# Patient Record
Sex: FEMALE | Race: WHITE | Employment: UNEMPLOYED | URBAN - METROPOLITAN AREA
[De-identification: names, ages, dates, MRNs, and addresses within clinical notes are randomized per-mention and may not be internally consistent; named-entity substitution may affect disease eponyms.]

---

## 2017-01-10 ENCOUNTER — OFFICE VISIT (OUTPATIENT)
Dept: FAMILY MEDICINE CLINIC | Age: 77
End: 2017-01-10

## 2017-01-10 VITALS
TEMPERATURE: 97.4 F | DIASTOLIC BLOOD PRESSURE: 84 MMHG | HEART RATE: 56 BPM | WEIGHT: 189.2 LBS | BODY MASS INDEX: 31.48 KG/M2 | OXYGEN SATURATION: 93 % | SYSTOLIC BLOOD PRESSURE: 120 MMHG

## 2017-01-10 DIAGNOSIS — E11.9 TYPE 2 DIABETES MELLITUS WITHOUT COMPLICATION, WITHOUT LONG-TERM CURRENT USE OF INSULIN (HCC): ICD-10-CM

## 2017-01-10 DIAGNOSIS — G30.1 LATE ONSET ALZHEIMER'S DISEASE WITHOUT BEHAVIORAL DISTURBANCE (HCC): ICD-10-CM

## 2017-01-10 DIAGNOSIS — M19.90 ARTHRITIS: ICD-10-CM

## 2017-01-10 DIAGNOSIS — Z12.31 SCREENING MAMMOGRAM, ENCOUNTER FOR: ICD-10-CM

## 2017-01-10 DIAGNOSIS — I49.9 IRREGULAR HEART BEAT: Primary | ICD-10-CM

## 2017-01-10 DIAGNOSIS — E55.9 VITAMIN D DEFICIENCY: ICD-10-CM

## 2017-01-10 DIAGNOSIS — F02.80 LATE ONSET ALZHEIMER'S DISEASE WITHOUT BEHAVIORAL DISTURBANCE (HCC): ICD-10-CM

## 2017-01-10 DIAGNOSIS — J30.1 SEASONAL ALLERGIC RHINITIS DUE TO POLLEN: ICD-10-CM

## 2017-01-10 PROCEDURE — 99214 OFFICE O/P EST MOD 30 MIN: CPT | Performed by: FAMILY MEDICINE

## 2017-01-10 PROCEDURE — 36415 COLL VENOUS BLD VENIPUNCTURE: CPT | Performed by: FAMILY MEDICINE

## 2017-01-10 PROCEDURE — 93000 ELECTROCARDIOGRAM COMPLETE: CPT | Performed by: FAMILY MEDICINE

## 2017-01-10 RX ORDER — TRAMADOL HYDROCHLORIDE 50 MG/1
50 TABLET ORAL NIGHTLY
Qty: 30 TABLET | Refills: 3 | Status: SHIPPED | OUTPATIENT
Start: 2017-01-10 | End: 2017-02-15 | Stop reason: SDUPTHER

## 2017-01-10 RX ORDER — MEMANTINE HYDROCHLORIDE 5 MG/1
5 TABLET ORAL 2 TIMES DAILY
Qty: 60 TABLET | Refills: 3 | Status: SHIPPED | OUTPATIENT
Start: 2017-01-10 | End: 2017-03-09

## 2017-01-10 RX ORDER — MONTELUKAST SODIUM 10 MG/1
10 TABLET ORAL DAILY
Qty: 30 TABLET | Refills: 3 | Status: SHIPPED | OUTPATIENT
Start: 2017-01-10 | End: 2017-07-18

## 2017-01-11 LAB
ALT SERPL-CCNC: 53 U/L (ref 10–40)
ANION GAP SERPL CALCULATED.3IONS-SCNC: 18 MMOL/L (ref 3–16)
BUN BLDV-MCNC: 13 MG/DL (ref 7–20)
CALCIUM SERPL-MCNC: 9.5 MG/DL (ref 8.3–10.6)
CHLORIDE BLD-SCNC: 103 MMOL/L (ref 99–110)
CHOLESTEROL, TOTAL: 136 MG/DL (ref 0–199)
CO2: 21 MMOL/L (ref 21–32)
CREAT SERPL-MCNC: 0.6 MG/DL (ref 0.6–1.2)
ESTIMATED AVERAGE GLUCOSE: 162.8 MG/DL
GFR AFRICAN AMERICAN: >60
GFR NON-AFRICAN AMERICAN: >60
GLUCOSE BLD-MCNC: 153 MG/DL (ref 70–99)
HBA1C MFR BLD: 7.3 %
HDLC SERPL-MCNC: 47 MG/DL (ref 40–60)
LDL CHOLESTEROL CALCULATED: 51 MG/DL
POTASSIUM SERPL-SCNC: 4.2 MMOL/L (ref 3.5–5.1)
SODIUM BLD-SCNC: 142 MMOL/L (ref 136–145)
TRIGL SERPL-MCNC: 191 MG/DL (ref 0–150)
TSH REFLEX: 1.15 UIU/ML (ref 0.27–4.2)
VITAMIN D 25-HYDROXY: 47.1 NG/ML
VLDLC SERPL CALC-MCNC: 38 MG/DL

## 2017-01-16 RX ORDER — RIZATRIPTAN BENZOATE 10 MG/1
TABLET ORAL
Qty: 9 TABLET | Refills: 2 | Status: SHIPPED | OUTPATIENT
Start: 2017-01-16 | End: 2017-06-05 | Stop reason: SDUPTHER

## 2017-01-23 ENCOUNTER — TELEPHONE (OUTPATIENT)
Dept: FAMILY MEDICINE CLINIC | Age: 77
End: 2017-01-23

## 2017-02-15 ENCOUNTER — OFFICE VISIT (OUTPATIENT)
Dept: FAMILY MEDICINE CLINIC | Age: 77
End: 2017-02-15

## 2017-02-15 VITALS
HEART RATE: 67 BPM | SYSTOLIC BLOOD PRESSURE: 110 MMHG | OXYGEN SATURATION: 95 % | WEIGHT: 187.6 LBS | DIASTOLIC BLOOD PRESSURE: 56 MMHG | TEMPERATURE: 97.5 F | BODY MASS INDEX: 31.22 KG/M2

## 2017-02-15 DIAGNOSIS — G43.709 CHRONIC MIGRAINE WITHOUT AURA WITHOUT STATUS MIGRAINOSUS, NOT INTRACTABLE: Primary | ICD-10-CM

## 2017-02-15 DIAGNOSIS — M19.90 ARTHRITIS: ICD-10-CM

## 2017-02-15 DIAGNOSIS — J30.1 SEASONAL ALLERGIC RHINITIS DUE TO POLLEN: ICD-10-CM

## 2017-02-15 DIAGNOSIS — J01.00 ACUTE NON-RECURRENT MAXILLARY SINUSITIS: ICD-10-CM

## 2017-02-15 PROCEDURE — G8399 PT W/DXA RESULTS DOCUMENT: HCPCS | Performed by: FAMILY MEDICINE

## 2017-02-15 PROCEDURE — 1090F PRES/ABSN URINE INCON ASSESS: CPT | Performed by: FAMILY MEDICINE

## 2017-02-15 PROCEDURE — 4040F PNEUMOC VAC/ADMIN/RCVD: CPT | Performed by: FAMILY MEDICINE

## 2017-02-15 PROCEDURE — 1123F ACP DISCUSS/DSCN MKR DOCD: CPT | Performed by: FAMILY MEDICINE

## 2017-02-15 PROCEDURE — 1036F TOBACCO NON-USER: CPT | Performed by: FAMILY MEDICINE

## 2017-02-15 PROCEDURE — G8417 CALC BMI ABV UP PARAM F/U: HCPCS | Performed by: FAMILY MEDICINE

## 2017-02-15 PROCEDURE — G8484 FLU IMMUNIZE NO ADMIN: HCPCS | Performed by: FAMILY MEDICINE

## 2017-02-15 PROCEDURE — G8428 CUR MEDS NOT DOCUMENT: HCPCS | Performed by: FAMILY MEDICINE

## 2017-02-15 PROCEDURE — G8599 NO ASA/ANTIPLAT THER USE RNG: HCPCS | Performed by: FAMILY MEDICINE

## 2017-02-15 PROCEDURE — 99214 OFFICE O/P EST MOD 30 MIN: CPT | Performed by: FAMILY MEDICINE

## 2017-02-15 RX ORDER — TRAMADOL HYDROCHLORIDE 50 MG/1
100 TABLET ORAL NIGHTLY
Qty: 60 TABLET | Refills: 3 | Status: SHIPPED | OUTPATIENT
Start: 2017-02-15 | End: 2017-03-09

## 2017-02-15 RX ORDER — DOXYCYCLINE 100 MG/1
100 CAPSULE ORAL 2 TIMES DAILY
Qty: 20 CAPSULE | Refills: 0 | Status: SHIPPED | OUTPATIENT
Start: 2017-02-15 | End: 2017-03-09

## 2017-02-15 RX ORDER — TOPIRAMATE 25 MG/1
TABLET ORAL
Qty: 120 TABLET | Refills: 3 | Status: SHIPPED | OUTPATIENT
Start: 2017-02-15 | End: 2017-07-15 | Stop reason: SDUPTHER

## 2017-02-15 RX ORDER — LEVOCETIRIZINE DIHYDROCHLORIDE 5 MG/1
5 TABLET, FILM COATED ORAL NIGHTLY
Qty: 30 TABLET | Refills: 5 | Status: ON HOLD | OUTPATIENT
Start: 2017-02-15 | End: 2017-09-07 | Stop reason: HOSPADM

## 2017-03-09 ENCOUNTER — OFFICE VISIT (OUTPATIENT)
Dept: FAMILY MEDICINE CLINIC | Age: 77
End: 2017-03-09

## 2017-03-09 VITALS
HEART RATE: 75 BPM | DIASTOLIC BLOOD PRESSURE: 60 MMHG | BODY MASS INDEX: 29.42 KG/M2 | TEMPERATURE: 97.4 F | OXYGEN SATURATION: 96 % | WEIGHT: 176.8 LBS | SYSTOLIC BLOOD PRESSURE: 100 MMHG

## 2017-03-09 DIAGNOSIS — E11.9 TYPE 2 DIABETES MELLITUS WITHOUT COMPLICATION, WITHOUT LONG-TERM CURRENT USE OF INSULIN (HCC): ICD-10-CM

## 2017-03-09 DIAGNOSIS — R53.83 FATIGUE, UNSPECIFIED TYPE: ICD-10-CM

## 2017-03-09 DIAGNOSIS — R10.84 GENERALIZED ABDOMINAL PAIN: Primary | ICD-10-CM

## 2017-03-09 DIAGNOSIS — G30.1 LATE ONSET ALZHEIMER'S DISEASE WITHOUT BEHAVIORAL DISTURBANCE (HCC): ICD-10-CM

## 2017-03-09 DIAGNOSIS — F02.80 LATE ONSET ALZHEIMER'S DISEASE WITHOUT BEHAVIORAL DISTURBANCE (HCC): ICD-10-CM

## 2017-03-09 DIAGNOSIS — R06.02 SHORTNESS OF BREATH: ICD-10-CM

## 2017-03-09 DIAGNOSIS — I10 ESSENTIAL HYPERTENSION: ICD-10-CM

## 2017-03-09 LAB
ALT SERPL-CCNC: 60 U/L (ref 10–40)
ANION GAP SERPL CALCULATED.3IONS-SCNC: 19 MMOL/L (ref 3–16)
AST SERPL-CCNC: 46 U/L (ref 15–37)
BASOPHILS ABSOLUTE: 0 K/UL (ref 0–0.2)
BASOPHILS RELATIVE PERCENT: 0.6 %
BUN BLDV-MCNC: 17 MG/DL (ref 7–20)
CALCIUM SERPL-MCNC: 9.9 MG/DL (ref 8.3–10.6)
CHLORIDE BLD-SCNC: 102 MMOL/L (ref 99–110)
CHOLESTEROL, TOTAL: 113 MG/DL (ref 0–199)
CO2: 21 MMOL/L (ref 21–32)
CREAT SERPL-MCNC: 0.8 MG/DL (ref 0.6–1.2)
EOSINOPHILS ABSOLUTE: 0.2 K/UL (ref 0–0.6)
EOSINOPHILS RELATIVE PERCENT: 3 %
GFR AFRICAN AMERICAN: >60
GFR NON-AFRICAN AMERICAN: >60
GLUCOSE BLD-MCNC: 96 MG/DL (ref 70–99)
HCT VFR BLD CALC: 45.4 % (ref 36–48)
HDLC SERPL-MCNC: 47 MG/DL (ref 40–60)
HEMOGLOBIN: 15.1 G/DL (ref 12–16)
LDL CHOLESTEROL CALCULATED: 37 MG/DL
LYMPHOCYTES ABSOLUTE: 2.3 K/UL (ref 1–5.1)
LYMPHOCYTES RELATIVE PERCENT: 36.8 %
MCH RBC QN AUTO: 30.3 PG (ref 26–34)
MCHC RBC AUTO-ENTMCNC: 33.2 G/DL (ref 31–36)
MCV RBC AUTO: 91.2 FL (ref 80–100)
MONOCYTES ABSOLUTE: 0.5 K/UL (ref 0–1.3)
MONOCYTES RELATIVE PERCENT: 7.1 %
NEUTROPHILS ABSOLUTE: 3.3 K/UL (ref 1.7–7.7)
NEUTROPHILS RELATIVE PERCENT: 52.5 %
PDW BLD-RTO: 14.8 % (ref 12.4–15.4)
PLATELET # BLD: 198 K/UL (ref 135–450)
PMV BLD AUTO: 9.2 FL (ref 5–10.5)
POTASSIUM SERPL-SCNC: 4 MMOL/L (ref 3.5–5.1)
RBC # BLD: 4.98 M/UL (ref 4–5.2)
SODIUM BLD-SCNC: 142 MMOL/L (ref 136–145)
TRIGL SERPL-MCNC: 146 MG/DL (ref 0–150)
TSH REFLEX: 0.73 UIU/ML (ref 0.27–4.2)
VLDLC SERPL CALC-MCNC: 29 MG/DL
WBC # BLD: 6.4 K/UL (ref 4–11)

## 2017-03-09 PROCEDURE — G8399 PT W/DXA RESULTS DOCUMENT: HCPCS | Performed by: FAMILY MEDICINE

## 2017-03-09 PROCEDURE — 1036F TOBACCO NON-USER: CPT | Performed by: FAMILY MEDICINE

## 2017-03-09 PROCEDURE — G8599 NO ASA/ANTIPLAT THER USE RNG: HCPCS | Performed by: FAMILY MEDICINE

## 2017-03-09 PROCEDURE — 1123F ACP DISCUSS/DSCN MKR DOCD: CPT | Performed by: FAMILY MEDICINE

## 2017-03-09 PROCEDURE — 36415 COLL VENOUS BLD VENIPUNCTURE: CPT | Performed by: FAMILY MEDICINE

## 2017-03-09 PROCEDURE — 99214 OFFICE O/P EST MOD 30 MIN: CPT | Performed by: FAMILY MEDICINE

## 2017-03-09 PROCEDURE — G8427 DOCREV CUR MEDS BY ELIG CLIN: HCPCS | Performed by: FAMILY MEDICINE

## 2017-03-09 PROCEDURE — 1090F PRES/ABSN URINE INCON ASSESS: CPT | Performed by: FAMILY MEDICINE

## 2017-03-09 PROCEDURE — G8420 CALC BMI NORM PARAMETERS: HCPCS | Performed by: FAMILY MEDICINE

## 2017-03-09 PROCEDURE — G8484 FLU IMMUNIZE NO ADMIN: HCPCS | Performed by: FAMILY MEDICINE

## 2017-03-09 PROCEDURE — 4040F PNEUMOC VAC/ADMIN/RCVD: CPT | Performed by: FAMILY MEDICINE

## 2017-03-09 RX ORDER — LISINOPRIL 20 MG/1
10 TABLET ORAL DAILY
Qty: 30 TABLET | Refills: 5 | Status: ON HOLD
Start: 2017-03-09 | End: 2017-04-20 | Stop reason: HOSPADM

## 2017-03-10 ENCOUNTER — HOSPITAL ENCOUNTER (OUTPATIENT)
Dept: OTHER | Age: 77
Discharge: HOME | End: 2017-03-10
Attending: FAMILY MEDICINE | Admitting: FAMILY MEDICINE

## 2017-03-10 DIAGNOSIS — R79.89 ELEVATED LFTS: Primary | ICD-10-CM

## 2017-03-10 DIAGNOSIS — R74.8 ELEVATED LIVER ENZYMES: Primary | ICD-10-CM

## 2017-03-10 DIAGNOSIS — R10.84 GENERALIZED ABDOMINAL PAIN: ICD-10-CM

## 2017-03-10 DIAGNOSIS — R06.02 SHORTNESS OF BREATH: ICD-10-CM

## 2017-03-10 LAB
ESTIMATED AVERAGE GLUCOSE: 148.5 MG/DL
HBA1C MFR BLD: 6.8 %

## 2017-03-21 ENCOUNTER — HOSPITAL ENCOUNTER (OUTPATIENT)
Dept: ULTRASOUND IMAGING | Age: 77
Discharge: HOME | End: 2017-03-21
Attending: FAMILY MEDICINE | Admitting: FAMILY MEDICINE

## 2017-03-21 DIAGNOSIS — R79.89 OTHER SPECIFIED ABNORMAL FINDINGS OF BLOOD CHEMISTRY: ICD-10-CM

## 2017-03-21 DIAGNOSIS — R79.89 ELEVATED LFTS: ICD-10-CM

## 2017-03-21 PROBLEM — K75.81 NASH (NONALCOHOLIC STEATOHEPATITIS): Status: ACTIVE | Noted: 2017-03-21

## 2017-03-21 PROBLEM — K76.0 HEPATIC STEATOSIS: Status: RESOLVED | Noted: 2017-03-21 | Resolved: 2017-03-21

## 2017-03-21 PROBLEM — K76.0 HEPATIC STEATOSIS: Status: ACTIVE | Noted: 2017-03-21

## 2017-03-21 LAB
ALBUMIN SERPL-MCNC: 3.9 G/DL (ref 3.4–5)
ALP BLD-CCNC: 83 U/L (ref 40–129)
ALT SERPL-CCNC: 66 U/L (ref 10–40)
AST SERPL-CCNC: 53 U/L (ref 15–37)
BILIRUB SERPL-MCNC: 0.6 MG/DL (ref 0–1)
BILIRUBIN DIRECT: <0.2 MG/DL (ref 0–0.3)
BILIRUBIN, INDIRECT: ABNORMAL MG/DL (ref 0–1)
HEPATITIS B CORE IGM ANTIBODY: NORMAL
HEPATITIS B SURFACE ANTIGEN INTERPRETATION: NORMAL
IRON SATURATION: 32 % (ref 15–50)
IRON: 84 UG/DL (ref 37–145)
TOTAL CK: 38 U/L (ref 26–192)
TOTAL IRON BINDING CAPACITY: 262 UG/DL (ref 260–445)

## 2017-03-22 LAB
ANA INTERPRETATION: NORMAL
ANTI-NUCLEAR ANTIBODY (ANA): NEGATIVE
HEPATITIS C ANTIBODY INTERPRETATION: NORMAL
IGA: 150 MG/DL (ref 70–400)
IGG: 710 MG/DL (ref 700–1600)
IGM: 214 MG/DL (ref 40–230)
SPE/IFE INTERPRETATION: NORMAL

## 2017-03-23 LAB
ALBUMIN SERPL-MCNC: 3.4 G/DL (ref 3.1–4.9)
ALPHA-1-GLOBULIN: 0.3 G/DL (ref 0.2–0.4)
ALPHA-2-GLOBULIN: 1 G/DL (ref 0.4–1.1)
BETA GLOBULIN: 0.9 G/DL (ref 0.9–1.6)
GAMMA GLOBULIN: 0.9 G/DL (ref 0.6–1.8)
HEPATITIS B CORE TOTAL ANTIBODY: NEGATIVE
M SPIKE 1 SERUM PROTEIN ELEC: 0.3 G/DL
TOTAL PROTEIN: 6.6 G/DL (ref 6.4–8.2)

## 2017-03-30 DIAGNOSIS — R74.8 ELEVATED LIVER ENZYMES: Primary | ICD-10-CM

## 2017-04-04 ENCOUNTER — TELEPHONE (OUTPATIENT)
Dept: FAMILY MEDICINE CLINIC | Age: 77
End: 2017-04-04

## 2017-04-05 ENCOUNTER — HOSPITAL ENCOUNTER (OUTPATIENT)
Dept: OTHER | Age: 77
Discharge: HOME | End: 2017-04-05
Attending: INTERNAL MEDICINE | Admitting: INTERNAL MEDICINE

## 2017-04-06 LAB — AFP-TUMOR MARKER: 2 NG/ML (ref 0–9)

## 2017-04-11 ENCOUNTER — HOSPITAL ENCOUNTER (OUTPATIENT)
Dept: CT IMAGING | Facility: MEDICAL CENTER | Age: 77
Discharge: HOME | End: 2017-04-11
Attending: INTERNAL MEDICINE | Admitting: INTERNAL MEDICINE

## 2017-04-11 DIAGNOSIS — R63.4 ABNORMAL WEIGHT LOSS: ICD-10-CM

## 2017-04-11 DIAGNOSIS — R63.4 LOSS OF WEIGHT: ICD-10-CM

## 2017-04-19 ENCOUNTER — HOSPITAL ENCOUNTER (OUTPATIENT)
Dept: OTHER | Age: 77
Discharge: HOME | End: 2017-04-19
Attending: INTERNAL MEDICINE | Admitting: INTERNAL MEDICINE

## 2017-04-19 VITALS
SYSTOLIC BLOOD PRESSURE: 78 MMHG | DIASTOLIC BLOOD PRESSURE: 52 MMHG | HEART RATE: 65 BPM | TEMPERATURE: 97.4 F | RESPIRATION RATE: 16 BRPM | OXYGEN SATURATION: 98 % | HEIGHT: 68 IN

## 2017-04-19 PROBLEM — E16.2 HYPOGLYCEMIA: Status: ACTIVE | Noted: 2017-04-19

## 2017-04-19 PROBLEM — K86.89 PANCREATIC MASS: Status: ACTIVE | Noted: 2017-04-19

## 2017-04-19 PROBLEM — I95.9 HYPOTENSION: Status: ACTIVE | Noted: 2017-04-19

## 2017-04-19 PROBLEM — F03.90 DEMENTIA (HCC): Status: ACTIVE | Noted: 2017-04-19

## 2017-04-19 RX ORDER — SODIUM CHLORIDE, SODIUM LACTATE, POTASSIUM CHLORIDE, CALCIUM CHLORIDE 600; 310; 30; 20 MG/100ML; MG/100ML; MG/100ML; MG/100ML
INJECTION, SOLUTION INTRAVENOUS CONTINUOUS
Status: DISCONTINUED | OUTPATIENT
Start: 2017-04-19 | End: 2017-04-20 | Stop reason: HOSPADM

## 2017-04-19 RX ORDER — SODIUM CHLORIDE, SODIUM LACTATE, POTASSIUM CHLORIDE, CALCIUM CHLORIDE 600; 310; 30; 20 MG/100ML; MG/100ML; MG/100ML; MG/100ML
INJECTION, SOLUTION INTRAVENOUS CONTINUOUS
Status: DISCONTINUED | OUTPATIENT
Start: 2017-04-19 | End: 2017-04-19 | Stop reason: DRUGHIGH

## 2017-04-19 RX ORDER — LIDOCAINE HYDROCHLORIDE 10 MG/ML
0.1 INJECTION, SOLUTION INFILTRATION; PERINEURAL
Status: ACTIVE | OUTPATIENT
Start: 2017-04-19 | End: 2017-04-19

## 2017-04-19 RX ADMIN — SODIUM CHLORIDE, SODIUM LACTATE, POTASSIUM CHLORIDE, CALCIUM CHLORIDE: 600; 310; 30; 20 INJECTION, SOLUTION INTRAVENOUS at 11:07

## 2017-04-19 ASSESSMENT — PAIN - FUNCTIONAL ASSESSMENT: PAIN_FUNCTIONAL_ASSESSMENT: 0-10

## 2017-04-20 ENCOUNTER — EPISODE CHANGES (OUTPATIENT)
Dept: CASE MANAGEMENT | Age: 77
End: 2017-04-20

## 2017-04-21 ENCOUNTER — CARE COORDINATION (OUTPATIENT)
Dept: CASE MANAGEMENT | Age: 77
End: 2017-04-21

## 2017-04-23 ENCOUNTER — CARE COORDINATION (OUTPATIENT)
Dept: CASE MANAGEMENT | Age: 77
End: 2017-04-23

## 2017-04-24 ENCOUNTER — TELEPHONE (OUTPATIENT)
Dept: FAMILY MEDICINE CLINIC | Age: 77
End: 2017-04-24

## 2017-04-24 DIAGNOSIS — R79.9 ABNORMAL BLOOD CHEMISTRY TEST: Primary | ICD-10-CM

## 2017-04-26 ENCOUNTER — TELEPHONE (OUTPATIENT)
Dept: FAMILY MEDICINE CLINIC | Age: 77
End: 2017-04-26

## 2017-04-26 ENCOUNTER — OFFICE VISIT (OUTPATIENT)
Dept: FAMILY MEDICINE CLINIC | Age: 77
End: 2017-04-26

## 2017-04-26 VITALS
SYSTOLIC BLOOD PRESSURE: 82 MMHG | HEART RATE: 59 BPM | DIASTOLIC BLOOD PRESSURE: 64 MMHG | BODY MASS INDEX: 25.42 KG/M2 | TEMPERATURE: 97.9 F | OXYGEN SATURATION: 95 % | WEIGHT: 167.2 LBS

## 2017-04-26 DIAGNOSIS — I95.9 HYPOTENSION, UNSPECIFIED HYPOTENSION TYPE: Primary | ICD-10-CM

## 2017-04-26 DIAGNOSIS — M51.9 LUMBAR DISC DISEASE: ICD-10-CM

## 2017-04-26 DIAGNOSIS — F02.80 LATE ONSET ALZHEIMER'S DISEASE WITHOUT BEHAVIORAL DISTURBANCE (HCC): ICD-10-CM

## 2017-04-26 DIAGNOSIS — G30.1 LATE ONSET ALZHEIMER'S DISEASE WITHOUT BEHAVIORAL DISTURBANCE (HCC): ICD-10-CM

## 2017-04-26 DIAGNOSIS — K59.00 CONSTIPATION, UNSPECIFIED CONSTIPATION TYPE: ICD-10-CM

## 2017-04-26 DIAGNOSIS — E16.2 HYPOGLYCEMIA: ICD-10-CM

## 2017-04-26 DIAGNOSIS — K86.89 PANCREATIC MASS: ICD-10-CM

## 2017-04-26 PROCEDURE — G8598 ASA/ANTIPLAT THER USED: HCPCS | Performed by: FAMILY MEDICINE

## 2017-04-26 PROCEDURE — G8420 CALC BMI NORM PARAMETERS: HCPCS | Performed by: FAMILY MEDICINE

## 2017-04-26 PROCEDURE — 1111F DSCHRG MED/CURRENT MED MERGE: CPT | Performed by: FAMILY MEDICINE

## 2017-04-26 PROCEDURE — 4040F PNEUMOC VAC/ADMIN/RCVD: CPT | Performed by: FAMILY MEDICINE

## 2017-04-26 PROCEDURE — 99214 OFFICE O/P EST MOD 30 MIN: CPT | Performed by: FAMILY MEDICINE

## 2017-04-26 PROCEDURE — 1036F TOBACCO NON-USER: CPT | Performed by: FAMILY MEDICINE

## 2017-04-26 PROCEDURE — 1123F ACP DISCUSS/DSCN MKR DOCD: CPT | Performed by: FAMILY MEDICINE

## 2017-04-26 PROCEDURE — G8399 PT W/DXA RESULTS DOCUMENT: HCPCS | Performed by: FAMILY MEDICINE

## 2017-04-26 PROCEDURE — G8427 DOCREV CUR MEDS BY ELIG CLIN: HCPCS | Performed by: FAMILY MEDICINE

## 2017-04-26 PROCEDURE — 1090F PRES/ABSN URINE INCON ASSESS: CPT | Performed by: FAMILY MEDICINE

## 2017-04-26 RX ORDER — GABAPENTIN 100 MG/1
100 CAPSULE ORAL 3 TIMES DAILY
Qty: 90 CAPSULE | Refills: 3 | Status: SHIPPED | OUTPATIENT
Start: 2017-04-26 | End: 2017-09-26 | Stop reason: SINTOL

## 2017-04-28 ENCOUNTER — HOSPITAL ENCOUNTER (OUTPATIENT)
Dept: OTHER | Age: 77
Discharge: HOME | End: 2017-04-28
Attending: FAMILY MEDICINE | Admitting: FAMILY MEDICINE

## 2017-04-28 LAB
ALBUMIN SERPL-MCNC: 3.3 G/DL (ref 3.4–5)
ANION GAP SERPL CALCULATED.3IONS-SCNC: 14 MMOL/L (ref 3–16)
BUN BLDV-MCNC: 12 MG/DL (ref 7–20)
CALCIUM SERPL-MCNC: 9.4 MG/DL (ref 8.3–10.6)
CHLORIDE BLD-SCNC: 109 MMOL/L (ref 99–110)
CO2: 22 MMOL/L (ref 21–32)
CREAT SERPL-MCNC: 0.6 MG/DL (ref 0.6–1.2)
GFR AFRICAN AMERICAN: >60
GFR NON-AFRICAN AMERICAN: >60
GLUCOSE BLD-MCNC: 147 MG/DL (ref 70–99)
PHOSPHORUS: 3.2 MG/DL (ref 2.5–4.9)
POTASSIUM SERPL-SCNC: 3.9 MMOL/L (ref 3.5–5.1)
SODIUM BLD-SCNC: 145 MMOL/L (ref 136–145)

## 2017-05-05 RX ORDER — DONEPEZIL HYDROCHLORIDE 10 MG/1
TABLET, FILM COATED ORAL
Qty: 30 TABLET | Refills: 5 | Status: SHIPPED | OUTPATIENT
Start: 2017-05-05 | End: 2017-12-02 | Stop reason: SDUPTHER

## 2017-05-09 ENCOUNTER — TELEPHONE (OUTPATIENT)
Dept: FAMILY MEDICINE CLINIC | Age: 77
End: 2017-05-09

## 2017-05-10 ENCOUNTER — TELEPHONE (OUTPATIENT)
Dept: FAMILY MEDICINE CLINIC | Age: 77
End: 2017-05-10

## 2017-05-10 ENCOUNTER — OFFICE VISIT (OUTPATIENT)
Dept: FAMILY MEDICINE CLINIC | Age: 77
End: 2017-05-10

## 2017-05-10 VITALS
WEIGHT: 164.2 LBS | BODY MASS INDEX: 24.97 KG/M2 | TEMPERATURE: 97.4 F | DIASTOLIC BLOOD PRESSURE: 52 MMHG | HEART RATE: 52 BPM | SYSTOLIC BLOOD PRESSURE: 100 MMHG | OXYGEN SATURATION: 97 %

## 2017-05-10 DIAGNOSIS — E55.9 VITAMIN D DEFICIENCY: ICD-10-CM

## 2017-05-10 DIAGNOSIS — F02.80 LATE ONSET ALZHEIMER'S DISEASE WITHOUT BEHAVIORAL DISTURBANCE (HCC): ICD-10-CM

## 2017-05-10 DIAGNOSIS — M51.9 LUMBAR DISC DISEASE: ICD-10-CM

## 2017-05-10 DIAGNOSIS — G30.1 LATE ONSET ALZHEIMER'S DISEASE WITHOUT BEHAVIORAL DISTURBANCE (HCC): ICD-10-CM

## 2017-05-10 DIAGNOSIS — E78.00 HYPERCHOLESTEREMIA: Primary | ICD-10-CM

## 2017-05-10 DIAGNOSIS — I10 ESSENTIAL HYPERTENSION: ICD-10-CM

## 2017-05-10 DIAGNOSIS — B35.1 ONYCHOMYCOSIS: ICD-10-CM

## 2017-05-10 DIAGNOSIS — K58.2 IRRITABLE BOWEL SYNDROME WITH BOTH CONSTIPATION AND DIARRHEA: ICD-10-CM

## 2017-05-10 DIAGNOSIS — E53.8 B12 DEFICIENCY: ICD-10-CM

## 2017-05-10 DIAGNOSIS — E16.2 HYPOGLYCEMIA: ICD-10-CM

## 2017-05-10 PROCEDURE — 4040F PNEUMOC VAC/ADMIN/RCVD: CPT | Performed by: FAMILY MEDICINE

## 2017-05-10 PROCEDURE — 1090F PRES/ABSN URINE INCON ASSESS: CPT | Performed by: FAMILY MEDICINE

## 2017-05-10 PROCEDURE — 1036F TOBACCO NON-USER: CPT | Performed by: FAMILY MEDICINE

## 2017-05-10 PROCEDURE — G8420 CALC BMI NORM PARAMETERS: HCPCS | Performed by: FAMILY MEDICINE

## 2017-05-10 PROCEDURE — G8399 PT W/DXA RESULTS DOCUMENT: HCPCS | Performed by: FAMILY MEDICINE

## 2017-05-10 PROCEDURE — 1111F DSCHRG MED/CURRENT MED MERGE: CPT | Performed by: FAMILY MEDICINE

## 2017-05-10 PROCEDURE — 1123F ACP DISCUSS/DSCN MKR DOCD: CPT | Performed by: FAMILY MEDICINE

## 2017-05-10 PROCEDURE — 36415 COLL VENOUS BLD VENIPUNCTURE: CPT | Performed by: FAMILY MEDICINE

## 2017-05-10 PROCEDURE — G8427 DOCREV CUR MEDS BY ELIG CLIN: HCPCS | Performed by: FAMILY MEDICINE

## 2017-05-10 PROCEDURE — 99214 OFFICE O/P EST MOD 30 MIN: CPT | Performed by: FAMILY MEDICINE

## 2017-05-10 PROCEDURE — G8598 ASA/ANTIPLAT THER USED: HCPCS | Performed by: FAMILY MEDICINE

## 2017-05-10 RX ORDER — DICYCLOMINE HYDROCHLORIDE 10 MG/1
10 CAPSULE ORAL 3 TIMES DAILY PRN
Qty: 120 CAPSULE | Refills: 3 | Status: SHIPPED | OUTPATIENT
Start: 2017-05-10 | End: 2017-12-02 | Stop reason: SDUPTHER

## 2017-05-10 RX ORDER — TERBINAFINE HYDROCHLORIDE 250 MG/1
250 TABLET ORAL DAILY
Qty: 28 TABLET | Refills: 2 | Status: ON HOLD | OUTPATIENT
Start: 2017-05-10 | End: 2017-09-07 | Stop reason: HOSPADM

## 2017-05-11 LAB
ALT SERPL-CCNC: 42 U/L (ref 10–40)
ANION GAP SERPL CALCULATED.3IONS-SCNC: 14 MMOL/L (ref 3–16)
BUN BLDV-MCNC: 15 MG/DL (ref 7–20)
CALCIUM SERPL-MCNC: 9.3 MG/DL (ref 8.3–10.6)
CHLORIDE BLD-SCNC: 109 MMOL/L (ref 99–110)
CHOLESTEROL, TOTAL: 119 MG/DL (ref 0–199)
CO2: 23 MMOL/L (ref 21–32)
CREAT SERPL-MCNC: 0.6 MG/DL (ref 0.6–1.2)
ESTIMATED AVERAGE GLUCOSE: 125.5 MG/DL
GFR AFRICAN AMERICAN: >60
GFR NON-AFRICAN AMERICAN: >60
GLUCOSE BLD-MCNC: 126 MG/DL (ref 70–99)
HBA1C MFR BLD: 6 %
HDLC SERPL-MCNC: 46 MG/DL (ref 40–60)
LDL CHOLESTEROL CALCULATED: 34 MG/DL
POTASSIUM SERPL-SCNC: 4.3 MMOL/L (ref 3.5–5.1)
SODIUM BLD-SCNC: 146 MMOL/L (ref 136–145)
TRIGL SERPL-MCNC: 196 MG/DL (ref 0–150)
TSH REFLEX: 0.85 UIU/ML (ref 0.27–4.2)
VITAMIN B-12: 716 PG/ML (ref 211–911)
VITAMIN D 25-HYDROXY: 48.9 NG/ML
VLDLC SERPL CALC-MCNC: 39 MG/DL

## 2017-05-23 ENCOUNTER — HOSPITAL ENCOUNTER (OUTPATIENT)
Dept: MAMMOGRAPHY | Age: 77
Discharge: HOME | End: 2017-05-23
Attending: FAMILY MEDICINE | Admitting: FAMILY MEDICINE

## 2017-05-23 DIAGNOSIS — Z12.31 ENCOUNTER FOR SCREENING MAMMOGRAM FOR BREAST CANCER: ICD-10-CM

## 2017-06-06 RX ORDER — RIZATRIPTAN BENZOATE 10 MG/1
TABLET ORAL
Qty: 9 TABLET | Refills: 2 | Status: SHIPPED | OUTPATIENT
Start: 2017-06-06 | End: 2017-12-02 | Stop reason: SDUPTHER

## 2017-06-08 ENCOUNTER — TELEPHONE (OUTPATIENT)
Dept: FAMILY MEDICINE CLINIC | Age: 77
End: 2017-06-08

## 2017-06-08 DIAGNOSIS — G30.1 LATE ONSET ALZHEIMER'S DISEASE WITHOUT BEHAVIORAL DISTURBANCE (HCC): Primary | ICD-10-CM

## 2017-06-08 DIAGNOSIS — M51.9 LUMBAR DISC DISEASE: ICD-10-CM

## 2017-06-08 DIAGNOSIS — F02.80 LATE ONSET ALZHEIMER'S DISEASE WITHOUT BEHAVIORAL DISTURBANCE (HCC): Primary | ICD-10-CM

## 2017-06-08 DIAGNOSIS — R53.1 WEAKNESS: ICD-10-CM

## 2017-06-09 RX ORDER — LACTOSE-REDUCED FOOD/FIBER
LIQUID (ML) ORAL
Qty: 60 CAN | Refills: 5 | Status: SHIPPED | OUTPATIENT
Start: 2017-06-09 | End: 2017-06-23 | Stop reason: SDUPTHER

## 2017-06-13 ENCOUNTER — TELEPHONE (OUTPATIENT)
Dept: FAMILY MEDICINE CLINIC | Age: 77
End: 2017-06-13

## 2017-06-13 RX ORDER — MEMANTINE HYDROCHLORIDE 5 MG/1
5 TABLET ORAL 2 TIMES DAILY
Qty: 60 TABLET | Refills: 3 | Status: SHIPPED | OUTPATIENT
Start: 2017-06-13 | End: 2018-03-03 | Stop reason: SDUPTHER

## 2017-06-15 ENCOUNTER — CARE COORDINATION (OUTPATIENT)
Dept: CARE COORDINATION | Age: 77
End: 2017-06-15

## 2017-06-23 ENCOUNTER — CARE COORDINATION (OUTPATIENT)
Dept: CARE COORDINATION | Age: 77
End: 2017-06-23

## 2017-06-23 RX ORDER — LACTOSE-REDUCED FOOD/FIBER
LIQUID (ML) ORAL
Qty: 60 CAN | Refills: 5 | Status: ON HOLD | OUTPATIENT
Start: 2017-06-23 | End: 2017-09-07 | Stop reason: HOSPADM

## 2017-07-14 ENCOUNTER — CARE COORDINATION (OUTPATIENT)
Dept: CARE COORDINATION | Age: 77
End: 2017-07-14

## 2017-07-17 ENCOUNTER — CARE COORDINATION (OUTPATIENT)
Dept: CARE COORDINATION | Age: 77
End: 2017-07-17

## 2017-07-17 RX ORDER — TOPIRAMATE 25 MG/1
TABLET ORAL
Qty: 120 TABLET | Refills: 5 | Status: SHIPPED | OUTPATIENT
Start: 2017-07-17 | End: 2018-02-04 | Stop reason: SDUPTHER

## 2017-07-18 ENCOUNTER — OFFICE VISIT (OUTPATIENT)
Dept: FAMILY MEDICINE CLINIC | Age: 77
End: 2017-07-18

## 2017-07-18 VITALS
SYSTOLIC BLOOD PRESSURE: 100 MMHG | OXYGEN SATURATION: 97 % | TEMPERATURE: 97 F | DIASTOLIC BLOOD PRESSURE: 50 MMHG | BODY MASS INDEX: 25.33 KG/M2 | WEIGHT: 166.6 LBS | HEART RATE: 56 BPM

## 2017-07-18 DIAGNOSIS — E78.00 HYPERCHOLESTEREMIA: ICD-10-CM

## 2017-07-18 DIAGNOSIS — G30.1 LATE ONSET ALZHEIMER'S DISEASE WITHOUT BEHAVIORAL DISTURBANCE (HCC): ICD-10-CM

## 2017-07-18 DIAGNOSIS — F02.80 LATE ONSET ALZHEIMER'S DISEASE WITHOUT BEHAVIORAL DISTURBANCE (HCC): ICD-10-CM

## 2017-07-18 DIAGNOSIS — J40 BRONCHITIS: Primary | ICD-10-CM

## 2017-07-18 PROCEDURE — 1036F TOBACCO NON-USER: CPT | Performed by: FAMILY MEDICINE

## 2017-07-18 PROCEDURE — G8417 CALC BMI ABV UP PARAM F/U: HCPCS | Performed by: FAMILY MEDICINE

## 2017-07-18 PROCEDURE — 1090F PRES/ABSN URINE INCON ASSESS: CPT | Performed by: FAMILY MEDICINE

## 2017-07-18 PROCEDURE — 1123F ACP DISCUSS/DSCN MKR DOCD: CPT | Performed by: FAMILY MEDICINE

## 2017-07-18 PROCEDURE — G8427 DOCREV CUR MEDS BY ELIG CLIN: HCPCS | Performed by: FAMILY MEDICINE

## 2017-07-18 PROCEDURE — G8598 ASA/ANTIPLAT THER USED: HCPCS | Performed by: FAMILY MEDICINE

## 2017-07-18 PROCEDURE — 4040F PNEUMOC VAC/ADMIN/RCVD: CPT | Performed by: FAMILY MEDICINE

## 2017-07-18 PROCEDURE — G8399 PT W/DXA RESULTS DOCUMENT: HCPCS | Performed by: FAMILY MEDICINE

## 2017-07-18 PROCEDURE — 99214 OFFICE O/P EST MOD 30 MIN: CPT | Performed by: FAMILY MEDICINE

## 2017-07-18 RX ORDER — DOXYCYCLINE HYCLATE 100 MG/1
100 CAPSULE ORAL 2 TIMES DAILY
Qty: 20 CAPSULE | Refills: 0 | Status: SHIPPED | OUTPATIENT
Start: 2017-07-18 | End: 2017-07-28

## 2017-07-21 ENCOUNTER — CARE COORDINATION (OUTPATIENT)
Dept: CARE COORDINATION | Age: 77
End: 2017-07-21

## 2017-07-24 ENCOUNTER — CARE COORDINATION (OUTPATIENT)
Dept: CARE COORDINATION | Age: 77
End: 2017-07-24

## 2017-07-24 ENCOUNTER — TELEPHONE (OUTPATIENT)
Dept: FAMILY MEDICINE CLINIC | Age: 77
End: 2017-07-24

## 2017-07-24 DIAGNOSIS — F02.80 LATE ONSET ALZHEIMER'S DISEASE WITHOUT BEHAVIORAL DISTURBANCE (HCC): Primary | ICD-10-CM

## 2017-07-24 DIAGNOSIS — G30.1 LATE ONSET ALZHEIMER'S DISEASE WITHOUT BEHAVIORAL DISTURBANCE (HCC): Primary | ICD-10-CM

## 2017-08-18 ENCOUNTER — OFFICE VISIT (OUTPATIENT)
Dept: FAMILY MEDICINE CLINIC | Age: 77
End: 2017-08-18

## 2017-08-18 VITALS
TEMPERATURE: 97.4 F | HEART RATE: 46 BPM | OXYGEN SATURATION: 97 % | WEIGHT: 161.4 LBS | DIASTOLIC BLOOD PRESSURE: 58 MMHG | SYSTOLIC BLOOD PRESSURE: 90 MMHG | BODY MASS INDEX: 24.54 KG/M2

## 2017-08-18 DIAGNOSIS — G30.1 LATE ONSET ALZHEIMER'S DISEASE WITHOUT BEHAVIORAL DISTURBANCE (HCC): Primary | ICD-10-CM

## 2017-08-18 DIAGNOSIS — F51.01 PRIMARY INSOMNIA: ICD-10-CM

## 2017-08-18 DIAGNOSIS — F02.80 LATE ONSET ALZHEIMER'S DISEASE WITHOUT BEHAVIORAL DISTURBANCE (HCC): Primary | ICD-10-CM

## 2017-08-18 DIAGNOSIS — J30.1 SEASONAL ALLERGIC RHINITIS DUE TO POLLEN: ICD-10-CM

## 2017-08-18 DIAGNOSIS — L60.3 ONYCHODYSTROPHY: ICD-10-CM

## 2017-08-18 PROCEDURE — G8427 DOCREV CUR MEDS BY ELIG CLIN: HCPCS | Performed by: FAMILY MEDICINE

## 2017-08-18 PROCEDURE — 4040F PNEUMOC VAC/ADMIN/RCVD: CPT | Performed by: FAMILY MEDICINE

## 2017-08-18 PROCEDURE — 1090F PRES/ABSN URINE INCON ASSESS: CPT | Performed by: FAMILY MEDICINE

## 2017-08-18 PROCEDURE — 99214 OFFICE O/P EST MOD 30 MIN: CPT | Performed by: FAMILY MEDICINE

## 2017-08-18 PROCEDURE — 1123F ACP DISCUSS/DSCN MKR DOCD: CPT | Performed by: FAMILY MEDICINE

## 2017-08-18 PROCEDURE — G8420 CALC BMI NORM PARAMETERS: HCPCS | Performed by: FAMILY MEDICINE

## 2017-08-18 PROCEDURE — G8598 ASA/ANTIPLAT THER USED: HCPCS | Performed by: FAMILY MEDICINE

## 2017-08-18 PROCEDURE — 1036F TOBACCO NON-USER: CPT | Performed by: FAMILY MEDICINE

## 2017-08-18 PROCEDURE — G8399 PT W/DXA RESULTS DOCUMENT: HCPCS | Performed by: FAMILY MEDICINE

## 2017-08-18 RX ORDER — TRAZODONE HYDROCHLORIDE 50 MG/1
50-100 TABLET ORAL NIGHTLY
Qty: 60 TABLET | Refills: 1 | Status: SHIPPED | OUTPATIENT
Start: 2017-08-18 | End: 2018-02-04 | Stop reason: SDUPTHER

## 2017-08-18 RX ORDER — MONTELUKAST SODIUM 10 MG/1
10 TABLET ORAL DAILY
Qty: 30 TABLET | Refills: 3 | Status: SHIPPED | OUTPATIENT
Start: 2017-08-18 | End: 2018-05-14 | Stop reason: SDUPTHER

## 2017-08-18 ASSESSMENT — PATIENT HEALTH QUESTIONNAIRE - PHQ9
1. LITTLE INTEREST OR PLEASURE IN DOING THINGS: 0
SUM OF ALL RESPONSES TO PHQ9 QUESTIONS 1 & 2: 0
SUM OF ALL RESPONSES TO PHQ QUESTIONS 1-9: 0
2. FEELING DOWN, DEPRESSED OR HOPELESS: 0

## 2017-08-21 RX ORDER — MULTIVIT-MIN/IRON FUM/FOLIC AC 7.5 MG-4
1 TABLET ORAL DAILY
Qty: 30 TABLET | Refills: 11 | Status: SHIPPED | OUTPATIENT
Start: 2017-08-21 | End: 2020-01-07

## 2017-09-05 PROBLEM — R00.1 BRADYCARDIA: Status: ACTIVE | Noted: 2017-09-05

## 2017-09-05 PROBLEM — R55 SYNCOPE: Status: ACTIVE | Noted: 2017-09-05

## 2017-09-08 ENCOUNTER — CARE COORDINATION (OUTPATIENT)
Dept: CASE MANAGEMENT | Age: 77
End: 2017-09-08

## 2017-09-08 ENCOUNTER — TELEPHONE (OUTPATIENT)
Dept: PHARMACY | Facility: CLINIC | Age: 77
End: 2017-09-08

## 2017-09-11 ENCOUNTER — CARE COORDINATION (OUTPATIENT)
Dept: CARE COORDINATION | Age: 77
End: 2017-09-11

## 2017-09-12 ENCOUNTER — CARE COORDINATION (OUTPATIENT)
Dept: CASE MANAGEMENT | Age: 77
End: 2017-09-12

## 2017-09-12 DIAGNOSIS — G30.1 LATE ONSET ALZHEIMER'S DISEASE WITHOUT BEHAVIORAL DISTURBANCE (HCC): Primary | ICD-10-CM

## 2017-09-12 DIAGNOSIS — F02.80 LATE ONSET ALZHEIMER'S DISEASE WITHOUT BEHAVIORAL DISTURBANCE (HCC): Primary | ICD-10-CM

## 2017-09-14 ENCOUNTER — TELEPHONE (OUTPATIENT)
Dept: FAMILY MEDICINE CLINIC | Age: 77
End: 2017-09-14

## 2017-09-15 ENCOUNTER — OFFICE VISIT (OUTPATIENT)
Dept: FAMILY MEDICINE CLINIC | Age: 77
End: 2017-09-15

## 2017-09-15 VITALS
SYSTOLIC BLOOD PRESSURE: 120 MMHG | WEIGHT: 161.8 LBS | DIASTOLIC BLOOD PRESSURE: 70 MMHG | OXYGEN SATURATION: 98 % | HEART RATE: 82 BPM | BODY MASS INDEX: 24.6 KG/M2

## 2017-09-15 DIAGNOSIS — R32 URINARY INCONTINENCE, UNSPECIFIED TYPE: Primary | ICD-10-CM

## 2017-09-15 DIAGNOSIS — M51.9 LUMBAR DISC DISEASE: ICD-10-CM

## 2017-09-15 DIAGNOSIS — F03.90 DEMENTIA WITHOUT BEHAVIORAL DISTURBANCE, UNSPECIFIED DEMENTIA TYPE: ICD-10-CM

## 2017-09-15 DIAGNOSIS — M19.90 ARTHRITIS: ICD-10-CM

## 2017-09-15 DIAGNOSIS — G89.29 CHRONIC RIGHT SHOULDER PAIN: ICD-10-CM

## 2017-09-15 DIAGNOSIS — R55 SYNCOPE, UNSPECIFIED SYNCOPE TYPE: ICD-10-CM

## 2017-09-15 DIAGNOSIS — M25.511 CHRONIC RIGHT SHOULDER PAIN: ICD-10-CM

## 2017-09-15 PROCEDURE — 1123F ACP DISCUSS/DSCN MKR DOCD: CPT | Performed by: FAMILY MEDICINE

## 2017-09-15 PROCEDURE — G8420 CALC BMI NORM PARAMETERS: HCPCS | Performed by: FAMILY MEDICINE

## 2017-09-15 PROCEDURE — 1036F TOBACCO NON-USER: CPT | Performed by: FAMILY MEDICINE

## 2017-09-15 PROCEDURE — 0509F URINE INCON PLAN DOCD: CPT | Performed by: FAMILY MEDICINE

## 2017-09-15 PROCEDURE — 99214 OFFICE O/P EST MOD 30 MIN: CPT | Performed by: FAMILY MEDICINE

## 2017-09-15 PROCEDURE — 1111F DSCHRG MED/CURRENT MED MERGE: CPT | Performed by: FAMILY MEDICINE

## 2017-09-15 PROCEDURE — G8598 ASA/ANTIPLAT THER USED: HCPCS | Performed by: FAMILY MEDICINE

## 2017-09-15 PROCEDURE — G8427 DOCREV CUR MEDS BY ELIG CLIN: HCPCS | Performed by: FAMILY MEDICINE

## 2017-09-15 PROCEDURE — 1090F PRES/ABSN URINE INCON ASSESS: CPT | Performed by: FAMILY MEDICINE

## 2017-09-15 PROCEDURE — 4040F PNEUMOC VAC/ADMIN/RCVD: CPT | Performed by: FAMILY MEDICINE

## 2017-09-15 PROCEDURE — G8399 PT W/DXA RESULTS DOCUMENT: HCPCS | Performed by: FAMILY MEDICINE

## 2017-09-15 PROCEDURE — 81003 URINALYSIS AUTO W/O SCOPE: CPT | Performed by: FAMILY MEDICINE

## 2017-09-19 ENCOUNTER — CARE COORDINATION (OUTPATIENT)
Dept: CASE MANAGEMENT | Age: 77
End: 2017-09-19

## 2017-09-19 ENCOUNTER — CARE COORDINATION (OUTPATIENT)
Dept: CARE COORDINATION | Age: 77
End: 2017-09-19

## 2017-09-26 ENCOUNTER — NURSE ONLY (OUTPATIENT)
Dept: FAMILY MEDICINE CLINIC | Age: 77
End: 2017-09-26

## 2017-09-26 DIAGNOSIS — R35.1 FREQUENT URINATION AT NIGHT: Primary | ICD-10-CM

## 2017-09-26 LAB
BILIRUBIN, POC: ABNORMAL
BLOOD URINE, POC: ABNORMAL
CLARITY, POC: CLEAR
COLOR, POC: ABNORMAL
GLUCOSE URINE, POC: ABNORMAL
KETONES, POC: ABNORMAL
LEUKOCYTE EST, POC: ABNORMAL
NITRITE, POC: ABNORMAL
PH, POC: 7
PROTEIN, POC: ABNORMAL
SPECIFIC GRAVITY, POC: 1.01
UROBILINOGEN, POC: 0.2

## 2017-09-26 PROCEDURE — 81002 URINALYSIS NONAUTO W/O SCOPE: CPT | Performed by: FAMILY MEDICINE

## 2017-09-28 LAB — URINE CULTURE, ROUTINE: NORMAL

## 2017-09-29 ENCOUNTER — TELEPHONE (OUTPATIENT)
Dept: FAMILY MEDICINE CLINIC | Age: 77
End: 2017-09-29

## 2017-10-02 RX ORDER — HYOSCYAMINE SULFATE 0.125 MG
125 TABLET ORAL 2 TIMES DAILY PRN
Qty: 60 TABLET | Refills: 3 | Status: SHIPPED | OUTPATIENT
Start: 2017-10-02 | End: 2018-03-01

## 2017-10-02 NOTE — TELEPHONE ENCOUNTER
Shilpa AUGUSTINEO called and patient has refills on her Bentyl but this medication is on back order, is there something else you can change her to?

## 2017-11-14 ENCOUNTER — CARE COORDINATION (OUTPATIENT)
Dept: FAMILY MEDICINE CLINIC | Age: 77
End: 2017-11-14

## 2017-11-14 NOTE — PATIENT INSTRUCTIONS
Patient Education        Managing Your Allergies: Care Instructions  Your Care Instructions  Managing your allergies is an important part of staying healthy. Your doctor will help you find out what may be causing the allergies. Common causes of allergy symptoms are house dust and dust mites, animal dander, mold, and pollen. As soon as you know what triggers your symptoms, try to reduce your exposure to your triggers. This can help prevent allergy symptoms, asthma, and other health problems. Ask your doctor about allergy medicine or immunotherapy. These treatments may help reduce or prevent allergy symptoms. Follow-up care is a key part of your treatment and safety. Be sure to make and go to all appointments, and call your doctor if you are having problems. It's also a good idea to know your test results and keep a list of the medicines you take. How can you care for yourself at home? · If you think that dust or dust mites are causing your allergies:  ¨ Wash sheets, pillowcases, and other bedding every week in hot water. ¨ Use airtight, dust-proof covers for pillows, duvets, and mattresses. Avoid plastic covers, because they tend to tear quickly and do not \"breathe. \" Wash according to the instructions. ¨ Remove extra blankets and pillows that you don't need. ¨ Use blankets that are machine-washable. ¨ Don't use home humidifiers. They can help mites live longer. · Use air-conditioning. Change or clean all filters every month. Keep windows closed. Use high-efficiency air filters. Don't use window or attic fans, which draw dust into the air. · If you're allergic to pet dander, keep pets outside or, at the very least, out of your bedroom. Old carpet and cloth-covered furniture can hold a lot of animal dander. You may need to replace them. · Look for signs of cockroaches. Use cockroach baits to get rid of them. Then clean your home well.   · If you're allergic to mold, don't keep indoor plants, because molds [employer] disclaims any warranty or liability for your use of this information.

## 2017-11-14 NOTE — CARE COORDINATION
Health Services: In Process  Life Skills Coaching: In Process (Comment: skills coaching with caregiver)  Medi Set or Pill Pack:  Completed  Physical Therapy:  Completed  Senior Services:  Completed  Specialty Services Referral:  In Process (Comment: [facility]-Jose method/Alzheimers Whisper program)  Other Services: In Process (Comment: [facility] Method/Alzheimer's [facility] Program)  Zone Management Tools: In Process         Goals Addressed             Most Recent       Care Coordination     Community Resource Goal   On track (11/14/2017)             I will complete referral to [address] for assistance. Barriers: none  Plan for overcoming my barriers: N/A  Confidence: 9/10  Anticipated Goal Completion Date: 8/7/17            Prior to Admission medications    Medication Sig Start Date End Date Taking?  Authorizing Provider   hyoscyamine (LEVSIN) 125 MCG tablet Take 1 tablet by mouth 2 times daily as needed for Cramping 10/2/17   Jeane Rizzo MD   diclofenac sodium (VOLTAREN) 1 % GEL Apply 4 g topically 4 times daily as needed for Pain 9/15/17   Jeane Rizzo MD   Multiple Vitamins-Minerals (MULTIVITAMIN WITH MINERALS) tablet Take 1 tablet by mouth daily 8/21/17   Jeane Rizzo MD   montelukast (SINGULAIR) 10 MG tablet Take 1 tablet by mouth daily 8/18/17   Jeane Rizzo MD   traZODone (DESYREL) 50 MG tablet Take 1-2 tablets by mouth nightly 8/18/17   Jeane Rizzo MD   topiramate (TOPAMAX) 25 MG tablet TAKE ONE TABLET BY MOUTH TWICE A DAY FOR 1 WEEK, THEN TAKE TWO TABLETS BY MOUTH TWICE A DAY 7/17/17   Jeane Rizzo MD   memantine [facility]) 5 MG tablet Take 1 tablet by mouth 2 times daily 6/13/17   Jeane Rizzo MD   rizatriptan (MAXALT) 10 MG tablet TAKE 1 TABLET BY MOUTH AS NEEDED FOR A MIGRAINE, MAY REPEAT THE DOSE IN 2 HOURS IF NEEDED, MAX 2 TABLETS PER 24 HOURS 6/6/17   Jeane Rizzo MD   dicyclomine (BENTYL) 10 MG capsule Take 1 capsule by mouth 3 times daily as needed (bowel symptoms) 5/10/17   Levar Patel MD   donepezil (ARICEPT) 10 MG tablet TAKE ONE TABLET BY MOUTH ONCE NIGHTLY 5/5/17   Levar Patel MD   fluticasone [facility]) 50 MCG/ACT nasal spray 1 spray by Nasal route daily    Historical Provider, MD   aspirin EC 81 MG EC tablet Take 1 tablet by mouth daily 6/13/16   Levar Patel MD   Cholecalciferol (VITAMIN D-3) 1000 UNITS CAPS Take 3,000 Units by mouth daily 6/13/16   Levar Patel MD   vitamin B-12 (CYANOCOBALAMIN) 500 MCG tablet Take 1 tablet by mouth daily 2/4/16   Levar Patel MD       No future appointments.

## 2017-12-04 RX ORDER — DICYCLOMINE HYDROCHLORIDE 10 MG/1
CAPSULE ORAL
Qty: 120 CAPSULE | Refills: 3 | Status: SHIPPED | OUTPATIENT
Start: 2017-12-04 | End: 2018-04-07 | Stop reason: SDUPTHER

## 2017-12-04 RX ORDER — DONEPEZIL HYDROCHLORIDE 10 MG/1
TABLET, FILM COATED ORAL
Qty: 30 TABLET | Refills: 5 | Status: SHIPPED | OUTPATIENT
Start: 2017-12-04 | End: 2018-06-14 | Stop reason: SDUPTHER

## 2017-12-04 RX ORDER — RIZATRIPTAN BENZOATE 10 MG/1
TABLET ORAL
Qty: 9 TABLET | Refills: 1 | Status: SHIPPED | OUTPATIENT
Start: 2017-12-04 | End: 2018-02-04 | Stop reason: SDUPTHER

## 2017-12-11 ENCOUNTER — CARE COORDINATION (OUTPATIENT)
Dept: CARE COORDINATION | Age: 77
End: 2017-12-11

## 2017-12-11 NOTE — CARE COORDINATION
Attempted to contact  patient; left message to return call  Contact information provided      Amrita Arguello RN, BSN  Nurse Care Coordinator   [phone]

## 2017-12-18 ENCOUNTER — CARE COORDINATION (OUTPATIENT)
Dept: CARE COORDINATION | Age: 77
End: 2017-12-18

## 2017-12-18 NOTE — CARE COORDINATION
Spoke with patient's [address] 89  She reports over 3 missed home visits and son not consistently returning calls  Limits time to 1230 -1500-Tried to work around this schedule  Limited in time and not able to provide the education in a consistent manner. Son watches TV during education and does not participate  [id number] on discharging in next couple of weeks.

## 2018-01-04 ENCOUNTER — TELEPHONE (OUTPATIENT)
Dept: FAMILY MEDICINE CLINIC | Age: 78
End: 2018-01-04

## 2018-01-04 RX ORDER — LEVOFLOXACIN 500 MG/1
500 TABLET, FILM COATED ORAL DAILY
Qty: 10 TABLET | Refills: 0 | Status: SHIPPED | OUTPATIENT
Start: 2018-01-04 | End: 2018-01-14

## 2018-01-04 NOTE — TELEPHONE ENCOUNTER
Nurse called from [address] Patient is having a productive cough with green drainage clear nasal discharge. She has crackles left lower lobe. Oxygen Sat 97% room air temp 98.6. Can you prescribe an antibiotic?

## 2018-01-08 ENCOUNTER — OFFICE VISIT (OUTPATIENT)
Dept: FAMILY MEDICINE CLINIC | Age: 78
End: 2018-01-08

## 2018-01-08 ENCOUNTER — CARE COORDINATOR VISIT (OUTPATIENT)
Dept: CARE COORDINATION | Age: 78
End: 2018-01-08

## 2018-01-08 VITALS
HEART RATE: 50 BPM | SYSTOLIC BLOOD PRESSURE: 84 MMHG | BODY MASS INDEX: 21.17 KG/M2 | OXYGEN SATURATION: 97 % | TEMPERATURE: 97.5 F | DIASTOLIC BLOOD PRESSURE: 60 MMHG | WEIGHT: 139.2 LBS

## 2018-01-08 DIAGNOSIS — G89.29 CHRONIC BILATERAL THORACIC BACK PAIN: ICD-10-CM

## 2018-01-08 DIAGNOSIS — J34.89 RHINORRHEA: ICD-10-CM

## 2018-01-08 DIAGNOSIS — M54.6 CHRONIC BILATERAL THORACIC BACK PAIN: ICD-10-CM

## 2018-01-08 DIAGNOSIS — R63.4 WEIGHT LOSS: Primary | ICD-10-CM

## 2018-01-08 DIAGNOSIS — G30.1 LATE ONSET ALZHEIMER'S DISEASE WITHOUT BEHAVIORAL DISTURBANCE (HCC): ICD-10-CM

## 2018-01-08 DIAGNOSIS — F02.80 LATE ONSET ALZHEIMER'S DISEASE WITHOUT BEHAVIORAL DISTURBANCE (HCC): ICD-10-CM

## 2018-01-08 DIAGNOSIS — M25.511 CHRONIC RIGHT SHOULDER PAIN: ICD-10-CM

## 2018-01-08 DIAGNOSIS — G89.29 CHRONIC RIGHT SHOULDER PAIN: ICD-10-CM

## 2018-01-08 PROCEDURE — G8427 DOCREV CUR MEDS BY ELIG CLIN: HCPCS | Performed by: FAMILY MEDICINE

## 2018-01-08 PROCEDURE — 4040F PNEUMOC VAC/ADMIN/RCVD: CPT | Performed by: FAMILY MEDICINE

## 2018-01-08 PROCEDURE — G8420 CALC BMI NORM PARAMETERS: HCPCS | Performed by: FAMILY MEDICINE

## 2018-01-08 PROCEDURE — 99214 OFFICE O/P EST MOD 30 MIN: CPT | Performed by: FAMILY MEDICINE

## 2018-01-08 PROCEDURE — 1090F PRES/ABSN URINE INCON ASSESS: CPT | Performed by: FAMILY MEDICINE

## 2018-01-08 PROCEDURE — G8484 FLU IMMUNIZE NO ADMIN: HCPCS | Performed by: FAMILY MEDICINE

## 2018-01-08 PROCEDURE — G8599 NO ASA/ANTIPLAT THER USE RNG: HCPCS | Performed by: FAMILY MEDICINE

## 2018-01-08 PROCEDURE — 1123F ACP DISCUSS/DSCN MKR DOCD: CPT | Performed by: FAMILY MEDICINE

## 2018-01-08 PROCEDURE — 1036F TOBACCO NON-USER: CPT | Performed by: FAMILY MEDICINE

## 2018-01-08 PROCEDURE — G8399 PT W/DXA RESULTS DOCUMENT: HCPCS | Performed by: FAMILY MEDICINE

## 2018-01-08 RX ORDER — IPRATROPIUM BROMIDE 21 UG/1
2 SPRAY, METERED NASAL 2 TIMES DAILY
Qty: 1 BOTTLE | Refills: 3 | Status: SHIPPED | OUTPATIENT
Start: 2018-01-08 | End: 2018-03-01

## 2018-01-08 RX ORDER — MIRTAZAPINE 7.5 MG/1
7.5 TABLET, FILM COATED ORAL NIGHTLY
Qty: 30 TABLET | Refills: 3 | Status: SHIPPED | OUTPATIENT
Start: 2018-01-08 | End: 2018-06-14 | Stop reason: SDUPTHER

## 2018-01-08 NOTE — PROGRESS NOTES
Subjective:  Triny Quinones is here to discuss the following issues. She has been suffering weight loss. She lost 21 pounds since her last visit 3 months ago. She has a poor appetite. No vomiting or diarrhea. No abdominal pain. No fever sweats or chills. No chest pain or pressure. She does have clear nasal discharge on a daily basis. Over-the-counter medicines or not helpful. This is very bothersome. She has some chronic right shoulder pain and was advised years ago she should have surgery. Her pain is now making it difficulty to help with her ADLs. She also has chronic thoracic back pain. She may have epidural injections in the past.  No radicular symptoms. No recent trauma. She has Alzheimer's dementia and history is provided in part by the patient but also by her son. Her chart is reviewed. History   Smoking Status    Former Smoker    Packs/day: 2.00    Years: 15.00    Types: Cigarettes    Quit date: 5/1/2013   Smokeless Tobacco    Never Used   Allergies:     Avandia [rosiglitazone maleate]; Codeine; Glucophage [metformin hydrochloride]; Hydrochlorothiazide; and Tramadol    Objective:  BP 84/60   Pulse 50   Temp 97.5 °F (36.4 °C) (Oral)   Wt 139 lb 3.2 oz (63.1 kg)   SpO2 97%   BMI 21.17 kg/m²    No acute distress, heart regular rate and rhythm without murmur, lungs clear to auscultation easy effort, abdomen soft nondistended, no clubbing or cyanosis right shoulder shows greatly decreased range of motion due to pain and difficulty with examination due to tenderness. She has bilateral thoracic area muscle spasm and tenderness. She appears somewhat confused    Assessment:  1. Weight loss    2. Rhinorrhea    3. Chronic right shoulder pain    4. Chronic bilateral thoracic back pain    5.  Late onset Alzheimer's disease without behavioral disturbance        Quality & Risk Score Accuracy - MEDICARE ADVANTAGE    Last edited 01/08/18 15:21 EST by Bushra Cardona MD         Plan:  Farida Henry for appetite stimulation  Atrovent nasal spray  Ortho referral for her shoulder and back symptoms  Continue other medicines  Follow-up in 4 months or p.r.n. \"Healthy Family Handout\" provided  Avoid exposure to all tobacco products. Read and consider all information provided by the pharmacy regarding prescribed medications before use  Careful medical compliance  Proper diet and weight management   Otherwise continue current treatment plan  Call or return if symptoms are not well controlled  Go to ED if severe/significant symptoms occur    All medical conditions for this patient are stable unless otherwise indicated    Katie Campo MD    This note was transcribed using a voice recognition software system. Proper technique and careful oversight were used to increase transcription accuracy but inadvertent errors may be present.

## 2018-01-08 NOTE — CARE COORDINATION
Ambulatory Care Coordination Note  1/8/2018  CM Risk Score: 11  Sruthi Mortality Risk Score: 21.62    ACC: Mila Gonzalez, RN    Summary Note:   Discussed CC grad with PCP  Met with patient and patient's son following OV with PCP  Mr. Tariq Fritz wishes to continue with monthly f/u calls  Mr. Tariq Frizt is the primary caregiver for the patient and he is expressing he feels frustrated at times. Discussed Respite Care/Adult Day Care  Discussed Alzheimer's Association Hotline and encouraged Mr. Reece to contact them especially with assistance regarding challenging behaviors    PLAN:  Provided contact information for the Alzheimer's organization  Consider Respite Care/Adult Day Care  Review the following informaton:  Stages and Behaviors (Hand out from the Alzheimer's Organization)  How to Manage Challenging Behaviors (Hand out from the Alzheimer's Organization)  Encouraged participation with Alzheimer's Whisperer Program (Suresh Oconnell RN with [facility])  Continue monthly/PRN  f/u with [address] Coordination Interventions    Program Enrollment:  Rising Risk  Referral from Primary Care Provider:  No  Suggested Interventions and Community Resources  Diabetes Education: In Process  Fall Risk Prevention: In Process  Home Care Waiver:  Completed  Home Health Services:  Completed  Life Skills Coaching:  (Comment: skills coaching with caregiver)  Medi Set or Pill Pack:  Completed  Physical Therapy:  Completed  Senior Services:  Completed  Specialty Services Referral:  Completed (Comment: [facility]-Jose method/Alzheimers Whisper program)  Other Services: In Process (Comment: [facility] Method/Alzheimer's [facility] Program)  Zone Management Tools: In Process         Goals Addressed     None          Prior to Admission medications    Medication Sig Start Date End Date Taking?  Authorizing Provider   mirtazapine (REMERON) 7.5 MG tablet Take 1 tablet by mouth nightly 1/8/18   Ankur Girard MD   ipratropium

## 2018-01-08 NOTE — PATIENT INSTRUCTIONS
Please read the healthy family handout that you were given and share it with your family. Please compare this printed medication list with your medications at home to be sure they are the same. If you have any medications that are different please contact us immediately at [phone]. Also review your allergies that we have listed, these may also include medications that you have not been able to tolerate, make sure everything listed is correct. If you have any allergies that are different please contact us immediately at [phone].

## 2018-01-25 ENCOUNTER — OFFICE VISIT (OUTPATIENT)
Dept: ORTHOPEDIC SURGERY | Age: 78
End: 2018-01-25

## 2018-01-25 VITALS
BODY MASS INDEX: 21.08 KG/M2 | WEIGHT: 139.11 LBS | DIASTOLIC BLOOD PRESSURE: 44 MMHG | HEART RATE: 57 BPM | HEIGHT: 68 IN | SYSTOLIC BLOOD PRESSURE: 77 MMHG

## 2018-01-25 DIAGNOSIS — M19.011 OSTEOARTHRITIS OF RIGHT AC (ACROMIOCLAVICULAR) JOINT: ICD-10-CM

## 2018-01-25 DIAGNOSIS — M75.41 SHOULDER IMPINGEMENT, RIGHT: ICD-10-CM

## 2018-01-25 DIAGNOSIS — M25.511 RIGHT SHOULDER PAIN, UNSPECIFIED CHRONICITY: Primary | ICD-10-CM

## 2018-01-25 PROCEDURE — 4040F PNEUMOC VAC/ADMIN/RCVD: CPT | Performed by: ORTHOPAEDIC SURGERY

## 2018-01-25 PROCEDURE — 1090F PRES/ABSN URINE INCON ASSESS: CPT | Performed by: ORTHOPAEDIC SURGERY

## 2018-01-25 PROCEDURE — 73030 X-RAY EXAM OF SHOULDER: CPT | Performed by: ORTHOPAEDIC SURGERY

## 2018-01-25 PROCEDURE — G8420 CALC BMI NORM PARAMETERS: HCPCS | Performed by: ORTHOPAEDIC SURGERY

## 2018-01-25 PROCEDURE — G8599 NO ASA/ANTIPLAT THER USE RNG: HCPCS | Performed by: ORTHOPAEDIC SURGERY

## 2018-01-25 PROCEDURE — G8484 FLU IMMUNIZE NO ADMIN: HCPCS | Performed by: ORTHOPAEDIC SURGERY

## 2018-01-25 PROCEDURE — G8399 PT W/DXA RESULTS DOCUMENT: HCPCS | Performed by: ORTHOPAEDIC SURGERY

## 2018-01-25 PROCEDURE — 99203 OFFICE O/P NEW LOW 30 MIN: CPT | Performed by: ORTHOPAEDIC SURGERY

## 2018-01-25 PROCEDURE — G8427 DOCREV CUR MEDS BY ELIG CLIN: HCPCS | Performed by: ORTHOPAEDIC SURGERY

## 2018-01-25 PROCEDURE — 1123F ACP DISCUSS/DSCN MKR DOCD: CPT | Performed by: ORTHOPAEDIC SURGERY

## 2018-01-25 PROCEDURE — 20611 DRAIN/INJ JOINT/BURSA W/US: CPT | Performed by: ORTHOPAEDIC SURGERY

## 2018-01-25 PROCEDURE — 1036F TOBACCO NON-USER: CPT | Performed by: ORTHOPAEDIC SURGERY

## 2018-02-05 RX ORDER — TRAZODONE HYDROCHLORIDE 50 MG/1
TABLET ORAL
Qty: 60 TABLET | Refills: 5 | Status: SHIPPED | OUTPATIENT
Start: 2018-02-05 | End: 2018-03-01

## 2018-02-05 RX ORDER — TOPIRAMATE 25 MG/1
TABLET ORAL
Qty: 120 TABLET | Refills: 5 | Status: SHIPPED | OUTPATIENT
Start: 2018-02-05 | End: 2018-03-01

## 2018-02-05 RX ORDER — RIZATRIPTAN BENZOATE 10 MG/1
TABLET ORAL
Qty: 9 TABLET | Refills: 3 | Status: SHIPPED | OUTPATIENT
Start: 2018-02-05 | End: 2018-06-14 | Stop reason: SDUPTHER

## 2018-02-12 ENCOUNTER — CARE COORDINATION (OUTPATIENT)
Dept: CARE COORDINATION | Age: 78
End: 2018-02-12

## 2018-02-12 NOTE — CARE COORDINATION
Ambulatory Care Coordination Note  2/12/2018  CM Risk Score: 14  Sruthi Mortality Risk Score: 21.62    ACC: Viktoria Buck RN    Summary Note:     Attempted to contact  patient; left message to return call  Contact information provided    PLAN:   Will send the following information for review:  Alzheimer's Education Program: Location [facility] on 3/13/18 and 3/20/18  Flu Basics  Influenza            Care Coordination Interventions    Program Enrollment:  Rising Risk  Referral from Primary Care Provider:  No  Suggested Interventions and Community Resources  Fall Risk Prevention: In Process  Home Care Waiver:  Completed  Home Health Services:  Completed  Life Skills Coaching:  (Comment: skills coaching with caregiver)  Medi Set or Pill Pack:  Completed  Physical Therapy:  Completed  Senior Services:  Completed  Specialty Services Referral:  Completed (Comment: [facility]-Jose method/Alzheimers Whisper program)  Other Services: In Process (Comment: [facility] Method/Alzheimer's [facility] Program)  Zone Management Tools: In Process         Goals Addressed     None          Prior to Admission medications    Medication Sig Start Date End Date Taking?  Authorizing Provider   topiramate (TOPAMAX) 25 MG tablet TAKE ONE TABLET BY MOUTH TWICE A DAY FOR 1 WEEK, THEN TAKE TWO TABLETS BY MOUTH TWICE A DAY 2/5/18   Matthew Chapa MD   rizatriptan (MAXALT) 10 MG tablet TAKE 1 TABLET BY MOUTH AS NEEDED FOR A MIGRAINE, MAY REPEAT THE DOSE IN 2 HOURS IF NEEDED, MAX 2 TABLETS PER 24 HOURS 2/5/18   Matthew Chapa MD   traZODone (DESYREL) 50 MG tablet TAKE ONE TO TWO TABLETS BY MOUTH ONCE NIGHTLY 2/5/18   Matthew Chapa MD   mirtazapine (REMERON) 7.5 MG tablet Take 1 tablet by mouth nightly 1/8/18   Matthew Chapa MD   ipratropium (ATROVENT) 0.03 % nasal spray 2 sprays by Nasal route 2 times daily 1/8/18 1/8/19  Matthew Chapa MD   donepezil (ARICEPT) 10 MG tablet TAKE ONE TABLET BY MOUTH ONCE NIGHTLY 12/4/17   Elvia Velasquez MD   dicyclomine (BENTYL) 10 MG capsule TAKE ONE CAPSULE BY MOUTH THREE TIMES A DAY AS NEEDED FOR BOWEL SYMPTOMS 12/4/17   Elvia Velasquez MD   hyoscyamine (LEVSIN) 125 MCG tablet Take 1 tablet by mouth 2 times daily as needed for Cramping 10/2/17   Elvia Velasquez MD   diclofenac sodium (VOLTAREN) 1 % GEL Apply 4 g topically 4 times daily as needed for Pain 9/15/17   Elvia Velasquez MD   Multiple Vitamins-Minerals (MULTIVITAMIN WITH MINERALS) tablet Take 1 tablet by mouth daily 8/21/17   Elvia Velasquez MD   montelukast (SINGULAIR) 10 MG tablet Take 1 tablet by mouth daily 8/18/17   Elvia Velasquez MD   memantine [facility]) 5 MG tablet Take 1 tablet by mouth 2 times daily 6/13/17   Elvia Velasquez MD   fluticasone [facility]) 50 MCG/ACT nasal spray 1 spray by Nasal route daily    Historical Provider, MD   aspirin EC 81 MG EC tablet Take 1 tablet by mouth daily 6/13/16   Elvia Velasquez MD   Cholecalciferol (VITAMIN D-3) 1000 UNITS CAPS Take 3,000 Units by mouth daily 6/13/16   Elvia Velasquez MD   vitamin B-12 (CYANOCOBALAMIN) 500 MCG tablet Take 1 tablet by mouth daily 2/4/16   Elvia Velasquez MD       No future appointments.

## 2018-02-12 NOTE — PATIENT INSTRUCTIONS
Patient Education        Influenza (Flu): Care Instructions  Your Care Instructions    Influenza (flu) is an infection in the lungs and breathing passages. It is caused by the influenza virus. There are different strains, or types, of the flu virus from year to year. Unlike the common cold, the flu comes on suddenly and the symptoms, such as a cough, congestion, fever, chills, fatigue, aches, and pains, are more severe. These symptoms may last up to 10 days. Although the flu can make you feel very sick, it usually doesn't cause serious health problems. Home treatment is usually all you need for flu symptoms. But your doctor may prescribe antiviral medicine to prevent other health problems, such as pneumonia, from developing. Older people and those who have a long-term health condition, such as lung disease, are most at risk for having pneumonia or other health problems. Follow-up care is a key part of your treatment and safety. Be sure to make and go to all appointments, and call your doctor if you are having problems. It's also a good idea to know your test results and keep a list of the medicines you take. How can you care for yourself at home? · Get plenty of rest.  · Drink plenty of fluids, enough so that your urine is light yellow or clear like water. If you have kidney, heart, or liver disease and have to limit fluids, talk with your doctor before you increase the amount of fluids you drink. · Take an over-the-counter pain medicine if needed, such as acetaminophen (Tylenol), ibuprofen (Advil, Motrin), or naproxen (Aleve), to relieve fever, headache, and muscle aches. Read and follow all instructions on the label. No one younger than 20 should take aspirin. It has been linked to Reye syndrome, a serious illness. · Do not smoke. Smoking can make the flu worse. If you need help quitting, talk to your doctor about stop-smoking programs and medicines.  These can increase your chances of quitting for

## 2018-03-01 ENCOUNTER — OFFICE VISIT (OUTPATIENT)
Dept: FAMILY MEDICINE CLINIC | Age: 78
End: 2018-03-01

## 2018-03-01 VITALS
HEART RATE: 48 BPM | OXYGEN SATURATION: 99 % | DIASTOLIC BLOOD PRESSURE: 66 MMHG | SYSTOLIC BLOOD PRESSURE: 96 MMHG | BODY MASS INDEX: 20.74 KG/M2 | TEMPERATURE: 97.3 F | WEIGHT: 136.4 LBS

## 2018-03-01 DIAGNOSIS — F02.80 LATE ONSET ALZHEIMER'S DISEASE WITHOUT BEHAVIORAL DISTURBANCE (HCC): Primary | ICD-10-CM

## 2018-03-01 DIAGNOSIS — J34.89 NASAL DISCHARGE: ICD-10-CM

## 2018-03-01 DIAGNOSIS — J40 BRONCHITIS: ICD-10-CM

## 2018-03-01 DIAGNOSIS — G43.709 CHRONIC MIGRAINE WITHOUT AURA WITHOUT STATUS MIGRAINOSUS, NOT INTRACTABLE: ICD-10-CM

## 2018-03-01 DIAGNOSIS — G30.1 LATE ONSET ALZHEIMER'S DISEASE WITHOUT BEHAVIORAL DISTURBANCE (HCC): Primary | ICD-10-CM

## 2018-03-01 DIAGNOSIS — G89.29 CHRONIC MIDLINE THORACIC BACK PAIN: ICD-10-CM

## 2018-03-01 DIAGNOSIS — M54.6 CHRONIC MIDLINE THORACIC BACK PAIN: ICD-10-CM

## 2018-03-01 DIAGNOSIS — I10 ESSENTIAL HYPERTENSION: ICD-10-CM

## 2018-03-01 DIAGNOSIS — Z91.199 NONCOMPLIANCE: ICD-10-CM

## 2018-03-01 PROCEDURE — G8484 FLU IMMUNIZE NO ADMIN: HCPCS | Performed by: FAMILY MEDICINE

## 2018-03-01 PROCEDURE — 1090F PRES/ABSN URINE INCON ASSESS: CPT | Performed by: FAMILY MEDICINE

## 2018-03-01 PROCEDURE — 1123F ACP DISCUSS/DSCN MKR DOCD: CPT | Performed by: FAMILY MEDICINE

## 2018-03-01 PROCEDURE — G8599 NO ASA/ANTIPLAT THER USE RNG: HCPCS | Performed by: FAMILY MEDICINE

## 2018-03-01 PROCEDURE — G8420 CALC BMI NORM PARAMETERS: HCPCS | Performed by: FAMILY MEDICINE

## 2018-03-01 PROCEDURE — G8399 PT W/DXA RESULTS DOCUMENT: HCPCS | Performed by: FAMILY MEDICINE

## 2018-03-01 PROCEDURE — G8427 DOCREV CUR MEDS BY ELIG CLIN: HCPCS | Performed by: FAMILY MEDICINE

## 2018-03-01 PROCEDURE — 99214 OFFICE O/P EST MOD 30 MIN: CPT | Performed by: FAMILY MEDICINE

## 2018-03-01 PROCEDURE — 4040F PNEUMOC VAC/ADMIN/RCVD: CPT | Performed by: FAMILY MEDICINE

## 2018-03-01 PROCEDURE — 1036F TOBACCO NON-USER: CPT | Performed by: FAMILY MEDICINE

## 2018-03-01 RX ORDER — AZITHROMYCIN 250 MG/1
TABLET, FILM COATED ORAL
Qty: 6 TABLET | Refills: 0 | Status: SHIPPED | OUTPATIENT
Start: 2018-03-01 | End: 2018-03-11

## 2018-03-01 RX ORDER — VALPROIC ACID 250 MG/1
250 CAPSULE, LIQUID FILLED ORAL 2 TIMES DAILY
Qty: 60 CAPSULE | Refills: 3 | Status: SHIPPED | OUTPATIENT
Start: 2018-03-01 | End: 2018-07-20 | Stop reason: SDUPTHER

## 2018-03-01 NOTE — PROGRESS NOTES
Patient's son given order for Thoracic-Lumbar x-ray
Quality & Risk Score Accuracy - MEDICARE ADVANTAGE    Last edited 03/01/18 13:31 EST by Mojgan Flower MD         Plan:  Stop Topamax  Depakote  Her son feels the Topamax may be causing her nasal discharge  Z-David  Thoracic x-ray  She declines Atrovent or Flonase nasal spray  Continue other medicines  Follow-up as scheduled or p.r.n. \"Healthy Family Handout\" provided  Avoid exposure to all tobacco products. Read and consider all information provided by the pharmacy regarding prescribed medications before use  Careful medical compliance  Proper diet and weight management   Otherwise continue current treatment plan  Call or return if symptoms are not well controlled  Go to ED if severe/significant symptoms occur    All medical conditions for this patient are stable unless otherwise indicated    Matthew Castro MD    This note was transcribed using a voice recognition software system. Proper technique and careful oversight were used to increase transcription accuracy but inadvertent errors may be present.

## 2018-03-05 RX ORDER — MEMANTINE HYDROCHLORIDE 5 MG/1
TABLET ORAL
Qty: 60 TABLET | Refills: 2 | Status: SHIPPED | OUTPATIENT
Start: 2018-03-05 | End: 2018-06-14 | Stop reason: SDUPTHER

## 2018-04-09 RX ORDER — DICYCLOMINE HYDROCHLORIDE 10 MG/1
CAPSULE ORAL
Qty: 120 CAPSULE | Refills: 2 | Status: SHIPPED | OUTPATIENT
Start: 2018-04-09 | End: 2018-07-20 | Stop reason: SDUPTHER

## 2018-04-12 ENCOUNTER — CARE COORDINATION (OUTPATIENT)
Dept: CARE COORDINATION | Age: 78
End: 2018-04-12

## 2018-04-13 ENCOUNTER — OFFICE VISIT (OUTPATIENT)
Dept: FAMILY MEDICINE CLINIC | Age: 78
End: 2018-04-13

## 2018-04-13 VITALS
DIASTOLIC BLOOD PRESSURE: 79 MMHG | TEMPERATURE: 97.7 F | HEART RATE: 51 BPM | OXYGEN SATURATION: 98 % | SYSTOLIC BLOOD PRESSURE: 112 MMHG | BODY MASS INDEX: 22.3 KG/M2 | WEIGHT: 146.6 LBS

## 2018-04-13 DIAGNOSIS — R60.0 LOWER EXTREMITY EDEMA: ICD-10-CM

## 2018-04-13 DIAGNOSIS — I10 ESSENTIAL HYPERTENSION: Primary | ICD-10-CM

## 2018-04-13 DIAGNOSIS — E55.9 VITAMIN D DEFICIENCY: ICD-10-CM

## 2018-04-13 DIAGNOSIS — E78.00 HYPERCHOLESTEREMIA: ICD-10-CM

## 2018-04-13 DIAGNOSIS — G43.709 CHRONIC MIGRAINE WITHOUT AURA WITHOUT STATUS MIGRAINOSUS, NOT INTRACTABLE: ICD-10-CM

## 2018-04-13 DIAGNOSIS — M51.9 LUMBAR DISC DISEASE: ICD-10-CM

## 2018-04-13 DIAGNOSIS — J34.89 NASAL DISCHARGE: ICD-10-CM

## 2018-04-13 DIAGNOSIS — Z79.899 MEDICATION MANAGEMENT: ICD-10-CM

## 2018-04-13 DIAGNOSIS — E53.8 B12 DEFICIENCY: ICD-10-CM

## 2018-04-13 PROBLEM — R55 SYNCOPE: Status: RESOLVED | Noted: 2017-09-05 | Resolved: 2018-04-13

## 2018-04-13 LAB
BASOPHILS ABSOLUTE: 0 K/UL (ref 0–0.2)
BASOPHILS RELATIVE PERCENT: 1.2 %
EOSINOPHILS ABSOLUTE: 0.2 K/UL (ref 0–0.6)
EOSINOPHILS RELATIVE PERCENT: 4.4 %
HCT VFR BLD CALC: 41.8 % (ref 36–48)
HEMOGLOBIN: 13.7 G/DL (ref 12–16)
LYMPHOCYTES ABSOLUTE: 1.3 K/UL (ref 1–5.1)
LYMPHOCYTES RELATIVE PERCENT: 36 %
MCH RBC QN AUTO: 32.1 PG (ref 26–34)
MCHC RBC AUTO-ENTMCNC: 32.8 G/DL (ref 31–36)
MCV RBC AUTO: 98 FL (ref 80–100)
MONOCYTES ABSOLUTE: 0.3 K/UL (ref 0–1.3)
MONOCYTES RELATIVE PERCENT: 8.9 %
NEUTROPHILS ABSOLUTE: 1.8 K/UL (ref 1.7–7.7)
NEUTROPHILS RELATIVE PERCENT: 49.5 %
PDW BLD-RTO: 16.4 % (ref 12.4–15.4)
PLATELET # BLD: 146 K/UL (ref 135–450)
PMV BLD AUTO: 9.2 FL (ref 5–10.5)
RBC # BLD: 4.26 M/UL (ref 4–5.2)
WBC # BLD: 3.6 K/UL (ref 4–11)

## 2018-04-13 PROCEDURE — 1090F PRES/ABSN URINE INCON ASSESS: CPT | Performed by: FAMILY MEDICINE

## 2018-04-13 PROCEDURE — 1123F ACP DISCUSS/DSCN MKR DOCD: CPT | Performed by: FAMILY MEDICINE

## 2018-04-13 PROCEDURE — G8599 NO ASA/ANTIPLAT THER USE RNG: HCPCS | Performed by: FAMILY MEDICINE

## 2018-04-13 PROCEDURE — 4040F PNEUMOC VAC/ADMIN/RCVD: CPT | Performed by: FAMILY MEDICINE

## 2018-04-13 PROCEDURE — G8427 DOCREV CUR MEDS BY ELIG CLIN: HCPCS | Performed by: FAMILY MEDICINE

## 2018-04-13 PROCEDURE — G8420 CALC BMI NORM PARAMETERS: HCPCS | Performed by: FAMILY MEDICINE

## 2018-04-13 PROCEDURE — 99214 OFFICE O/P EST MOD 30 MIN: CPT | Performed by: FAMILY MEDICINE

## 2018-04-13 PROCEDURE — G8399 PT W/DXA RESULTS DOCUMENT: HCPCS | Performed by: FAMILY MEDICINE

## 2018-04-13 PROCEDURE — 1036F TOBACCO NON-USER: CPT | Performed by: FAMILY MEDICINE

## 2018-04-13 PROCEDURE — 36415 COLL VENOUS BLD VENIPUNCTURE: CPT | Performed by: FAMILY MEDICINE

## 2018-04-13 RX ORDER — FUROSEMIDE 20 MG/1
TABLET ORAL
Qty: 8 TABLET | Refills: 5 | Status: SHIPPED | OUTPATIENT
Start: 2018-04-13 | End: 2018-04-17 | Stop reason: SDUPTHER

## 2018-04-13 RX ORDER — IPRATROPIUM BROMIDE 21 UG/1
2 SPRAY, METERED NASAL 3 TIMES DAILY
Qty: 1 BOTTLE | Refills: 3 | Status: SHIPPED | OUTPATIENT
Start: 2018-04-13 | End: 2018-04-17 | Stop reason: SDUPTHER

## 2018-04-13 RX ORDER — TIZANIDINE 2 MG/1
2 TABLET ORAL EVERY 8 HOURS PRN
Qty: 40 TABLET | Refills: 3 | Status: SHIPPED | OUTPATIENT
Start: 2018-04-13 | End: 2018-04-17 | Stop reason: SDUPTHER

## 2018-04-14 LAB
ALT SERPL-CCNC: 44 U/L (ref 10–40)
ANION GAP SERPL CALCULATED.3IONS-SCNC: 10 MMOL/L (ref 3–16)
BUN BLDV-MCNC: 16 MG/DL (ref 7–20)
CALCIUM SERPL-MCNC: 9.1 MG/DL (ref 8.3–10.6)
CHLORIDE BLD-SCNC: 106 MMOL/L (ref 99–110)
CHOLESTEROL, TOTAL: 231 MG/DL (ref 0–199)
CO2: 28 MMOL/L (ref 21–32)
CREAT SERPL-MCNC: 0.5 MG/DL (ref 0.6–1.2)
GFR AFRICAN AMERICAN: >60
GFR NON-AFRICAN AMERICAN: >60
GLUCOSE BLD-MCNC: 83 MG/DL (ref 70–99)
HDLC SERPL-MCNC: 63 MG/DL (ref 40–60)
LDL CHOLESTEROL CALCULATED: 135 MG/DL
POTASSIUM SERPL-SCNC: 4.5 MMOL/L (ref 3.5–5.1)
SODIUM BLD-SCNC: 144 MMOL/L (ref 136–145)
TRIGL SERPL-MCNC: 166 MG/DL (ref 0–150)
TSH REFLEX: 1.35 UIU/ML (ref 0.27–4.2)
VITAMIN B-12: 1510 PG/ML (ref 211–911)
VITAMIN D 25-HYDROXY: 76.9 NG/ML
VLDLC SERPL CALC-MCNC: 33 MG/DL

## 2018-04-17 LAB — VALPROIC ACID, FREE: 2.3 UG/ML (ref 7–23)

## 2018-04-17 RX ORDER — FUROSEMIDE 20 MG/1
TABLET ORAL
Qty: 8 TABLET | Refills: 5 | Status: SHIPPED | OUTPATIENT
Start: 2018-04-17 | End: 2019-05-14 | Stop reason: ALTCHOICE

## 2018-04-17 RX ORDER — TIZANIDINE 2 MG/1
2 TABLET ORAL EVERY 8 HOURS PRN
Qty: 40 TABLET | Refills: 3 | Status: SHIPPED | OUTPATIENT
Start: 2018-04-17 | End: 2018-10-04

## 2018-04-17 RX ORDER — IPRATROPIUM BROMIDE 21 UG/1
2 SPRAY, METERED NASAL 3 TIMES DAILY
Qty: 1 BOTTLE | Refills: 3 | Status: SHIPPED | OUTPATIENT
Start: 2018-04-17 | End: 2018-10-04 | Stop reason: ALTCHOICE

## 2018-04-19 PROBLEM — I67.89 CEREBRAL MICROVASCULOPATHY: Status: ACTIVE | Noted: 2018-04-19

## 2018-04-23 ENCOUNTER — CARE COORDINATION (OUTPATIENT)
Dept: CARE COORDINATION | Age: 78
End: 2018-04-23

## 2018-04-24 ENCOUNTER — TELEPHONE (OUTPATIENT)
Dept: FAMILY MEDICINE CLINIC | Age: 78
End: 2018-04-24

## 2018-04-24 RX ORDER — PRAVASTATIN SODIUM 40 MG
40 TABLET ORAL EVERY EVENING
Qty: 30 TABLET | Refills: 5 | Status: SHIPPED | OUTPATIENT
Start: 2018-04-24 | End: 2018-10-04

## 2018-05-02 ENCOUNTER — OFFICE VISIT (OUTPATIENT)
Dept: FAMILY MEDICINE CLINIC | Age: 78
End: 2018-05-02

## 2018-05-02 ENCOUNTER — HOSPITAL ENCOUNTER (OUTPATIENT)
Dept: OTHER | Age: 78
Discharge: HOME | End: 2018-05-02
Attending: FAMILY MEDICINE | Admitting: FAMILY MEDICINE

## 2018-05-02 VITALS
WEIGHT: 150 LBS | OXYGEN SATURATION: 97 % | SYSTOLIC BLOOD PRESSURE: 136 MMHG | BODY MASS INDEX: 23.49 KG/M2 | HEART RATE: 53 BPM | TEMPERATURE: 97.7 F | DIASTOLIC BLOOD PRESSURE: 72 MMHG
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
## 2018-05-02 DIAGNOSIS — M53.3 COCCYXDYNIA: Primary | ICD-10-CM

## 2018-05-02 DIAGNOSIS — M54.6 CHRONIC MIDLINE THORACIC BACK PAIN: ICD-10-CM

## 2018-05-02 DIAGNOSIS — M53.3 COCCYXDYNIA: ICD-10-CM

## 2018-05-02 DIAGNOSIS — G89.29 CHRONIC MIDLINE THORACIC BACK PAIN: ICD-10-CM

## 2018-05-02 DIAGNOSIS — M54.6 PAIN IN THORACIC SPINE: ICD-10-CM

## 2018-05-02 DIAGNOSIS — R60.0 LOWER EXTREMITY EDEMA: ICD-10-CM

## 2018-05-02 DIAGNOSIS — I95.9 HYPOTENSION, UNSPECIFIED HYPOTENSION TYPE: ICD-10-CM

## 2018-05-02 DIAGNOSIS — M54.5 CHRONIC LOW BACK PAIN, UNSPECIFIED BACK PAIN LATERALITY, WITH SCIATICA PRESENCE UNSPECIFIED: ICD-10-CM

## 2018-05-02 DIAGNOSIS — G89.29 CHRONIC LOW BACK PAIN, UNSPECIFIED BACK PAIN LATERALITY, WITH SCIATICA PRESENCE UNSPECIFIED: ICD-10-CM

## 2018-05-02 PROCEDURE — G8599 NO ASA/ANTIPLAT THER USE RNG: HCPCS | Performed by: FAMILY MEDICINE

## 2018-05-02 PROCEDURE — 1123F ACP DISCUSS/DSCN MKR DOCD: CPT | Performed by: FAMILY MEDICINE

## 2018-05-02 PROCEDURE — 4040F PNEUMOC VAC/ADMIN/RCVD: CPT | Performed by: FAMILY MEDICINE

## 2018-05-02 PROCEDURE — 99214 OFFICE O/P EST MOD 30 MIN: CPT | Performed by: FAMILY MEDICINE

## 2018-05-02 PROCEDURE — G8427 DOCREV CUR MEDS BY ELIG CLIN: HCPCS | Performed by: FAMILY MEDICINE

## 2018-05-02 PROCEDURE — 1090F PRES/ABSN URINE INCON ASSESS: CPT | Performed by: FAMILY MEDICINE

## 2018-05-02 PROCEDURE — G8420 CALC BMI NORM PARAMETERS: HCPCS | Performed by: FAMILY MEDICINE

## 2018-05-02 PROCEDURE — G8399 PT W/DXA RESULTS DOCUMENT: HCPCS | Performed by: FAMILY MEDICINE

## 2018-05-02 PROCEDURE — 1036F TOBACCO NON-USER: CPT | Performed by: FAMILY MEDICINE

## 2018-05-02 RX ORDER — HYDROCODONE BITARTRATE AND ACETAMINOPHEN 5; 325 MG/1; MG/1
.5-1 TABLET ORAL EVERY 4 HOURS PRN
Qty: 18 TABLET | Refills: 0 | Status: SHIPPED | OUTPATIENT
Start: 2018-05-02 | End: 2018-05-07

## 2018-05-03 ENCOUNTER — TELEPHONE (OUTPATIENT)
Dept: FAMILY MEDICINE CLINIC | Age: 78
End: 2018-05-03

## 2018-05-03 PROBLEM — E11.9 DIET-CONTROLLED DIABETES MELLITUS (HCC): Status: ACTIVE | Noted: 2018-05-03

## 2018-05-03 RX ORDER — MELOXICAM 7.5 MG/1
7.5 TABLET ORAL DAILY
Qty: 14 TABLET | Refills: 3 | Status: SHIPPED | OUTPATIENT
Start: 2018-05-03 | End: 2018-10-04

## 2018-05-14 RX ORDER — MONTELUKAST SODIUM 10 MG/1
TABLET ORAL
Qty: 30 TABLET | Refills: 2 | Status: SHIPPED | OUTPATIENT
Start: 2018-05-14 | End: 2018-08-22 | Stop reason: SDUPTHER

## 2018-06-15 RX ORDER — DONEPEZIL HYDROCHLORIDE 10 MG/1
TABLET, FILM COATED ORAL
Qty: 30 TABLET | Refills: 0 | Status: SHIPPED | OUTPATIENT
Start: 2018-06-15 | End: 2018-07-20 | Stop reason: SDUPTHER

## 2018-06-15 RX ORDER — MIRTAZAPINE 7.5 MG/1
TABLET, FILM COATED ORAL
Qty: 30 TABLET | Refills: 0 | Status: SHIPPED | OUTPATIENT
Start: 2018-06-15 | End: 2018-07-20 | Stop reason: SDUPTHER

## 2018-06-15 RX ORDER — MEMANTINE HYDROCHLORIDE 5 MG/1
TABLET ORAL
Qty: 60 TABLET | Refills: 0 | Status: SHIPPED | OUTPATIENT
Start: 2018-06-15 | End: 2018-07-20 | Stop reason: SDUPTHER

## 2018-06-15 RX ORDER — RIZATRIPTAN BENZOATE 10 MG/1
TABLET ORAL
Qty: 9 TABLET | Refills: 0 | Status: SHIPPED | OUTPATIENT
Start: 2018-06-15 | End: 2018-09-24 | Stop reason: SDUPTHER

## 2018-07-06 ENCOUNTER — CARE COORDINATION (OUTPATIENT)
Dept: CARE COORDINATION | Age: 78
End: 2018-07-06

## 2018-07-06 NOTE — CARE COORDINATION
Multiple attempts to contact patient's caregiver have been unsuccessful  Will send CC unable to reach discharge letter  -Encourages them to reach out to [facility] if they wish to continue care coordination   Patient other wise meets criteria for graduation/discharge    PLAN:  Recommend d/c from care coordination  Will send the following:  Diabetes zones  updated medication list/reason to take

## 2018-07-20 RX ORDER — MEMANTINE HYDROCHLORIDE 5 MG/1
TABLET ORAL
Qty: 60 TABLET | Refills: 0 | Status: SHIPPED | OUTPATIENT
Start: 2018-07-20 | End: 2018-08-22 | Stop reason: SDUPTHER

## 2018-07-20 RX ORDER — VALPROIC ACID 250 MG/1
CAPSULE, LIQUID FILLED ORAL
Qty: 60 CAPSULE | Refills: 0 | Status: SHIPPED | OUTPATIENT
Start: 2018-07-20 | End: 2018-08-22 | Stop reason: SDUPTHER

## 2018-07-20 RX ORDER — DICYCLOMINE HYDROCHLORIDE 10 MG/1
CAPSULE ORAL
Qty: 120 CAPSULE | Refills: 0 | Status: SHIPPED | OUTPATIENT
Start: 2018-07-20 | End: 2018-08-22 | Stop reason: SDUPTHER

## 2018-07-20 RX ORDER — DONEPEZIL HYDROCHLORIDE 10 MG/1
TABLET, FILM COATED ORAL
Qty: 30 TABLET | Refills: 0 | Status: SHIPPED | OUTPATIENT
Start: 2018-07-20 | End: 2018-08-22 | Stop reason: SDUPTHER

## 2018-07-20 RX ORDER — MIRTAZAPINE 7.5 MG/1
TABLET, FILM COATED ORAL
Qty: 30 TABLET | Refills: 0 | Status: SHIPPED | OUTPATIENT
Start: 2018-07-20 | End: 2018-08-22 | Stop reason: SDUPTHER

## 2018-07-20 NOTE — TELEPHONE ENCOUNTER
Refilled medication per verbal order from MD.   Son notified of change in Dr Sachi Mejia schedule and switching to NP schedule.   He wanted to think about this and he will call back,   Advised we would send in 30 day supply on her refills but she is due for appt next month

## 2018-07-30 ENCOUNTER — TELEPHONE (OUTPATIENT)
Dept: PRIMARY CARE CLINIC | Age: 78
End: 2018-07-30

## 2018-08-03 NOTE — TELEPHONE ENCOUNTER
Son was notified and we discussed appt. Patient does have Alzheimer's son is concerned because patient remembers Dr. Blaze Hairston. He is going to think about it over the weekend and call on Monday to discuss.

## 2018-08-22 RX ORDER — DONEPEZIL HYDROCHLORIDE 10 MG/1
TABLET, FILM COATED ORAL
Qty: 30 TABLET | Refills: 2 | Status: SHIPPED | OUTPATIENT
Start: 2018-08-22 | End: 2018-11-24 | Stop reason: SDUPTHER

## 2018-08-22 RX ORDER — VALPROIC ACID 250 MG/1
CAPSULE, LIQUID FILLED ORAL
Qty: 60 CAPSULE | Refills: 2 | Status: SHIPPED | OUTPATIENT
Start: 2018-08-22 | End: 2018-11-24 | Stop reason: SDUPTHER

## 2018-08-22 RX ORDER — MONTELUKAST SODIUM 10 MG/1
TABLET ORAL
Qty: 30 TABLET | Refills: 2 | Status: SHIPPED | OUTPATIENT
Start: 2018-08-22 | End: 2018-11-24 | Stop reason: SDUPTHER

## 2018-08-22 RX ORDER — MEMANTINE HYDROCHLORIDE 5 MG/1
TABLET ORAL
Qty: 60 TABLET | Refills: 2 | Status: SHIPPED | OUTPATIENT
Start: 2018-08-22 | End: 2018-11-15 | Stop reason: SINTOL

## 2018-08-22 RX ORDER — DICYCLOMINE HYDROCHLORIDE 10 MG/1
CAPSULE ORAL
Qty: 120 CAPSULE | Refills: 2 | Status: SHIPPED | OUTPATIENT
Start: 2018-08-22 | End: 2019-02-05 | Stop reason: SDUPTHER

## 2018-08-22 RX ORDER — MIRTAZAPINE 7.5 MG/1
TABLET, FILM COATED ORAL
Qty: 30 TABLET | Refills: 2 | Status: SHIPPED | OUTPATIENT
Start: 2018-08-22 | End: 2018-11-24 | Stop reason: SDUPTHER

## 2018-08-22 NOTE — TELEPHONE ENCOUNTER
Refilled medication per verbal order from MD.  Future appt scheduled 10/04/2018  Last appt 05/02/2018

## 2018-09-25 RX ORDER — RIZATRIPTAN BENZOATE 10 MG/1
TABLET ORAL
Qty: 9 TABLET | Refills: 0 | Status: SHIPPED | OUTPATIENT
Start: 2018-09-25 | End: 2018-10-23 | Stop reason: SDUPTHER

## 2018-10-04 ENCOUNTER — OFFICE VISIT (OUTPATIENT)
Dept: FAMILY MEDICINE CLINIC | Age: 78
End: 2018-10-04
Payer: MEDICARE

## 2018-10-04 VITALS
HEART RATE: 50 BPM | TEMPERATURE: 98.7 F | WEIGHT: 165.25 LBS | OXYGEN SATURATION: 97 % | BODY MASS INDEX: 25.88 KG/M2 | SYSTOLIC BLOOD PRESSURE: 118 MMHG | DIASTOLIC BLOOD PRESSURE: 76 MMHG

## 2018-10-04 DIAGNOSIS — E55.9 VITAMIN D DEFICIENCY: ICD-10-CM

## 2018-10-04 DIAGNOSIS — M25.552 LEFT HIP PAIN: ICD-10-CM

## 2018-10-04 DIAGNOSIS — E78.00 HYPERCHOLESTEREMIA: Primary | ICD-10-CM

## 2018-10-04 DIAGNOSIS — G30.1 LATE ONSET ALZHEIMER'S DISEASE WITHOUT BEHAVIORAL DISTURBANCE (HCC): ICD-10-CM

## 2018-10-04 DIAGNOSIS — Z87.891 FORMER SMOKER, STOPPED SMOKING MANY YEARS AGO: ICD-10-CM

## 2018-10-04 DIAGNOSIS — Z51.81 MEDICATION MONITORING ENCOUNTER: ICD-10-CM

## 2018-10-04 DIAGNOSIS — F02.80 LATE ONSET ALZHEIMER'S DISEASE WITHOUT BEHAVIORAL DISTURBANCE (HCC): ICD-10-CM

## 2018-10-04 DIAGNOSIS — M51.9 LUMBAR DISC DISEASE: ICD-10-CM

## 2018-10-04 PROBLEM — I95.9 HYPOTENSION: Status: RESOLVED | Noted: 2017-04-19 | Resolved: 2018-10-04

## 2018-10-04 PROBLEM — E16.2 HYPOGLYCEMIA: Status: RESOLVED | Noted: 2017-04-19 | Resolved: 2018-10-04

## 2018-10-04 PROCEDURE — G8399 PT W/DXA RESULTS DOCUMENT: HCPCS | Performed by: NURSE PRACTITIONER

## 2018-10-04 PROCEDURE — 4040F PNEUMOC VAC/ADMIN/RCVD: CPT | Performed by: NURSE PRACTITIONER

## 2018-10-04 PROCEDURE — 99214 OFFICE O/P EST MOD 30 MIN: CPT | Performed by: NURSE PRACTITIONER

## 2018-10-04 PROCEDURE — G8419 CALC BMI OUT NRM PARAM NOF/U: HCPCS | Performed by: NURSE PRACTITIONER

## 2018-10-04 PROCEDURE — 1036F TOBACCO NON-USER: CPT | Performed by: NURSE PRACTITIONER

## 2018-10-04 PROCEDURE — 1090F PRES/ABSN URINE INCON ASSESS: CPT | Performed by: NURSE PRACTITIONER

## 2018-10-04 PROCEDURE — G8427 DOCREV CUR MEDS BY ELIG CLIN: HCPCS | Performed by: NURSE PRACTITIONER

## 2018-10-04 PROCEDURE — G8599 NO ASA/ANTIPLAT THER USE RNG: HCPCS | Performed by: NURSE PRACTITIONER

## 2018-10-04 PROCEDURE — 1123F ACP DISCUSS/DSCN MKR DOCD: CPT | Performed by: NURSE PRACTITIONER

## 2018-10-04 PROCEDURE — 36415 COLL VENOUS BLD VENIPUNCTURE: CPT | Performed by: NURSE PRACTITIONER

## 2018-10-04 PROCEDURE — 1101F PT FALLS ASSESS-DOCD LE1/YR: CPT | Performed by: NURSE PRACTITIONER

## 2018-10-04 PROCEDURE — G8484 FLU IMMUNIZE NO ADMIN: HCPCS | Performed by: NURSE PRACTITIONER

## 2018-10-04 RX ORDER — HYDROCODONE BITARTRATE AND ACETAMINOPHEN 5; 325 MG/1; MG/1
0.5 TABLET ORAL DAILY PRN
Qty: 15 TABLET | Refills: 0 | Status: SHIPPED | OUTPATIENT
Start: 2018-10-04 | End: 2018-11-15 | Stop reason: SDUPTHER

## 2018-10-04 RX ORDER — OMEGA-3-ACID ETHYL ESTERS 1 G/1
2 CAPSULE, LIQUID FILLED ORAL 2 TIMES DAILY
Qty: 60 CAPSULE | Refills: 3 | Status: SHIPPED | OUTPATIENT
Start: 2018-10-04 | End: 2018-10-22 | Stop reason: SDUPTHER

## 2018-10-04 ASSESSMENT — PATIENT HEALTH QUESTIONNAIRE - PHQ9
SUM OF ALL RESPONSES TO PHQ9 QUESTIONS 1 & 2: 0
2. FEELING DOWN, DEPRESSED OR HOPELESS: 0
SUM OF ALL RESPONSES TO PHQ QUESTIONS 1-9: 0
1. LITTLE INTEREST OR PLEASURE IN DOING THINGS: 0
SUM OF ALL RESPONSES TO PHQ QUESTIONS 1-9: 0

## 2018-10-04 NOTE — PROGRESS NOTES
heard.  Pulmonary/Chest: Effort normal and breath sounds normal. No respiratory distress. She has no wheezes. Abdominal: Soft. There is no tenderness. Musculoskeletal: She exhibits no edema. Left hip: She exhibits bony tenderness. She exhibits normal range of motion, normal strength, no swelling and no crepitus. Left knee: Normal.        Thoracic back: She exhibits tenderness. She exhibits no swelling. Lumbar back: She exhibits tenderness. She exhibits no swelling. Reproducible pain with hip abduction. Lymphadenopathy:     She has no cervical adenopathy. Neurological: She is alert. Skin: Skin is warm and dry. Psychiatric: She has a normal mood and affect. Her behavior is normal. Thought content normal.   Nursing note and vitals reviewed. Vitals:    10/04/18 1335   BP: 118/76   Pulse: 50   Temp: 98.7 °F (37.1 °C)   TempSrc: Oral   SpO2: 97%   Weight: 165 lb 4 oz (75 kg)       Assessment:  Encounter Diagnoses   Name Primary?  Hypercholesteremia Yes    Vitamin D deficiency     Lumbar disc disease     Late onset Alzheimer's disease without behavioral disturbance     Left hip pain     Former smoker, stopped smoking many years ago     Medication monitoring encounter        Plan:  1. Hypercholesteremia  Uncontrolled  Discussed with the son the role of statins and plaque stabilization. He does not wish to restart statin medication.  - Basic Metabolic Panel  - Lipid Panel  - omega-3 acid ethyl esters (LOVAZA) 1 g capsule; Take 2 capsules by mouth 2 times daily  Dispense: 60 capsule; Refill: 3    2. Vitamin D deficiency  Continue current medication    3. Lumbar disc disease  Uncontrolled  Consider orthopedic referral to discuss options based on symptom control with Vicodin use. Discussed with son potential sedation and change in mental status with opioid use. Verbal understanding  - HYDROcodone-acetaminophen (NORCO) 5-325 MG per tablet;  Take 0.5 tablets by mouth daily as

## 2018-10-04 NOTE — PATIENT INSTRUCTIONS
take.  How can you care for yourself at home? Taking care of yourself  · If your doctor gives you medicines, take them exactly as prescribed. Call your doctor if you think you are having a problem with your medicine. You will get more details on the medicines your doctor prescribes. · Eat a balanced diet. Get plenty of whole grains, fruits, and vegetables every day. If you are not hungry at mealtimes, eat snacks at midmorning and in the afternoon. Try drinks such as Boost, Ensure, or Sustacal if you are having trouble keeping your weight up. · Stay active. Exercise such as walking may slow the decline of your mental abilities. Try to stay active mentally too. Read and work crossword puzzles if you enjoy these activities. · If you have trouble sleeping, do not nap during the day. Get regular exercise (but not within several hours of bedtime). Drink a glass of warm milk or caffeine-free herbal tea before going to bed. · Ask your doctor about support groups and other resources in your area. They can help people who have Alzheimer's disease and their families. · Be patient. You may find that a task takes you longer than it used to. · If you have not already done so, make a list of advance directives. Advance directives are instructions to your doctor and family members about what kind of care you want if you become unable to speak or express yourself. Talk to a  about making a will, if you do not already have one. Keeping schedules  · Develop a routine. You will feel less frustrated or confused if you have a clear, simple plan of what to do every day. ¨ Make lists of your medicines and when to take them. ¨ Write down appointments and other tasks in a calendar. ¨ Put sticky notes around the house to help you remember events and other things you have to do. ¨ Schedule activities and tasks for times of the day when you are best able to handle them.   Staying safe  · Tell someone when you are going out and care. For example, call if:    · You have sudden chest pain and shortness of breath, or you cough up blood.     · You are not able to stand or walk or bear weight.     · Your buttocks, legs, or feet feel numb or tingly.     · Your leg or foot is cool or pale or changes color.     · You have severe pain.    Call your doctor now or seek immediate medical care if:    · You have signs of infection, such as:  ¨ Increased pain, swelling, warmth, or redness in the hip area. ¨ Red streaks leading from the hip area. ¨ Pus draining from the hip area. ¨ A fever.     · You have signs of a blood clot, such as:  ¨ Pain in your calf, back of the knee, thigh, or groin. ¨ Redness and swelling in your leg or groin.     · You are not able to bend, straighten, or move your leg normally.     · You have trouble urinating or having bowel movements.    Watch closely for changes in your health, and be sure to contact your doctor if:    · You do not get better as expected. Where can you learn more? Go to https://[email or web address]. org and sign in to your [employer] account. Enter J079 in the Odeo box to learn more about \"Hip Pain: Care Instructions. \"     If you do not have an account, please click on the \"Sign Up Now\" link. Current as of: November 20, 2017  Content Version: 11.7  © 9648-9853 [employer]. Care instructions adapted under license by [facility] ([facility]). If you have questions about a medical condition or this instruction, always ask your healthcare professional. [place] [address] any warranty or liability for your use of this information. Patient Education        Learning About High Cholesterol  What is high cholesterol? Cholesterol is a type of fat in your blood. It is needed for many body functions, such as making new cells. Cholesterol is made by your body. It also comes from food you eat.   If you have too much cholesterol, it starts to build up in your arteries. This is called hardening of the arteries, or atherosclerosis. High cholesterol raises your risk of a heart attack and stroke. There are different types of cholesterol. LDL is the \"bad\" cholesterol. High LDL can raise your risk for heart disease, heart attack, and stroke. HDL is the \"good\" cholesterol. High HDL is linked with a lower risk for heart disease, heart attack, and stroke. Your cholesterol levels help your doctor find out your risk for having a heart attack or stroke. How can you prevent high cholesterol? A heart-healthy lifestyle can help you prevent high cholesterol. This lifestyle helps lower your risk for a heart attack and stroke. · Eat heart-healthy foods. ¨ Eat fruits, vegetables, whole grains (like oatmeal), dried beans and peas, nuts and seeds, soy products (like tofu), and fat-free or low-fat dairy products. ¨ Replace butter, margarine, and hydrogenated or partially hydrogenated oils with olive and canola oils. (Canola oil margarine without trans fat is fine.)  ¨ Replace red meat with fish, poultry, and soy protein (like tofu). ¨ Limit processed and packaged foods like chips, crackers, and cookies. · Be active. Exercise can improve your cholesterol level. Get at least 30 minutes of exercise on most days of the week. Walking is a good choice. You also may want to do other activities, such as running, swimming, cycling, or playing tennis or team sports. · Stay at a healthy weight. Lose weight if you need to. · Don't smoke. If you need help quitting, talk to your doctor about stop-smoking programs and medicines. These can increase your chances of quitting for good. How is high cholesterol treated? The goal of treatment is to reduce your chances of having a heart attack or stroke. The goal is not to lower your cholesterol numbers only. · You may make lifestyle changes, such as eating healthy foods, not smoking, losing weight, and being more active.   · You may have to take

## 2018-10-05 LAB
ANION GAP SERPL CALCULATED.3IONS-SCNC: 12 MMOL/L (ref 3–16)
BUN BLDV-MCNC: 15 MG/DL (ref 7–20)
CALCIUM SERPL-MCNC: 9.5 MG/DL (ref 8.3–10.6)
CHLORIDE BLD-SCNC: 104 MMOL/L (ref 99–110)
CHOLESTEROL, TOTAL: 252 MG/DL (ref 0–199)
CO2: 27 MMOL/L (ref 21–32)
CREAT SERPL-MCNC: 0.6 MG/DL (ref 0.6–1.2)
GFR AFRICAN AMERICAN: >60
GFR NON-AFRICAN AMERICAN: >60
GLUCOSE BLD-MCNC: 101 MG/DL (ref 70–99)
HDLC SERPL-MCNC: 42 MG/DL (ref 40–60)
LDL CHOLESTEROL CALCULATED: 159 MG/DL
POTASSIUM SERPL-SCNC: 4.4 MMOL/L (ref 3.5–5.1)
SODIUM BLD-SCNC: 143 MMOL/L (ref 136–145)
TRIGL SERPL-MCNC: 254 MG/DL (ref 0–150)
VLDLC SERPL CALC-MCNC: 51 MG/DL

## 2018-10-06 LAB
VALPROIC ACID, FREE: 2.2 UG/ML (ref 7–23)
VITAMIN D 1,25-DIHYDROXY: 41.7 PG/ML (ref 19.9–79.3)

## 2018-10-18 ENCOUNTER — TELEPHONE (OUTPATIENT)
Dept: CASE MANAGEMENT | Age: 78
End: 2018-10-18

## 2018-10-18 NOTE — TELEPHONE ENCOUNTER
Shadia Gómez does not meet the age requirement to have a lung cancer screening and there may be an issue with payment, The age range covered by Medicare is 50-69; wanted to let you know due to pt being 66 and having Medicare. If you want me to try to reach the pt and cancel the testing please let me know as soon as possible. She is scheduled on 8-22-18 for the screening.

## 2018-10-22 ENCOUNTER — TELEPHONE (OUTPATIENT)
Dept: FAMILY MEDICINE CLINIC | Age: 78
End: 2018-10-22

## 2018-10-22 DIAGNOSIS — E78.00 HYPERCHOLESTEREMIA: ICD-10-CM

## 2018-10-22 RX ORDER — OMEGA-3-ACID ETHYL ESTERS 1 G/1
2 CAPSULE, LIQUID FILLED ORAL 2 TIMES DAILY
Qty: 120 CAPSULE | Refills: 3 | Status: SHIPPED | OUTPATIENT
Start: 2018-10-22 | End: 2018-11-15 | Stop reason: SINTOL

## 2018-10-24 RX ORDER — RIZATRIPTAN BENZOATE 10 MG/1
TABLET ORAL
Qty: 9 TABLET | Refills: 3 | Status: SHIPPED | OUTPATIENT
Start: 2018-10-24 | End: 2020-01-07

## 2018-11-08 ENCOUNTER — TELEPHONE (OUTPATIENT)
Dept: FAMILY MEDICINE CLINIC | Age: 78
End: 2018-11-08

## 2018-11-08 NOTE — TELEPHONE ENCOUNTER
Left message for son to call back. Denisse Gonzalez ordered Ct of lungs and left hip xray for patient on 10/4/18. Need to know if she had done or if they are going to.     Thank you

## 2018-11-09 ENCOUNTER — TELEPHONE (OUTPATIENT)
Dept: FAMILY MEDICINE CLINIC | Age: 78
End: 2018-11-09

## 2018-11-09 ENCOUNTER — APPOINTMENT (OUTPATIENT)
Dept: GENERAL RADIOLOGY | Age: 78
End: 2018-11-09
Payer: MEDICARE

## 2018-11-09 ENCOUNTER — HOSPITAL ENCOUNTER (EMERGENCY)
Age: 78
Discharge: HOME | End: 2018-11-09
Attending: EMERGENCY MEDICINE
Payer: MEDICARE

## 2018-11-09 VITALS
OXYGEN SATURATION: 96 % | RESPIRATION RATE: 14 BRPM | HEART RATE: 74 BPM | SYSTOLIC BLOOD PRESSURE: 111 MMHG | WEIGHT: 165 LBS | DIASTOLIC BLOOD PRESSURE: 74 MMHG | TEMPERATURE: 99.7 F | BODY MASS INDEX: 25.84 KG/M2

## 2018-11-09 DIAGNOSIS — J18.9 PNEUMONIA DUE TO ORGANISM: Primary | ICD-10-CM

## 2018-11-09 DIAGNOSIS — R68.89 RIGORS: ICD-10-CM

## 2018-11-09 DIAGNOSIS — R50.9 FEVER, UNSPECIFIED FEVER CAUSE: ICD-10-CM

## 2018-11-09 LAB
A/G RATIO: 1.1 (ref 1.1–2.2)
ALBUMIN SERPL-MCNC: 3.4 G/DL (ref 3.4–5)
ALP BLD-CCNC: 72 U/L (ref 40–129)
ALT SERPL-CCNC: 25 U/L (ref 10–40)
ANION GAP SERPL CALCULATED.3IONS-SCNC: 12 MMOL/L (ref 3–16)
AST SERPL-CCNC: 24 U/L (ref 15–37)
BASOPHILS ABSOLUTE: 0 K/UL (ref 0–0.2)
BASOPHILS RELATIVE PERCENT: 0.3 %
BILIRUB SERPL-MCNC: 0.6 MG/DL (ref 0–1)
BILIRUBIN URINE: NEGATIVE
BLOOD, URINE: NEGATIVE
BUN BLDV-MCNC: 16 MG/DL (ref 7–20)
CALCIUM SERPL-MCNC: 9.6 MG/DL (ref 8.3–10.6)
CHLORIDE BLD-SCNC: 103 MMOL/L (ref 99–110)
CLARITY: CLEAR
CO2: 26 MMOL/L (ref 21–32)
COLOR: YELLOW
CREAT SERPL-MCNC: 0.6 MG/DL (ref 0.6–1.2)
EOSINOPHILS ABSOLUTE: 0 K/UL (ref 0–0.6)
EOSINOPHILS RELATIVE PERCENT: 0.7 %
GFR AFRICAN AMERICAN: >60
GFR NON-AFRICAN AMERICAN: >60
GLOBULIN: 3.1 G/DL
GLUCOSE BLD-MCNC: 145 MG/DL (ref 70–99)
GLUCOSE URINE: NEGATIVE MG/DL
HCT VFR BLD CALC: 45.6 % (ref 36–48)
HEMOGLOBIN: 15 G/DL (ref 12–16)
KETONES, URINE: 15 MG/DL
LACTIC ACID, SEPSIS: 2.5 MMOL/L (ref 0.4–1.9)
LEUKOCYTE ESTERASE, URINE: NEGATIVE
LYMPHOCYTES ABSOLUTE: 0.7 K/UL (ref 1–5.1)
LYMPHOCYTES RELATIVE PERCENT: 10.4 %
MCH RBC QN AUTO: 31.1 PG (ref 26–34)
MCHC RBC AUTO-ENTMCNC: 32.8 G/DL (ref 31–36)
MCV RBC AUTO: 94.6 FL (ref 80–100)
MICROSCOPIC EXAMINATION: ABNORMAL
MONOCYTES ABSOLUTE: 0.6 K/UL (ref 0–1.3)
MONOCYTES RELATIVE PERCENT: 9.6 %
NEUTROPHILS ABSOLUTE: 5.3 K/UL (ref 1.7–7.7)
NEUTROPHILS RELATIVE PERCENT: 79 %
NITRITE, URINE: NEGATIVE
PDW BLD-RTO: 14.9 % (ref 12.4–15.4)
PH UA: 7
PLATELET # BLD: 139 K/UL (ref 135–450)
PMV BLD AUTO: 8.2 FL (ref 5–10.5)
POTASSIUM REFLEX MAGNESIUM: 4.2 MMOL/L (ref 3.5–5.1)
PROTEIN UA: NEGATIVE MG/DL
RBC # BLD: 4.82 M/UL (ref 4–5.2)
SODIUM BLD-SCNC: 141 MMOL/L (ref 136–145)
SPECIFIC GRAVITY UA: 1.02
TOTAL PROTEIN: 6.5 G/DL (ref 6.4–8.2)
URINE REFLEX TO CULTURE: ABNORMAL
URINE TYPE: ABNORMAL
UROBILINOGEN, URINE: 0.2 E.U./DL
WBC # BLD: 6.7 K/UL (ref 4–11)

## 2018-11-09 PROCEDURE — 81003 URINALYSIS AUTO W/O SCOPE: CPT

## 2018-11-09 PROCEDURE — 93010 ELECTROCARDIOGRAM REPORT: CPT | Performed by: INTERNAL MEDICINE

## 2018-11-09 PROCEDURE — 36415 COLL VENOUS BLD VENIPUNCTURE: CPT

## 2018-11-09 PROCEDURE — 71045 X-RAY EXAM CHEST 1 VIEW: CPT

## 2018-11-09 PROCEDURE — 85025 COMPLETE CBC W/AUTO DIFF WBC: CPT

## 2018-11-09 PROCEDURE — 96360 HYDRATION IV INFUSION INIT: CPT

## 2018-11-09 PROCEDURE — 2580000003 HC RX 258: Performed by: EMERGENCY MEDICINE

## 2018-11-09 PROCEDURE — 80053 COMPREHEN METABOLIC PANEL: CPT

## 2018-11-09 PROCEDURE — 6370000000 HC RX 637 (ALT 250 FOR IP): Performed by: EMERGENCY MEDICINE

## 2018-11-09 PROCEDURE — 83605 ASSAY OF LACTIC ACID: CPT

## 2018-11-09 PROCEDURE — 93005 ELECTROCARDIOGRAM TRACING: CPT | Performed by: EMERGENCY MEDICINE

## 2018-11-09 PROCEDURE — 99284 EMERGENCY DEPT VISIT MOD MDM: CPT

## 2018-11-09 PROCEDURE — 87040 BLOOD CULTURE FOR BACTERIA: CPT

## 2018-11-09 RX ORDER — 0.9 % SODIUM CHLORIDE 0.9 %
1000 INTRAVENOUS SOLUTION INTRAVENOUS ONCE
Status: COMPLETED | OUTPATIENT
Start: 2018-11-09 | End: 2018-11-09

## 2018-11-09 RX ORDER — BUTALBITAL, ACETAMINOPHEN AND CAFFEINE 50; 325; 40 MG/1; MG/1; MG/1
1 TABLET ORAL ONCE
Status: COMPLETED | OUTPATIENT
Start: 2018-11-09 | End: 2018-11-09

## 2018-11-09 RX ORDER — LEVOFLOXACIN 750 MG/1
750 TABLET ORAL DAILY
Qty: 5 TABLET | Refills: 0 | Status: SHIPPED | OUTPATIENT
Start: 2018-11-09 | End: 2018-11-14

## 2018-11-09 RX ADMIN — SODIUM CHLORIDE 1000 ML: 9 INJECTION, SOLUTION INTRAVENOUS at 16:44

## 2018-11-09 RX ADMIN — BUTALBITAL, ACETAMINOPHEN, AND CAFFEINE 1 TABLET: 50; 325; 40 TABLET ORAL at 16:49

## 2018-11-09 ASSESSMENT — PAIN SCALES - GENERAL: PAINLEVEL_OUTOF10: 7

## 2018-11-09 NOTE — ED NOTES
Discharge instructions reviewed with the patient, her son, and her granddaughter. Verbalized understanding of prescribed medication including completing entire antibiotic course and follow up care. Denies questions/concerns. Patient is alert/oriented, stable, and assisted out of the department via wheelchair to private car at the time of discharge.        Silvio Robison RN  11/09/18 [id number]

## 2018-11-09 NOTE — TELEPHONE ENCOUNTER
Son called requesting appt for today, states patient was running fever and shivering, symptoms started this morning.   Advised no openings left for today, patient states he will take patient to urgent care or ER today

## 2018-11-14 LAB
BLOOD CULTURE, ROUTINE: NORMAL
CULTURE, BLOOD 2: NORMAL

## 2018-11-15 ENCOUNTER — OFFICE VISIT (OUTPATIENT)
Dept: FAMILY MEDICINE CLINIC | Age: 78
End: 2018-11-15
Payer: MEDICARE

## 2018-11-15 VITALS
WEIGHT: 167 LBS | BODY MASS INDEX: 26.16 KG/M2 | OXYGEN SATURATION: 98 % | TEMPERATURE: 97.7 F | DIASTOLIC BLOOD PRESSURE: 72 MMHG | HEART RATE: 45 BPM | SYSTOLIC BLOOD PRESSURE: 112 MMHG

## 2018-11-15 DIAGNOSIS — R00.1 BRADYCARDIA: ICD-10-CM

## 2018-11-15 DIAGNOSIS — M51.9 LUMBAR DISC DISEASE: ICD-10-CM

## 2018-11-15 DIAGNOSIS — J18.9 PNEUMONIA OF RIGHT LOWER LOBE DUE TO INFECTIOUS ORGANISM: Primary | ICD-10-CM

## 2018-11-15 LAB
EKG ATRIAL RATE: 79 BPM
EKG DIAGNOSIS: NORMAL
EKG P AXIS: 47 DEGREES
EKG P-R INTERVAL: 174 MS
EKG Q-T INTERVAL: 356 MS
EKG QRS DURATION: 64 MS
EKG QTC CALCULATION (BAZETT): 408 MS
EKG R AXIS: 75 DEGREES
EKG T AXIS: 56 DEGREES
EKG VENTRICULAR RATE: 79 BPM

## 2018-11-15 PROCEDURE — G8599 NO ASA/ANTIPLAT THER USE RNG: HCPCS | Performed by: NURSE PRACTITIONER

## 2018-11-15 PROCEDURE — 4040F PNEUMOC VAC/ADMIN/RCVD: CPT | Performed by: NURSE PRACTITIONER

## 2018-11-15 PROCEDURE — 1036F TOBACCO NON-USER: CPT | Performed by: NURSE PRACTITIONER

## 2018-11-15 PROCEDURE — 99214 OFFICE O/P EST MOD 30 MIN: CPT | Performed by: NURSE PRACTITIONER

## 2018-11-15 PROCEDURE — G8427 DOCREV CUR MEDS BY ELIG CLIN: HCPCS | Performed by: NURSE PRACTITIONER

## 2018-11-15 PROCEDURE — G8484 FLU IMMUNIZE NO ADMIN: HCPCS | Performed by: NURSE PRACTITIONER

## 2018-11-15 PROCEDURE — 1123F ACP DISCUSS/DSCN MKR DOCD: CPT | Performed by: NURSE PRACTITIONER

## 2018-11-15 PROCEDURE — G8399 PT W/DXA RESULTS DOCUMENT: HCPCS | Performed by: NURSE PRACTITIONER

## 2018-11-15 PROCEDURE — G8419 CALC BMI OUT NRM PARAM NOF/U: HCPCS | Performed by: NURSE PRACTITIONER

## 2018-11-15 PROCEDURE — 1090F PRES/ABSN URINE INCON ASSESS: CPT | Performed by: NURSE PRACTITIONER

## 2018-11-15 PROCEDURE — 1101F PT FALLS ASSESS-DOCD LE1/YR: CPT | Performed by: NURSE PRACTITIONER

## 2018-11-15 RX ORDER — HYDROCODONE BITARTRATE AND ACETAMINOPHEN 5; 325 MG/1; MG/1
0.5 TABLET ORAL 2 TIMES DAILY
Qty: 30 TABLET | Refills: 0 | Status: SHIPPED | OUTPATIENT
Start: 2018-11-15 | End: 2019-01-10 | Stop reason: SDUPTHER

## 2018-11-15 NOTE — PATIENT INSTRUCTIONS
limits on the type or level of activity that you can do. You may want to walk, swim, bike, or do other activities. Ask your doctor what level of exercise is safe for you. ? To control your cholesterol, avoid foods with a lot of fat, saturated fat, or sodium. Try to eat more fiber. And if your doctor says it's okay, get some exercise on most days. ? Do not smoke. Smoking can make your heart condition worse. If you need help quitting, talk to your doctor about stop-smoking programs and medicines. These can increase your chances of quitting for good. ? Limit alcohol to 2 drinks a day for men and 1 drink a day for women. Too much alcohol can cause health problems. Pacemaker  If you have a pacemaker, you will get more specific information about it. Be sure to:  · Check your pulse as your doctor tells you. · Have your pacemaker checked as often as your doctor recommends. You may be able to do this over the phone or computer. · Avoid strong magnetic or electrical fields. These include MRIs, welding equipment, and generators. · You will be checked several times right after you get your pacemaker and when it is time to have the battery changed. Batteries last for 5 to 15 years. · You can talk on a cell phone. But keep it 6 inches away from your pacemaker. · Microwaves, TVs, radios, and kitchen and bathroom appliances won't harm you. When should you call for help? Call 911 anytime you think you may need emergency care. For example, call if:    · You have symptoms of sudden heart failure. These may include:  ? Severe trouble breathing. ? A fast or irregular heartbeat. ? Coughing up pink, foamy mucus. ? You passed out.     · You have symptoms of a stroke. These may include:  ? Sudden numbness, tingling, weakness, or loss of movement in your face, arm, or leg, especially on only one side of your body. ? Sudden vision changes. ? Sudden trouble speaking.   ? Sudden confusion or trouble understanding simple beating normally. The signals are painless. A pacemaker can help restore a normal heart rate. It is used when certain problems have damaged the heart's electrical system, which normally keeps your heart beating steadily. You may feel worried about having a pacemaker. This is common. It can help if you learn about how the pacemaker helps your heart. Talk to your doctor about your concerns. How is a pacemaker put in place? You will get medicine before the procedure. It helps you relax and helps prevent pain. The doctor makes a cut in the skin just below your collarbone. The cut may be on either side of your chest. The doctor will put the pacemaker leads through the cut. The leads go into a large blood vessel in the upper chest. Then the doctor will guide the leads through the blood vessel into the heart. The leads are placed in one or two of the chambers in the heart. The doctor will place the pacemaker under the skin of your chest. He or she will attach the leads to the pacemaker. Then the cut will be closed with stitches. The procedure usually takes about an hour. You may need to spend the night in the hospital.  What can you expect when you have a pacemaker? A pacemaker can help you return to a more normal, more active life. When you have a pacemaker, you need to avoid strong magnetic and electrical fields. Your doctor or the maker of your pacemaker can give you a full list of things to avoid. But most everyday appliances are safe. Your doctor will check your pacemaker regularly to make sure it is working right. Pacemaker batteries usually last 5 to 15 years before they need to be replaced. Follow-up care is a key part of your treatment and safety. Be sure to make and go to all appointments, and call your doctor if you are having problems. It's also a good idea to know your test results and keep a list of the medicines you take. Where can you learn more? Go to https://[employer]. org

## 2018-11-19 ENCOUNTER — HOSPITAL ENCOUNTER (EMERGENCY)
Age: 78
Discharge: HOME | End: 2018-11-19
Attending: EMERGENCY MEDICINE
Payer: MEDICARE

## 2018-11-19 ENCOUNTER — APPOINTMENT (OUTPATIENT)
Dept: GENERAL RADIOLOGY | Age: 78
End: 2018-11-19
Payer: MEDICARE

## 2018-11-19 VITALS
DIASTOLIC BLOOD PRESSURE: 71 MMHG | HEIGHT: 67 IN | OXYGEN SATURATION: 95 % | SYSTOLIC BLOOD PRESSURE: 106 MMHG | WEIGHT: 165 LBS | TEMPERATURE: 98 F | BODY MASS INDEX: 25.9 KG/M2 | RESPIRATION RATE: 16 BRPM | HEART RATE: 58 BPM

## 2018-11-19 DIAGNOSIS — R53.83 OTHER FATIGUE: ICD-10-CM

## 2018-11-19 DIAGNOSIS — R00.1 BRADYCARDIA: ICD-10-CM

## 2018-11-19 DIAGNOSIS — R11.0 NAUSEA: Primary | ICD-10-CM

## 2018-11-19 LAB
A/G RATIO: 1.2 (ref 1.1–2.2)
ALBUMIN SERPL-MCNC: 3.5 G/DL (ref 3.4–5)
ALP BLD-CCNC: 75 U/L (ref 40–129)
ALT SERPL-CCNC: 31 U/L (ref 10–40)
ANION GAP SERPL CALCULATED.3IONS-SCNC: 9 MMOL/L (ref 3–16)
AST SERPL-CCNC: 35 U/L (ref 15–37)
BACTERIA: ABNORMAL /HPF
BASOPHILS ABSOLUTE: 0 K/UL (ref 0–0.2)
BASOPHILS RELATIVE PERCENT: 0.5 %
BILIRUB SERPL-MCNC: 0.3 MG/DL (ref 0–1)
BILIRUBIN URINE: NEGATIVE
BLOOD, URINE: NEGATIVE
BUN BLDV-MCNC: 12 MG/DL (ref 7–20)
CALCIUM SERPL-MCNC: 9.7 MG/DL (ref 8.3–10.6)
CHLORIDE BLD-SCNC: 106 MMOL/L (ref 99–110)
CLARITY: CLEAR
CO2: 28 MMOL/L (ref 21–32)
COLOR: YELLOW
CREAT SERPL-MCNC: 0.6 MG/DL (ref 0.6–1.2)
EOSINOPHILS ABSOLUTE: 0.2 K/UL (ref 0–0.6)
EOSINOPHILS RELATIVE PERCENT: 3.9 %
EPITHELIAL CELLS, UA: ABNORMAL /HPF
GFR AFRICAN AMERICAN: >60
GFR NON-AFRICAN AMERICAN: >60
GLOBULIN: 3 G/DL
GLUCOSE BLD-MCNC: 120 MG/DL (ref 70–99)
GLUCOSE URINE: NEGATIVE MG/DL
HCT VFR BLD CALC: 43 % (ref 36–48)
HEMOGLOBIN: 14.1 G/DL (ref 12–16)
KETONES, URINE: NEGATIVE MG/DL
LACTIC ACID: 2 MMOL/L (ref 0.4–2)
LEUKOCYTE ESTERASE, URINE: ABNORMAL
LYMPHOCYTES ABSOLUTE: 1.4 K/UL (ref 1–5.1)
LYMPHOCYTES RELATIVE PERCENT: 35.5 %
MCH RBC QN AUTO: 30.9 PG (ref 26–34)
MCHC RBC AUTO-ENTMCNC: 32.7 G/DL (ref 31–36)
MCV RBC AUTO: 94.3 FL (ref 80–100)
MICROSCOPIC EXAMINATION: YES
MONOCYTES ABSOLUTE: 0.4 K/UL (ref 0–1.3)
MONOCYTES RELATIVE PERCENT: 10.1 %
NEUTROPHILS ABSOLUTE: 1.9 K/UL (ref 1.7–7.7)
NEUTROPHILS RELATIVE PERCENT: 50 %
NITRITE, URINE: NEGATIVE
PDW BLD-RTO: 14.6 % (ref 12.4–15.4)
PH UA: 7.5
PLATELET # BLD: 163 K/UL (ref 135–450)
PMV BLD AUTO: 7.5 FL (ref 5–10.5)
POTASSIUM REFLEX MAGNESIUM: 4.2 MMOL/L (ref 3.5–5.1)
PROTEIN UA: NEGATIVE MG/DL
RAPID INFLUENZA  B AGN: NEGATIVE
RAPID INFLUENZA A AGN: NEGATIVE
RBC # BLD: 4.56 M/UL (ref 4–5.2)
RBC UA: ABNORMAL /HPF (ref 0–2)
SODIUM BLD-SCNC: 143 MMOL/L (ref 136–145)
SPECIFIC GRAVITY UA: 1.01
TOTAL PROTEIN: 6.5 G/DL (ref 6.4–8.2)
TROPONIN: <0.01 NG/ML
URINE REFLEX TO CULTURE: YES
URINE TYPE: ABNORMAL
UROBILINOGEN, URINE: 0.2 E.U./DL
WBC # BLD: 3.9 K/UL (ref 4–11)
WBC UA: ABNORMAL /HPF (ref 0–5)

## 2018-11-19 PROCEDURE — 85025 COMPLETE CBC W/AUTO DIFF WBC: CPT

## 2018-11-19 PROCEDURE — 87040 BLOOD CULTURE FOR BACTERIA: CPT

## 2018-11-19 PROCEDURE — 96374 THER/PROPH/DIAG INJ IV PUSH: CPT

## 2018-11-19 PROCEDURE — 71046 X-RAY EXAM CHEST 2 VIEWS: CPT

## 2018-11-19 PROCEDURE — 2580000003 HC RX 258: Performed by: PHYSICIAN ASSISTANT

## 2018-11-19 PROCEDURE — 93010 ELECTROCARDIOGRAM REPORT: CPT | Performed by: INTERNAL MEDICINE

## 2018-11-19 PROCEDURE — 99284 EMERGENCY DEPT VISIT MOD MDM: CPT

## 2018-11-19 PROCEDURE — 87804 INFLUENZA ASSAY W/OPTIC: CPT

## 2018-11-19 PROCEDURE — 81001 URINALYSIS AUTO W/SCOPE: CPT

## 2018-11-19 PROCEDURE — 93005 ELECTROCARDIOGRAM TRACING: CPT | Performed by: PHYSICIAN ASSISTANT

## 2018-11-19 PROCEDURE — 80053 COMPREHEN METABOLIC PANEL: CPT

## 2018-11-19 PROCEDURE — 6360000002 HC RX W HCPCS: Performed by: PHYSICIAN ASSISTANT

## 2018-11-19 PROCEDURE — 83605 ASSAY OF LACTIC ACID: CPT

## 2018-11-19 PROCEDURE — 36415 COLL VENOUS BLD VENIPUNCTURE: CPT

## 2018-11-19 PROCEDURE — 87086 URINE CULTURE/COLONY COUNT: CPT

## 2018-11-19 PROCEDURE — 84484 ASSAY OF TROPONIN QUANT: CPT

## 2018-11-19 RX ORDER — 0.9 % SODIUM CHLORIDE 0.9 %
1000 INTRAVENOUS SOLUTION INTRAVENOUS ONCE
Status: COMPLETED | OUTPATIENT
Start: 2018-11-19 | End: 2018-11-19

## 2018-11-19 RX ORDER — ONDANSETRON 2 MG/ML
4 INJECTION INTRAMUSCULAR; INTRAVENOUS ONCE
Status: COMPLETED | OUTPATIENT
Start: 2018-11-19 | End: 2018-11-19

## 2018-11-19 RX ADMIN — SODIUM CHLORIDE 1000 ML: 9 INJECTION, SOLUTION INTRAVENOUS at 15:27

## 2018-11-19 RX ADMIN — ONDANSETRON 4 MG: 2 INJECTION INTRAMUSCULAR; INTRAVENOUS at 15:27

## 2018-11-19 ASSESSMENT — ENCOUNTER SYMPTOMS
NAUSEA: 1
COUGH: 1
SHORTNESS OF BREATH: 0
VOMITING: 0
ABDOMINAL PAIN: 0
DIARRHEA: 0

## 2018-11-19 ASSESSMENT — PAIN SCALES - GENERAL: PAINLEVEL_OUTOF10: 7

## 2018-11-19 ASSESSMENT — PAIN DESCRIPTION - DESCRIPTORS: DESCRIPTORS: ACHING

## 2018-11-19 ASSESSMENT — PAIN DESCRIPTION - PAIN TYPE: TYPE: ACUTE PAIN

## 2018-11-19 ASSESSMENT — PAIN DESCRIPTION - FREQUENCY: FREQUENCY: CONTINUOUS

## 2018-11-19 ASSESSMENT — PAIN DESCRIPTION - PROGRESSION: CLINICAL_PROGRESSION: GRADUALLY WORSENING

## 2018-11-19 NOTE — ED PROVIDER NOTES
mother, and sister; Cancer in her mother; Diabetes in her mother; Heart Disease in her mother; High Blood Pressure in her brother; Other in her sister. SOCIAL HISTORY   reports that she quit smoking about 5 years ago. Her smoking use included Cigarettes. She has a 30.00 pack-year smoking history. She has never used smokeless tobacco. She reports that she does not drink alcohol or use drugs. HOME MEDICATIONS     Prior to Admission medications    Medication Sig Start Date End Date Taking? Authorizing Provider   HYDROcodone-acetaminophen (NORCO) 5-325 MG per tablet Take 0.5 tablets by mouth 2 times daily for 30 days. . 11/15/18 12/15/18  BRANDY Ventura CNP   rizatriptan (MAXALT) 10 MG tablet TAKE ONE TABLET BY MOUTH AT ONSET OF HEADACHE; MAY REPEAT ONE TABLET IN 2 HOURS IF NEEDED. MAX OF 2 TABLETS PER DAY 10/24/18   BRANDY Ventura CNP   mirtazapine (REMERON) 7.5 MG tablet TAKE ONE TABLET BY MOUTH ONCE NIGHTLY 8/22/18   Rashaad Nascimento MD   dicyclomine (BENTYL) 10 MG capsule TAKE ONE CAPSULE BY MOUTH THREE TIMES A DAY AS NEEDED FOR BOWEL SYMPTOMS 8/22/18   Rashaad Nascimento MD   donepezil (ARICEPT) 10 MG tablet TAKE ONE TABLET BY MOUTH ONCE NIGHTLY 8/22/18   Rashaad Nascimento MD   valproic acid (DEPAKENE) 250 MG capsule TAKE ONE CAPSULE BY MOUTH TWICE A DAY 8/22/18   Rashaad Nascimento MD   montelukast (SINGULAIR) 10 MG tablet TAKE ONE TABLET BY MOUTH DAILY 8/22/18   Rashaad Nascimento MD   furosemide (LASIX) 20 MG tablet One daily prn edema up to 2 times a week.  4/17/18   Rashaad Nascimento MD   Multiple Vitamins-Minerals (MULTIVITAMIN WITH MINERALS) tablet Take 1 tablet by mouth daily 8/21/17   Rashaad Nascimento MD   aspirin EC 81 MG EC tablet Take 1 tablet by mouth daily 6/13/16   Rashaad Nascimento MD   Cholecalciferol (VITAMIN D-3) 1000 UNITS CAPS Take 3,000 Units by mouth daily 6/13/16   Rashaad Nascimento MD        ALLERGIES  is allergic to avandia [rosiglitazone maleate]; codeine; glucophage

## 2018-11-19 NOTE — ED PROVIDER NOTES
Negative Negative    Rapid Influenza B Ag Negative Negative   Urine Culture   Result Value Ref Range    Urine Culture, Routine No growth at 18-36 hours    Lactic Acid, Plasma   Result Value Ref Range    Lactic Acid 2.0 0.4 - 2.0 mmol/L   CBC Auto Differential   Result Value Ref Range    WBC 3.9 (L) 4.0 - 11.0 K/uL    RBC 4.56 4.00 - 5.20 M/uL    Hemoglobin 14.1 12.0 - 16.0 g/dL    Hematocrit 43.0 36.0 - 48.0 %    MCV 94.3 80.0 - 100.0 fL    MCH 30.9 26.0 - 34.0 pg    MCHC 32.7 31.0 - 36.0 g/dL    RDW 14.6 12.4 - 15.4 %    Platelets 570 437 - 698 K/uL    MPV 7.5 5.0 - 10.5 fL    Neutrophils % 50.0 %    Lymphocytes % 35.5 %    Monocytes % 10.1 %    Eosinophils % 3.9 %    Basophils % 0.5 %    Neutrophils # 1.9 1.7 - 7.7 K/uL    Lymphocytes # 1.4 1.0 - 5.1 K/uL    Monocytes # 0.4 0.0 - 1.3 K/uL    Eosinophils # 0.2 0.0 - 0.6 K/uL    Basophils # 0.0 0.0 - 0.2 K/uL   Comprehensive Metabolic Panel w/ Reflex to MG   Result Value Ref Range    Sodium 143 136 - 145 mmol/L    Potassium reflex Magnesium 4.2 3.5 - 5.1 mmol/L    Chloride 106 99 - 110 mmol/L    CO2 28 21 - 32 mmol/L    Anion Gap 9 3 - 16    Glucose 120 (H) 70 - 99 mg/dL    BUN 12 7 - 20 mg/dL    CREATININE 0.6 0.6 - 1.2 mg/dL    GFR Non-African American >60 >60    GFR African American >60 >60    Calcium 9.7 8.3 - 10.6 mg/dL    Total Protein 6.5 6.4 - 8.2 g/dL    Alb 3.5 3.4 - 5.0 g/dL    Albumin/Globulin Ratio 1.2 1.1 - 2.2    Total Bilirubin 0.3 0.0 - 1.0 mg/dL    Alkaline Phosphatase 75 40 - 129 U/L    ALT 31 10 - 40 U/L    AST 35 15 - 37 U/L    Globulin 3.0 g/dL   Urinalysis Reflex to Culture   Result Value Ref Range    Color, UA Yellow Straw/Yellow    Clarity, UA Clear Clear    Glucose, Ur Negative Negative mg/dL    Bilirubin Urine Negative Negative    Ketones, Urine Negative Negative mg/dL    Specific Gravity, UA 1.015 1.005 - 1.030    Blood, Urine Negative Negative    pH, UA 7.5 5.0 - 8.0    Protein, UA Negative Negative mg/dL    Urobilinogen, Urine 0.2 <2.0

## 2018-11-20 LAB
EKG ATRIAL RATE: 44 BPM
EKG DIAGNOSIS: NORMAL
EKG P AXIS: 69 DEGREES
EKG P-R INTERVAL: 172 MS
EKG Q-T INTERVAL: 466 MS
EKG QRS DURATION: 74 MS
EKG QTC CALCULATION (BAZETT): 398 MS
EKG R AXIS: 79 DEGREES
EKG T AXIS: 57 DEGREES
EKG VENTRICULAR RATE: 44 BPM

## 2018-11-21 LAB — URINE CULTURE, ROUTINE: NORMAL

## 2018-11-25 LAB
BLOOD CULTURE, ROUTINE: NORMAL
CULTURE, BLOOD 2: NORMAL

## 2018-11-26 ENCOUNTER — TELEPHONE (OUTPATIENT)
Dept: FAMILY MEDICINE CLINIC | Age: 78
End: 2018-11-26

## 2018-11-26 NOTE — TELEPHONE ENCOUNTER
Dicyclomine 10 and 20 mg's are both on long term back order, is there something else you can change to?

## 2018-11-27 RX ORDER — MONTELUKAST SODIUM 10 MG/1
TABLET ORAL
Qty: 30 TABLET | Refills: 5 | Status: SHIPPED | OUTPATIENT
Start: 2018-11-27 | End: 2019-06-22 | Stop reason: SDUPTHER

## 2018-11-27 RX ORDER — VALPROIC ACID 250 MG/1
CAPSULE, LIQUID FILLED ORAL
Qty: 60 CAPSULE | Refills: 5 | Status: SHIPPED | OUTPATIENT
Start: 2018-11-27 | End: 2019-05-14 | Stop reason: ALTCHOICE

## 2018-11-27 RX ORDER — MIRTAZAPINE 7.5 MG/1
TABLET, FILM COATED ORAL
Qty: 30 TABLET | Refills: 5 | Status: SHIPPED | OUTPATIENT
Start: 2018-11-27 | End: 2019-05-14 | Stop reason: ALTCHOICE

## 2018-11-27 RX ORDER — DONEPEZIL HYDROCHLORIDE 10 MG/1
TABLET, FILM COATED ORAL
Qty: 30 TABLET | Refills: 5 | Status: SHIPPED | OUTPATIENT
Start: 2018-11-27 | End: 2019-05-14 | Stop reason: ALTCHOICE

## 2018-11-30 ENCOUNTER — APPOINTMENT (OUTPATIENT)
Dept: CT IMAGING | Age: 78
End: 2018-11-30
Payer: MEDICARE

## 2018-11-30 ENCOUNTER — HOSPITAL ENCOUNTER (EMERGENCY)
Age: 78
Discharge: HOME | End: 2018-11-30
Attending: EMERGENCY MEDICINE
Payer: MEDICARE

## 2018-11-30 ENCOUNTER — APPOINTMENT (OUTPATIENT)
Dept: GENERAL RADIOLOGY | Age: 78
End: 2018-11-30
Payer: MEDICARE

## 2018-11-30 VITALS
OXYGEN SATURATION: 97 % | DIASTOLIC BLOOD PRESSURE: 72 MMHG | WEIGHT: 165 LBS | HEART RATE: 80 BPM | SYSTOLIC BLOOD PRESSURE: 134 MMHG | BODY MASS INDEX: 25.9 KG/M2 | TEMPERATURE: 98 F | RESPIRATION RATE: 18 BRPM | HEIGHT: 67 IN
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
## 2018-11-30 PROCEDURE — 72131 CT LUMBAR SPINE W/O DYE: CPT

## 2018-11-30 PROCEDURE — 72192 CT PELVIS W/O DYE: CPT

## 2018-11-30 PROCEDURE — 72125 CT NECK SPINE W/O DYE: CPT

## 2018-11-30 PROCEDURE — 70450 CT HEAD/BRAIN W/O DYE: CPT

## 2018-11-30 PROCEDURE — 99285 EMERGENCY DEPT VISIT HI MDM: CPT

## 2018-11-30 PROCEDURE — 71101 X-RAY EXAM UNILAT RIBS/CHEST: CPT

## 2018-11-30 ASSESSMENT — PAIN SCALES - GENERAL: PAINLEVEL_OUTOF10: 6

## 2018-11-30 ASSESSMENT — PAIN DESCRIPTION - DESCRIPTORS: DESCRIPTORS: ACHING

## 2018-11-30 ASSESSMENT — PAIN DESCRIPTION - LOCATION: LOCATION: BACK

## 2018-11-30 ASSESSMENT — ENCOUNTER SYMPTOMS
COUGH: 0
SHORTNESS OF BREATH: 0
DIARRHEA: 0
NAUSEA: 0
VOMITING: 0
ABDOMINAL PAIN: 0
SORE THROAT: 0

## 2018-11-30 ASSESSMENT — PAIN DESCRIPTION - ORIENTATION: ORIENTATION: LOWER

## 2018-11-30 NOTE — ED NOTES
Pt discharged with [facility]. No new rx. Voices understanding of medication use at home and follow up. Denies any questions.      Syl Finney RN  11/30/18 [id number]

## 2018-11-30 NOTE — ED NOTES
Pt assisted on to bedpan. Pt able to urinate without difficulty.       Kandy Tsai, RN  11/30/18 [id number]

## 2018-11-30 NOTE — ED PROVIDER NOTES
Her smoking use included Cigarettes. She has a 30.00 pack-year smoking history. She has never used smokeless tobacco. She reports that she does not drink alcohol or use drugs. HOME MEDICATIONS     Prior to Admission medications    Medication Sig Start Date End Date Taking? Authorizing Provider   valproic acid (DEPAKENE) 250 MG capsule TAKE ONE CAPSULE BY MOUTH TWICE A DAY 11/27/18   BRANDY Duran - CNP   mirtazapine (REMERON) 7.5 MG tablet TAKE ONE TABLET BY MOUTH ONCE NIGHTLY 11/27/18   Kervin Saleem, BRANDY - CNP   montelukast (SINGULAIR) 10 MG tablet TAKE ONE TABLET BY MOUTH DAILY 11/27/18   Kervin Saleem, BRANDY - CNP   donepezil (ARICEPT) 10 MG tablet TAKE ONE TABLET BY MOUTH ONCE NIGHTLY 11/27/18   BRANDY Duran - CNP   HYDROcodone-acetaminophen (NORCO) 5-325 MG per tablet Take 0.5 tablets by mouth 2 times daily for 30 days. . 11/15/18 12/15/18  BRANDY Duran - CNP   rizatriptan (MAXALT) 10 MG tablet TAKE ONE TABLET BY MOUTH AT ONSET OF HEADACHE; MAY REPEAT ONE TABLET IN 2 HOURS IF NEEDED. MAX OF 2 TABLETS PER DAY 10/24/18   BRANDY Duran CNP   dicyclomine (BENTYL) 10 MG capsule TAKE ONE CAPSULE BY MOUTH THREE TIMES A DAY AS NEEDED FOR BOWEL SYMPTOMS 8/22/18   Shefali Velasquez MD   furosemide (LASIX) 20 MG tablet One daily prn edema up to 2 times a week. 4/17/18   Shefali Velasquez MD   Multiple Vitamins-Minerals (MULTIVITAMIN WITH MINERALS) tablet Take 1 tablet by mouth daily 8/21/17   Shefali Velasquez MD   aspirin EC 81 MG EC tablet Take 1 tablet by mouth daily 6/13/16   Shefali Velasquez MD   Cholecalciferol (VITAMIN D-3) 1000 UNITS CAPS Take 3,000 Units by mouth daily 6/13/16   Shefali Velasquez MD        ALLERGIES  is allergic to avandia [rosiglitazone maleate]; codeine; glucophage [metformin hydrochloride]; hydrochlorothiazide; and tramadol.       /72   Pulse 80   Temp 98 °F (36.7 °C)   Resp 18   Ht 5' 7\" (1.702 m)   Wt 165 lb (74.8 kg)   SpO2 97%   BMI 25.84 home they understand that should she have pain change in mental status or any other problems they will return to the emergency department. Family is agreeable to plan she is discharged good condition awake alert without complaint. 4:27 PM:  Discussed results, diagnosis and plan with patient and/or family. Questions addressed. Disposition and follow-up agreed upon. Specific discharge instructions explained. The patient and/or family and I have discussed the diagnosis and risks, and we agree with discharging home to follow-up with their primary care, specialist or referral doctor. We also discussed returning to the Emergency Department immediately if new or worsening symptoms occur. We have discussed the symptoms which are most concerning that necessitate immediate return. This document serves as a record of the services and decisions personally performed by Anabela Washington MD. It was created on the provider's behalf by Meaghan Walker, a trained medical scribe. The creation of this document is based on the provider's statements to the medical scribe. The document has been reviewed and approved by the provider. Cori Cisneros, 2:09 PM 11/30/18 scribing for and in the presence of Anabela Washington MD.        This dictation was generated by voice recognition computer software. Although all attempts are made to edit the dictation for accuracy, there may be errors in the transcription that are not intended.      The Clinical Impression is ernie Washington MD  11/30/18 [id number]

## 2019-01-10 ENCOUNTER — OFFICE VISIT (OUTPATIENT)
Dept: FAMILY MEDICINE CLINIC | Age: 79
End: 2019-01-10
Payer: MEDICARE

## 2019-01-10 VITALS
OXYGEN SATURATION: 96 % | TEMPERATURE: 97.6 F | HEART RATE: 53 BPM | DIASTOLIC BLOOD PRESSURE: 78 MMHG | SYSTOLIC BLOOD PRESSURE: 120 MMHG | WEIGHT: 167 LBS | BODY MASS INDEX: 26.16 KG/M2

## 2019-01-10 DIAGNOSIS — J30.1 SEASONAL ALLERGIC RHINITIS DUE TO POLLEN: ICD-10-CM

## 2019-01-10 DIAGNOSIS — R00.1 BRADYCARDIA: ICD-10-CM

## 2019-01-10 DIAGNOSIS — I10 ESSENTIAL HYPERTENSION: ICD-10-CM

## 2019-01-10 DIAGNOSIS — M51.9 LUMBAR DISC DISEASE: ICD-10-CM

## 2019-01-10 DIAGNOSIS — F03.90 DEMENTIA WITHOUT BEHAVIORAL DISTURBANCE, UNSPECIFIED DEMENTIA TYPE: Primary | ICD-10-CM

## 2019-01-10 PROBLEM — M25.511 CHRONIC RIGHT SHOULDER PAIN: Status: RESOLVED | Noted: 2017-09-15 | Resolved: 2019-01-10

## 2019-01-10 PROBLEM — G89.29 CHRONIC RIGHT SHOULDER PAIN: Status: RESOLVED | Noted: 2017-09-15 | Resolved: 2019-01-10

## 2019-01-10 PROBLEM — J34.89 NASAL DISCHARGE: Status: RESOLVED | Noted: 2018-04-13 | Resolved: 2019-01-10

## 2019-01-10 PROCEDURE — 1101F PT FALLS ASSESS-DOCD LE1/YR: CPT | Performed by: NURSE PRACTITIONER

## 2019-01-10 PROCEDURE — 1090F PRES/ABSN URINE INCON ASSESS: CPT | Performed by: NURSE PRACTITIONER

## 2019-01-10 PROCEDURE — G8599 NO ASA/ANTIPLAT THER USE RNG: HCPCS | Performed by: NURSE PRACTITIONER

## 2019-01-10 PROCEDURE — 99214 OFFICE O/P EST MOD 30 MIN: CPT | Performed by: NURSE PRACTITIONER

## 2019-01-10 PROCEDURE — G8427 DOCREV CUR MEDS BY ELIG CLIN: HCPCS | Performed by: NURSE PRACTITIONER

## 2019-01-10 PROCEDURE — G8419 CALC BMI OUT NRM PARAM NOF/U: HCPCS | Performed by: NURSE PRACTITIONER

## 2019-01-10 PROCEDURE — G8484 FLU IMMUNIZE NO ADMIN: HCPCS | Performed by: NURSE PRACTITIONER

## 2019-01-10 PROCEDURE — 4040F PNEUMOC VAC/ADMIN/RCVD: CPT | Performed by: NURSE PRACTITIONER

## 2019-01-10 PROCEDURE — 1036F TOBACCO NON-USER: CPT | Performed by: NURSE PRACTITIONER

## 2019-01-10 PROCEDURE — G8399 PT W/DXA RESULTS DOCUMENT: HCPCS | Performed by: NURSE PRACTITIONER

## 2019-01-10 PROCEDURE — 1123F ACP DISCUSS/DSCN MKR DOCD: CPT | Performed by: NURSE PRACTITIONER

## 2019-01-10 RX ORDER — HYDROCODONE BITARTRATE AND ACETAMINOPHEN 5; 325 MG/1; MG/1
0.5 TABLET ORAL 2 TIMES DAILY
Qty: 30 TABLET | Refills: 0 | Status: SHIPPED | OUTPATIENT
Start: 2019-01-10 | End: 2019-02-22 | Stop reason: SDUPTHER

## 2019-01-10 RX ORDER — CETIRIZINE HYDROCHLORIDE 5 MG/1
5 TABLET, CHEWABLE ORAL DAILY
Qty: 30 TABLET | Refills: 0 | Status: SHIPPED | OUTPATIENT
Start: 2019-01-10 | End: 2019-05-14 | Stop reason: ALTCHOICE

## 2019-01-29 ENCOUNTER — TELEPHONE (OUTPATIENT)
Dept: FAMILY MEDICINE CLINIC | Age: 79
End: 2019-01-29

## 2019-02-05 RX ORDER — DICYCLOMINE HYDROCHLORIDE 10 MG/1
CAPSULE ORAL
Qty: 120 CAPSULE | Refills: 1 | Status: SHIPPED | OUTPATIENT
Start: 2019-02-05 | End: 2019-04-17 | Stop reason: SDUPTHER

## 2019-02-09 ENCOUNTER — APPOINTMENT (OUTPATIENT)
Dept: GENERAL RADIOLOGY | Age: 79
End: 2019-02-09
Payer: MEDICARE

## 2019-02-09 ENCOUNTER — HOSPITAL ENCOUNTER (EMERGENCY)
Age: 79
Discharge: HOME | End: 2019-02-09
Attending: EMERGENCY MEDICINE
Payer: MEDICARE

## 2019-02-09 VITALS — HEIGHT: 64 IN | BODY MASS INDEX: 29.02 KG/M2 | WEIGHT: 170 LBS

## 2019-02-09 DIAGNOSIS — J20.9 ACUTE BRONCHITIS, UNSPECIFIED ORGANISM: Primary | ICD-10-CM

## 2019-02-09 DIAGNOSIS — F41.1 ANXIETY STATE: ICD-10-CM

## 2019-02-09 LAB
A/G RATIO: 1.2 (ref 1.1–2.2)
ALBUMIN SERPL-MCNC: 3.5 G/DL (ref 3.4–5)
ALP BLD-CCNC: 55 U/L (ref 40–129)
ALT SERPL-CCNC: 15 U/L (ref 10–40)
ANION GAP SERPL CALCULATED.3IONS-SCNC: 11 MMOL/L (ref 3–16)
AST SERPL-CCNC: 16 U/L (ref 15–37)
BACTERIA: ABNORMAL /HPF
BASOPHILS ABSOLUTE: 0 K/UL (ref 0–0.2)
BASOPHILS RELATIVE PERCENT: 0.4 %
BILIRUB SERPL-MCNC: 0.6 MG/DL (ref 0–1)
BILIRUBIN URINE: NEGATIVE
BLOOD, URINE: NEGATIVE
BUN BLDV-MCNC: 17 MG/DL (ref 7–20)
CALCIUM SERPL-MCNC: 9.4 MG/DL (ref 8.3–10.6)
CHLORIDE BLD-SCNC: 103 MMOL/L (ref 99–110)
CLARITY: CLEAR
CO2: 29 MMOL/L (ref 21–32)
COLOR: YELLOW
CREAT SERPL-MCNC: 0.7 MG/DL (ref 0.6–1.2)
EOSINOPHILS ABSOLUTE: 0.1 K/UL (ref 0–0.6)
EOSINOPHILS RELATIVE PERCENT: 2.1 %
EPITHELIAL CELLS, UA: ABNORMAL /HPF
GFR AFRICAN AMERICAN: >60
GFR NON-AFRICAN AMERICAN: >60
GLOBULIN: 2.9 G/DL
GLUCOSE BLD-MCNC: 189 MG/DL (ref 70–99)
GLUCOSE URINE: NEGATIVE MG/DL
HCT VFR BLD CALC: 41.2 % (ref 36–48)
HEMOGLOBIN: 13.6 G/DL (ref 12–16)
KETONES, URINE: NEGATIVE MG/DL
LEUKOCYTE ESTERASE, URINE: ABNORMAL
LYMPHOCYTES ABSOLUTE: 1.5 K/UL (ref 1–5.1)
LYMPHOCYTES RELATIVE PERCENT: 24.5 %
MCH RBC QN AUTO: 31.2 PG (ref 26–34)
MCHC RBC AUTO-ENTMCNC: 33.1 G/DL (ref 31–36)
MCV RBC AUTO: 94.3 FL (ref 80–100)
MICROSCOPIC EXAMINATION: YES
MONOCYTES ABSOLUTE: 0.6 K/UL (ref 0–1.3)
MONOCYTES RELATIVE PERCENT: 9.1 %
NEUTROPHILS ABSOLUTE: 3.9 K/UL (ref 1.7–7.7)
NEUTROPHILS RELATIVE PERCENT: 63.9 %
NITRITE, URINE: NEGATIVE
PDW BLD-RTO: 14.6 % (ref 12.4–15.4)
PH UA: 7
PLATELET # BLD: 142 K/UL (ref 135–450)
PMV BLD AUTO: 7.9 FL (ref 5–10.5)
POTASSIUM SERPL-SCNC: 4 MMOL/L (ref 3.5–5.1)
PROTEIN UA: NEGATIVE MG/DL
RBC # BLD: 4.37 M/UL (ref 4–5.2)
RBC UA: ABNORMAL /HPF (ref 0–2)
SODIUM BLD-SCNC: 143 MMOL/L (ref 136–145)
SPECIFIC GRAVITY UA: <=1.005
TOTAL PROTEIN: 6.4 G/DL (ref 6.4–8.2)
URINE TYPE: ABNORMAL
UROBILINOGEN, URINE: 0.2 E.U./DL
WBC # BLD: 6.2 K/UL (ref 4–11)
WBC UA: ABNORMAL /HPF (ref 0–5)

## 2019-02-09 PROCEDURE — 85025 COMPLETE CBC W/AUTO DIFF WBC: CPT

## 2019-02-09 PROCEDURE — 71045 X-RAY EXAM CHEST 1 VIEW: CPT

## 2019-02-09 PROCEDURE — 81001 URINALYSIS AUTO W/SCOPE: CPT

## 2019-02-09 PROCEDURE — 6370000000 HC RX 637 (ALT 250 FOR IP): Performed by: EMERGENCY MEDICINE

## 2019-02-09 PROCEDURE — 93005 ELECTROCARDIOGRAM TRACING: CPT | Performed by: EMERGENCY MEDICINE

## 2019-02-09 PROCEDURE — 99284 EMERGENCY DEPT VISIT MOD MDM: CPT

## 2019-02-09 PROCEDURE — 36415 COLL VENOUS BLD VENIPUNCTURE: CPT

## 2019-02-09 PROCEDURE — 80053 COMPREHEN METABOLIC PANEL: CPT

## 2019-02-09 RX ORDER — GUAIFENESIN 600 MG/1
600 TABLET, EXTENDED RELEASE ORAL 2 TIMES DAILY PRN
Qty: 10 TABLET | Refills: 0 | Status: SHIPPED | OUTPATIENT
Start: 2019-02-09 | End: 2019-02-14

## 2019-02-09 RX ORDER — PREDNISONE 20 MG/1
60 TABLET ORAL ONCE
Status: COMPLETED | OUTPATIENT
Start: 2019-02-09 | End: 2019-02-09

## 2019-02-09 RX ORDER — PREDNISONE 20 MG/1
40 TABLET ORAL DAILY
Qty: 10 TABLET | Refills: 0 | Status: SHIPPED | OUTPATIENT
Start: 2019-02-09 | End: 2019-02-14

## 2019-02-09 RX ADMIN — PREDNISONE 60 MG: 20 TABLET ORAL at 16:29

## 2019-02-10 LAB
EKG ATRIAL RATE: 79 BPM
EKG DIAGNOSIS: NORMAL
EKG P AXIS: 48 DEGREES
EKG P-R INTERVAL: 170 MS
EKG Q-T INTERVAL: 394 MS
EKG QRS DURATION: 74 MS
EKG QTC CALCULATION (BAZETT): 451 MS
EKG R AXIS: 68 DEGREES
EKG T AXIS: 44 DEGREES
EKG VENTRICULAR RATE: 79 BPM

## 2019-02-10 PROCEDURE — 93010 ELECTROCARDIOGRAM REPORT: CPT | Performed by: INTERNAL MEDICINE

## 2019-02-22 DIAGNOSIS — M51.9 LUMBAR DISC DISEASE: ICD-10-CM

## 2019-02-22 RX ORDER — HYDROCODONE BITARTRATE AND ACETAMINOPHEN 5; 325 MG/1; MG/1
0.5 TABLET ORAL 2 TIMES DAILY
Qty: 30 TABLET | Refills: 0 | Status: SHIPPED | OUTPATIENT
Start: 2019-02-22 | End: 2019-03-29 | Stop reason: SDUPTHER

## 2019-02-26 ENCOUNTER — OFFICE VISIT (OUTPATIENT)
Dept: FAMILY MEDICINE CLINIC | Age: 79
End: 2019-02-26
Payer: MEDICARE

## 2019-02-26 VITALS
HEART RATE: 72 BPM | DIASTOLIC BLOOD PRESSURE: 74 MMHG | BODY MASS INDEX: 28.54 KG/M2 | SYSTOLIC BLOOD PRESSURE: 112 MMHG | TEMPERATURE: 97.8 F | OXYGEN SATURATION: 95 % | WEIGHT: 166.25 LBS

## 2019-02-26 DIAGNOSIS — J40 BRONCHITIS: Primary | ICD-10-CM

## 2019-02-26 DIAGNOSIS — J01.40 ACUTE NON-RECURRENT PANSINUSITIS: ICD-10-CM

## 2019-02-26 DIAGNOSIS — E11.9 DIET-CONTROLLED DIABETES MELLITUS (HCC): ICD-10-CM

## 2019-02-26 LAB — HBA1C MFR BLD: 6.4 %

## 2019-02-26 PROCEDURE — 1123F ACP DISCUSS/DSCN MKR DOCD: CPT | Performed by: NURSE PRACTITIONER

## 2019-02-26 PROCEDURE — G8484 FLU IMMUNIZE NO ADMIN: HCPCS | Performed by: NURSE PRACTITIONER

## 2019-02-26 PROCEDURE — G8427 DOCREV CUR MEDS BY ELIG CLIN: HCPCS | Performed by: NURSE PRACTITIONER

## 2019-02-26 PROCEDURE — G8419 CALC BMI OUT NRM PARAM NOF/U: HCPCS | Performed by: NURSE PRACTITIONER

## 2019-02-26 PROCEDURE — 4040F PNEUMOC VAC/ADMIN/RCVD: CPT | Performed by: NURSE PRACTITIONER

## 2019-02-26 PROCEDURE — 1090F PRES/ABSN URINE INCON ASSESS: CPT | Performed by: NURSE PRACTITIONER

## 2019-02-26 PROCEDURE — 1101F PT FALLS ASSESS-DOCD LE1/YR: CPT | Performed by: NURSE PRACTITIONER

## 2019-02-26 PROCEDURE — 83036 HEMOGLOBIN GLYCOSYLATED A1C: CPT | Performed by: NURSE PRACTITIONER

## 2019-02-26 PROCEDURE — 1036F TOBACCO NON-USER: CPT | Performed by: NURSE PRACTITIONER

## 2019-02-26 PROCEDURE — G8399 PT W/DXA RESULTS DOCUMENT: HCPCS | Performed by: NURSE PRACTITIONER

## 2019-02-26 PROCEDURE — G8599 NO ASA/ANTIPLAT THER USE RNG: HCPCS | Performed by: NURSE PRACTITIONER

## 2019-02-26 PROCEDURE — 99213 OFFICE O/P EST LOW 20 MIN: CPT | Performed by: NURSE PRACTITIONER

## 2019-02-26 RX ORDER — DOXYCYCLINE HYCLATE 100 MG
100 TABLET ORAL 2 TIMES DAILY
Qty: 20 TABLET | Refills: 0 | Status: SHIPPED | OUTPATIENT
Start: 2019-02-26 | End: 2019-06-28 | Stop reason: SDUPTHER

## 2019-02-26 ASSESSMENT — ENCOUNTER SYMPTOMS
SINUS PRESSURE: 1
GASTROINTESTINAL NEGATIVE: 1
SINUS PAIN: 1
COUGH: 1
BACK PAIN: 1
EYES NEGATIVE: 1

## 2019-03-13 ENCOUNTER — OFFICE VISIT (OUTPATIENT)
Dept: CARDIOLOGY CLINIC | Age: 79
End: 2019-03-13
Payer: MEDICARE

## 2019-03-13 VITALS
OXYGEN SATURATION: 98 % | SYSTOLIC BLOOD PRESSURE: 110 MMHG | WEIGHT: 169 LBS | HEART RATE: 51 BPM | HEIGHT: 64 IN | DIASTOLIC BLOOD PRESSURE: 70 MMHG | BODY MASS INDEX: 28.85 KG/M2

## 2019-03-13 DIAGNOSIS — F03.90 DEMENTIA WITHOUT BEHAVIORAL DISTURBANCE, UNSPECIFIED DEMENTIA TYPE: ICD-10-CM

## 2019-03-13 DIAGNOSIS — R00.1 BRADYCARDIA: Primary | ICD-10-CM

## 2019-03-13 DIAGNOSIS — R06.02 SOB (SHORTNESS OF BREATH): ICD-10-CM

## 2019-03-13 PROCEDURE — 99213 OFFICE O/P EST LOW 20 MIN: CPT | Performed by: INTERNAL MEDICINE

## 2019-03-13 PROCEDURE — G8419 CALC BMI OUT NRM PARAM NOF/U: HCPCS | Performed by: INTERNAL MEDICINE

## 2019-03-13 PROCEDURE — 1123F ACP DISCUSS/DSCN MKR DOCD: CPT | Performed by: INTERNAL MEDICINE

## 2019-03-13 PROCEDURE — 1101F PT FALLS ASSESS-DOCD LE1/YR: CPT | Performed by: INTERNAL MEDICINE

## 2019-03-13 PROCEDURE — G8484 FLU IMMUNIZE NO ADMIN: HCPCS | Performed by: INTERNAL MEDICINE

## 2019-03-13 PROCEDURE — G8598 ASA/ANTIPLAT THER USED: HCPCS | Performed by: INTERNAL MEDICINE

## 2019-03-13 PROCEDURE — 4040F PNEUMOC VAC/ADMIN/RCVD: CPT | Performed by: INTERNAL MEDICINE

## 2019-03-13 PROCEDURE — 1036F TOBACCO NON-USER: CPT | Performed by: INTERNAL MEDICINE

## 2019-03-13 PROCEDURE — 1090F PRES/ABSN URINE INCON ASSESS: CPT | Performed by: INTERNAL MEDICINE

## 2019-03-13 PROCEDURE — G8427 DOCREV CUR MEDS BY ELIG CLIN: HCPCS | Performed by: INTERNAL MEDICINE

## 2019-03-13 PROCEDURE — G8399 PT W/DXA RESULTS DOCUMENT: HCPCS | Performed by: INTERNAL MEDICINE

## 2019-03-13 RX ORDER — GUAIFENESIN 600 MG/1
1200 TABLET, EXTENDED RELEASE ORAL 2 TIMES DAILY PRN
COMMUNITY
End: 2019-05-14

## 2019-03-25 PROCEDURE — 93224 XTRNL ECG REC UP TO 48 HRS: CPT | Performed by: INTERNAL MEDICINE

## 2019-03-26 ENCOUNTER — TELEPHONE (OUTPATIENT)
Dept: CARDIOLOGY CLINIC | Age: 79
End: 2019-03-26

## 2019-03-28 DIAGNOSIS — R00.1 BRADYCARDIA: ICD-10-CM

## 2019-03-29 DIAGNOSIS — M51.9 LUMBAR DISC DISEASE: ICD-10-CM

## 2019-03-29 RX ORDER — HYDROCODONE BITARTRATE AND ACETAMINOPHEN 5; 325 MG/1; MG/1
0.5 TABLET ORAL 2 TIMES DAILY
Qty: 30 TABLET | Refills: 0 | Status: SHIPPED | OUTPATIENT
Start: 2019-03-29 | End: 2019-05-14

## 2019-03-29 NOTE — TELEPHONE ENCOUNTER
Date of last refill of this med was 2-22-19 Q, # of pills given 30 and # of refills given 0. Their next appointment is 5-14-19, the last date patient was seen was 1-10-19. Does patient have medication agreement on file? No  Has drug screen been done in last 12 months if needed? no  Has [place] report been ran in last 90 days? Yes  3-29-19 Please review   I will have patient sign med contract today.

## 2019-04-11 ENCOUNTER — HOSPITAL ENCOUNTER (OUTPATIENT)
Dept: CARDIOLOGY | Age: 79
Discharge: HOME | End: 2019-04-11
Payer: MEDICARE

## 2019-04-11 DIAGNOSIS — R00.1 BRADYCARDIA: ICD-10-CM

## 2019-04-11 DIAGNOSIS — R06.02 SOB (SHORTNESS OF BREATH): ICD-10-CM

## 2019-04-11 LAB
LV EF: 63 %
LVEF MODALITY: NORMAL

## 2019-04-11 PROCEDURE — 93306 TTE W/DOPPLER COMPLETE: CPT

## 2019-04-15 ENCOUNTER — TELEPHONE (OUTPATIENT)
Dept: CARDIOLOGY CLINIC | Age: 79
End: 2019-04-15

## 2019-04-15 NOTE — TELEPHONE ENCOUNTER
Spoke with Jadyn Rodrigues, who is on HIPAA form. I relayed echo results per JJP. Karri verbalized understanding and will relay message to Marilyn Tucker.

## 2019-04-15 NOTE — TELEPHONE ENCOUNTER
----- Message from Clark Orozco MD sent at 4/12/2019  5:34 PM EDT -----  Let pt know that echo was ok, only small findings of atrial dilation, diastolic dysfunction noted.

## 2019-04-17 RX ORDER — DICYCLOMINE HYDROCHLORIDE 10 MG/1
CAPSULE ORAL
Qty: 120 CAPSULE | Refills: 3 | Status: SHIPPED | OUTPATIENT
Start: 2019-04-17 | End: 2019-05-14 | Stop reason: ALTCHOICE

## 2019-05-14 ENCOUNTER — OFFICE VISIT (OUTPATIENT)
Dept: FAMILY MEDICINE CLINIC | Age: 79
End: 2019-05-14
Payer: MEDICARE

## 2019-05-14 VITALS
TEMPERATURE: 97.4 F | WEIGHT: 174.38 LBS | BODY MASS INDEX: 29.93 KG/M2 | DIASTOLIC BLOOD PRESSURE: 76 MMHG | SYSTOLIC BLOOD PRESSURE: 122 MMHG | HEART RATE: 64 BPM | OXYGEN SATURATION: 94 %

## 2019-05-14 DIAGNOSIS — K59.00 CONSTIPATION, UNSPECIFIED CONSTIPATION TYPE: ICD-10-CM

## 2019-05-14 DIAGNOSIS — E11.9 DIET-CONTROLLED DIABETES MELLITUS (HCC): ICD-10-CM

## 2019-05-14 DIAGNOSIS — M51.9 LUMBAR DISC DISEASE: ICD-10-CM

## 2019-05-14 DIAGNOSIS — J43.1 PANLOBULAR EMPHYSEMA (HCC): ICD-10-CM

## 2019-05-14 DIAGNOSIS — F51.01 PRIMARY INSOMNIA: ICD-10-CM

## 2019-05-14 DIAGNOSIS — R35.0 URINARY FREQUENCY: ICD-10-CM

## 2019-05-14 DIAGNOSIS — E78.00 HYPERCHOLESTEREMIA: Primary | ICD-10-CM

## 2019-05-14 LAB
BILIRUBIN, POC: ABNORMAL
BLOOD URINE, POC: ABNORMAL
CLARITY, POC: CLEAR
COLOR, POC: YELLOW
GLUCOSE URINE, POC: ABNORMAL
KETONES, POC: ABNORMAL
LEUKOCYTE EST, POC: ABNORMAL
NITRITE, POC: ABNORMAL
PH, POC: 7
PROTEIN, POC: ABNORMAL
SPECIFIC GRAVITY, POC: 1.01
UROBILINOGEN, POC: 1

## 2019-05-14 PROCEDURE — G8399 PT W/DXA RESULTS DOCUMENT: HCPCS | Performed by: NURSE PRACTITIONER

## 2019-05-14 PROCEDURE — G8427 DOCREV CUR MEDS BY ELIG CLIN: HCPCS | Performed by: NURSE PRACTITIONER

## 2019-05-14 PROCEDURE — 36415 COLL VENOUS BLD VENIPUNCTURE: CPT | Performed by: NURSE PRACTITIONER

## 2019-05-14 PROCEDURE — 4040F PNEUMOC VAC/ADMIN/RCVD: CPT | Performed by: NURSE PRACTITIONER

## 2019-05-14 PROCEDURE — 1123F ACP DISCUSS/DSCN MKR DOCD: CPT | Performed by: NURSE PRACTITIONER

## 2019-05-14 PROCEDURE — G8598 ASA/ANTIPLAT THER USED: HCPCS | Performed by: NURSE PRACTITIONER

## 2019-05-14 PROCEDURE — G8926 SPIRO NO PERF OR DOC: HCPCS | Performed by: NURSE PRACTITIONER

## 2019-05-14 PROCEDURE — 99214 OFFICE O/P EST MOD 30 MIN: CPT | Performed by: NURSE PRACTITIONER

## 2019-05-14 PROCEDURE — 81003 URINALYSIS AUTO W/O SCOPE: CPT | Performed by: NURSE PRACTITIONER

## 2019-05-14 PROCEDURE — 1036F TOBACCO NON-USER: CPT | Performed by: NURSE PRACTITIONER

## 2019-05-14 PROCEDURE — G8419 CALC BMI OUT NRM PARAM NOF/U: HCPCS | Performed by: NURSE PRACTITIONER

## 2019-05-14 PROCEDURE — 1090F PRES/ABSN URINE INCON ASSESS: CPT | Performed by: NURSE PRACTITIONER

## 2019-05-14 PROCEDURE — 3023F SPIROM DOC REV: CPT | Performed by: NURSE PRACTITIONER

## 2019-05-14 RX ORDER — POLYETHYLENE GLYCOL 3350 17 G/17G
17 POWDER, FOR SOLUTION ORAL DAILY
Qty: 510 G | Refills: 5 | Status: SHIPPED | OUTPATIENT
Start: 2019-05-14 | End: 2019-06-13

## 2019-05-14 RX ORDER — HYDROCODONE BITARTRATE AND ACETAMINOPHEN 5; 325 MG/1; MG/1
.5-1 TABLET ORAL 2 TIMES DAILY
Qty: 60 TABLET | Refills: 0 | Status: SHIPPED | OUTPATIENT
Start: 2019-05-14 | End: 2019-06-14 | Stop reason: SDUPTHER

## 2019-05-14 RX ORDER — TRAZODONE HYDROCHLORIDE 50 MG/1
25-50 TABLET ORAL NIGHTLY
Qty: 30 TABLET | Refills: 2 | Status: SHIPPED | OUTPATIENT
Start: 2019-05-14 | End: 2019-08-19 | Stop reason: SDUPTHER

## 2019-05-14 ASSESSMENT — PATIENT HEALTH QUESTIONNAIRE - PHQ9: DEPRESSION UNABLE TO ASSESS: FUNCTIONAL CAPACITY MOTIVATION LIMITS ACCURACY

## 2019-05-14 NOTE — PROGRESS NOTES
Patient: Adalid Lo is a 78 y.o. female who presents today with the following Chief Complaint(s):  Chief Complaint   Patient presents with   [address]:  1. Hypercholesteremia  Pending labs  - Lipid Panel  - Comprehensive Metabolic Panel    2. Panlobular emphysema ([facility] [address]  Stable  Follow-up as needed    3. Diet-controlled diabetes mellitus ([facility] [address]  Pending A1c  Follow diabetic diet    4. Lumbar disc disease  Stable  [place] report was obtained. No inappropriate prescriptions were noted. Pain is not well controlled. Increase Norco from 1/2 a tablet daily to 1/2-1 tablet daily  Follow-up in 3 months  - HYDROcodone-acetaminophen (NORCO) 5-325 MG per tablet; Take 0.5-1 tablets by mouth 2 times daily for 30 days. Dispense: 60 tablet; Refill: 0    5. Urinary frequency  1+ leukocytes on urine dip. Send for culture  - POCT Urinalysis No Micro (Auto)    6. Primary insomnia  Discontinue Remeron and Aricept  Start trazodone  Follow-up in 3 months or sooner as needed  - traZODone (DESYREL) 50 MG tablet; Take 0.5-1 tablets by mouth nightly  Dispense: 30 tablet; Refill: 2    7. Constipation, unspecified constipation type  Discontinue Bentyl and start daily MiraLAX  Follow-up as needed  - polyethylene glycol (GLYCOLAX) powder; Take 17 g by mouth daily  Dispense: 510 g; Refill: 5    Quality & Risk Score Accuracy    Visit Dx:  J43.1 - Panlobular emphysema (HCC)  Assessment and plan:  Stable based upon symptoms and exam. Continue current treatment plan and follow up at least yearly. Last edited 05/14/19 15:07 EDT by Mortimer Ades, APRN - CNP          HPI  Edy Castaneda is in the office with her son checkup     HLD  Diet controlled. The patient and son have decided not to treat with medication. Denies chest pain, ankle edema or palpitations    COPD  She does not use inhalers. She does use Singulair for runny nose. Denies cough, dyspnea or wheezing.     Diet-controlled diabetes  She has been cheating more with furosemide (LASIX) 20 MG tablet One daily prn edema up to 2 times a week. 8 tablet 5    Multiple Vitamins-Minerals (MULTIVITAMIN WITH MINERALS) tablet Take 1 tablet by mouth daily 30 tablet 11    aspirin EC 81 MG EC tablet Take 1 tablet by mouth daily 30 tablet 3    Cholecalciferol (VITAMIN D-3) 1000 UNITS CAPS Take 3,000 Units by mouth daily 30 capsule      No current facility-administered medications for this visit. Patient's past medical history, surgical history, family history, medications,  and allergies  were all reviewed and updated as appropriate today. Review of Systems  See HPI    Physical Exam   Constitutional: She appears well-developed. Non-toxic appearance. No distress. HENT:   Head: Normocephalic and atraumatic. Mouth/Throat: Oropharynx is clear and moist.   Eyes: Pupils are equal, round, and reactive to light. Neck: Normal range of motion. Neck supple. Cardiovascular: Normal rate, regular rhythm, normal heart sounds and intact distal pulses. No murmur heard. Pulmonary/Chest: Effort normal and breath sounds normal. She has no wheezes. Abdominal: Soft. Bowel sounds are normal. There is tenderness in the suprapubic area. There is no CVA tenderness. Neurological: She is alert. Skin: Skin is warm and dry. Capillary refill takes 2 to 3 seconds. No rash noted. Psychiatric: She has a normal mood and affect. Nursing note and vitals reviewed. Vitals:    05/14/19 1359   BP: 122/76   Pulse: 64   Temp: 97.4 °F (36.3 °C)   TempSrc: Oral   SpO2: 94%   Weight: 174 lb 6 oz (79.1 kg)           BRANDY Malik-CNP    The note was completedusing Dragon voice recognition transcription. Every effort was made to ensure accuracy; however, inadvertent  transcription errors may be present despite my best efforts to edit errors.

## 2019-05-15 LAB
A/G RATIO: 1.3 (ref 1.1–2.2)
ALBUMIN SERPL-MCNC: 4 G/DL (ref 3.4–5)
ALP BLD-CCNC: 79 U/L (ref 40–129)
ALT SERPL-CCNC: 14 U/L (ref 10–40)
ANION GAP SERPL CALCULATED.3IONS-SCNC: 14 MMOL/L (ref 3–16)
AST SERPL-CCNC: 16 U/L (ref 15–37)
BILIRUB SERPL-MCNC: 0.5 MG/DL (ref 0–1)
BUN BLDV-MCNC: 12 MG/DL (ref 7–20)
CALCIUM SERPL-MCNC: 9.8 MG/DL (ref 8.3–10.6)
CHLORIDE BLD-SCNC: 103 MMOL/L (ref 99–110)
CHOLESTEROL, TOTAL: 252 MG/DL (ref 0–199)
CO2: 25 MMOL/L (ref 21–32)
CREAT SERPL-MCNC: 0.8 MG/DL (ref 0.6–1.2)
GFR AFRICAN AMERICAN: >60
GFR NON-AFRICAN AMERICAN: >60
GLOBULIN: 3.1 G/DL
GLUCOSE BLD-MCNC: 129 MG/DL (ref 70–99)
HDLC SERPL-MCNC: 46 MG/DL (ref 40–60)
LDL CHOLESTEROL CALCULATED: 150 MG/DL
POTASSIUM SERPL-SCNC: 4.3 MMOL/L (ref 3.5–5.1)
SODIUM BLD-SCNC: 142 MMOL/L (ref 136–145)
TOTAL PROTEIN: 7.1 G/DL (ref 6.4–8.2)
TRIGL SERPL-MCNC: 282 MG/DL (ref 0–150)
VLDLC SERPL CALC-MCNC: 56 MG/DL

## 2019-05-16 LAB — URINE CULTURE, ROUTINE: NORMAL

## 2019-05-22 DIAGNOSIS — E53.8 B12 DEFICIENCY: Primary | ICD-10-CM

## 2019-05-22 DIAGNOSIS — E11.9 DIET-CONTROLLED DIABETES MELLITUS (HCC): ICD-10-CM

## 2019-06-14 DIAGNOSIS — M51.9 LUMBAR DISC DISEASE: ICD-10-CM

## 2019-06-14 RX ORDER — HYDROCODONE BITARTRATE AND ACETAMINOPHEN 5; 325 MG/1; MG/1
.5-1 TABLET ORAL 2 TIMES DAILY
Qty: 60 TABLET | Refills: 0 | Status: SHIPPED | OUTPATIENT
Start: 2019-06-14 | End: 2019-07-15 | Stop reason: SDUPTHER

## 2019-06-24 RX ORDER — MONTELUKAST SODIUM 10 MG/1
TABLET ORAL
Qty: 30 TABLET | Refills: 4 | Status: SHIPPED | OUTPATIENT
Start: 2019-06-24 | End: 2019-11-21 | Stop reason: SDUPTHER

## 2019-06-28 ENCOUNTER — OFFICE VISIT (OUTPATIENT)
Dept: FAMILY MEDICINE CLINIC | Age: 79
End: 2019-06-28
Payer: MEDICARE

## 2019-06-28 VITALS
HEART RATE: 92 BPM | WEIGHT: 171.5 LBS | TEMPERATURE: 98.5 F | BODY MASS INDEX: 29.44 KG/M2 | OXYGEN SATURATION: 94 % | DIASTOLIC BLOOD PRESSURE: 66 MMHG | SYSTOLIC BLOOD PRESSURE: 100 MMHG

## 2019-06-28 DIAGNOSIS — G30.1 LATE ONSET ALZHEIMER'S DISEASE WITHOUT BEHAVIORAL DISTURBANCE (HCC): ICD-10-CM

## 2019-06-28 DIAGNOSIS — E11.9 DIET-CONTROLLED DIABETES MELLITUS (HCC): ICD-10-CM

## 2019-06-28 DIAGNOSIS — K59.03 CONSTIPATION DUE TO PAIN MEDICATION: ICD-10-CM

## 2019-06-28 DIAGNOSIS — J18.9 PNEUMONIA OF LEFT UPPER LOBE DUE TO INFECTIOUS ORGANISM: Primary | ICD-10-CM

## 2019-06-28 DIAGNOSIS — F02.80 LATE ONSET ALZHEIMER'S DISEASE WITHOUT BEHAVIORAL DISTURBANCE (HCC): ICD-10-CM

## 2019-06-28 LAB
BILIRUBIN, POC: NORMAL
BLOOD URINE, POC: NORMAL
CLARITY, POC: NORMAL
COLOR, POC: YELLOW
GLUCOSE URINE, POC: NORMAL
KETONES, POC: NORMAL
LEUKOCYTE EST, POC: NORMAL
NITRITE, POC: NORMAL
PH, POC: 5
PROTEIN, POC: NORMAL
SPECIFIC GRAVITY, POC: >=1.03
UROBILINOGEN, POC: 0.2

## 2019-06-28 PROCEDURE — 1090F PRES/ABSN URINE INCON ASSESS: CPT | Performed by: NURSE PRACTITIONER

## 2019-06-28 PROCEDURE — 81003 URINALYSIS AUTO W/O SCOPE: CPT | Performed by: NURSE PRACTITIONER

## 2019-06-28 PROCEDURE — 99214 OFFICE O/P EST MOD 30 MIN: CPT | Performed by: NURSE PRACTITIONER

## 2019-06-28 PROCEDURE — 1036F TOBACCO NON-USER: CPT | Performed by: NURSE PRACTITIONER

## 2019-06-28 PROCEDURE — G8399 PT W/DXA RESULTS DOCUMENT: HCPCS | Performed by: NURSE PRACTITIONER

## 2019-06-28 PROCEDURE — 4040F PNEUMOC VAC/ADMIN/RCVD: CPT | Performed by: NURSE PRACTITIONER

## 2019-06-28 PROCEDURE — 1123F ACP DISCUSS/DSCN MKR DOCD: CPT | Performed by: NURSE PRACTITIONER

## 2019-06-28 PROCEDURE — G8427 DOCREV CUR MEDS BY ELIG CLIN: HCPCS | Performed by: NURSE PRACTITIONER

## 2019-06-28 PROCEDURE — G8419 CALC BMI OUT NRM PARAM NOF/U: HCPCS | Performed by: NURSE PRACTITIONER

## 2019-06-28 PROCEDURE — 36415 COLL VENOUS BLD VENIPUNCTURE: CPT | Performed by: NURSE PRACTITIONER

## 2019-06-28 PROCEDURE — G8598 ASA/ANTIPLAT THER USED: HCPCS | Performed by: NURSE PRACTITIONER

## 2019-06-28 RX ORDER — DONEPEZIL HYDROCHLORIDE 10 MG/1
TABLET, FILM COATED ORAL
Qty: 30 TABLET | Refills: 5 | Status: SHIPPED | OUTPATIENT
Start: 2019-06-28 | End: 2020-01-07

## 2019-06-28 RX ORDER — DOCUSATE SODIUM 100 MG/1
100 CAPSULE, LIQUID FILLED ORAL 2 TIMES DAILY PRN
Qty: 60 CAPSULE | Refills: 3 | Status: SHIPPED
Start: 2019-06-28 | End: 2019-07-15

## 2019-06-28 RX ORDER — DOXYCYCLINE HYCLATE 100 MG
100 TABLET ORAL 2 TIMES DAILY
Qty: 20 TABLET | Refills: 0 | Status: SHIPPED | OUTPATIENT
Start: 2019-06-28 | End: 2019-07-08

## 2019-06-28 NOTE — PROGRESS NOTES
to visit her daughter. Normally a visit daughter once weekly and she knows who her daughter is. Over the past month she will want to leave her house to go somewhere but when she gets there she wants to go back home. She has not had any aggressive behaviors. She has had more difficulty finding words and remembering people's names. Current Outpatient Medications   Medication Sig Dispense Refill    montelukast (SINGULAIR) 10 MG tablet TAKE ONE TABLET BY MOUTH DAILY 30 tablet 4    HYDROcodone-acetaminophen (NORCO) 5-325 MG per tablet Take 0.5-1 tablets by mouth 2 times daily for 30 days. 60 tablet 0    traZODone (DESYREL) 50 MG tablet Take 0.5-1 tablets by mouth nightly 30 tablet 2    Aspirin-Acetaminophen-Caffeine (EXCEDRIN MIGRAINE PO) Take 2 tablets by mouth daily      rizatriptan (MAXALT) 10 MG tablet TAKE ONE TABLET BY MOUTH AT ONSET OF HEADACHE; MAY REPEAT ONE TABLET IN 2 HOURS IF NEEDED. MAX OF 2 TABLETS PER DAY 9 tablet 3    Multiple Vitamins-Minerals (MULTIVITAMIN WITH MINERALS) tablet Take 1 tablet by mouth daily 30 tablet 11    aspirin EC 81 MG EC tablet Take 1 tablet by mouth daily 30 tablet 3    Cholecalciferol (VITAMIN D-3) 1000 UNITS CAPS Take 3,000 Units by mouth daily 30 capsule      No current facility-administered medications for this visit. Patient's past medical history, surgical history, family history, medications,  and allergies  were all reviewed and updated as appropriate today. Review of Systems  See HPI    Physical Exam   Constitutional:  Non-toxic appearance. She appears ill. Falling asleep in chair. Awakens easily when her name is called. HENT:   Head: Normocephalic and atraumatic. Right Ear: Tympanic membrane is erythematous. Left Ear: Hearing and tympanic membrane normal.   Nose: Mucosal edema present. Right sinus exhibits maxillary sinus tenderness. Mouth/Throat: Oropharynx is clear and moist.   Eyes: EOM are normal.   Neck: Normal range of motion.

## 2019-06-28 NOTE — PATIENT INSTRUCTIONS
Please read the healthy family handout that you were given and share it with your family. Please compare this printed medication list with your medications at home to be sure they are the same. If you have any medications that are different please contact us immediately at [phone]. Also review your allergies that we have listed, these may also include medications that you have not been able to tolerate, make sure everything listed is correct. If you have any allergies that are different please contact us immediately at [phone].

## 2019-06-29 LAB
ESTIMATED AVERAGE GLUCOSE: 162.8 MG/DL
HBA1C MFR BLD: 7.3 %

## 2019-07-15 ENCOUNTER — OFFICE VISIT (OUTPATIENT)
Dept: FAMILY MEDICINE CLINIC | Age: 79
End: 2019-07-15
Payer: MEDICARE

## 2019-07-15 ENCOUNTER — HOSPITAL ENCOUNTER (OUTPATIENT)
Dept: GENERAL RADIOLOGY | Age: 79
Discharge: HOME | End: 2019-07-15
Payer: MEDICARE

## 2019-07-15 ENCOUNTER — HOSPITAL ENCOUNTER (OUTPATIENT)
Age: 79
Discharge: HOME | End: 2019-07-15
Payer: MEDICARE

## 2019-07-15 VITALS
SYSTOLIC BLOOD PRESSURE: 114 MMHG | OXYGEN SATURATION: 97 % | TEMPERATURE: 98.3 F | HEART RATE: 57 BPM | WEIGHT: 173 LBS | BODY MASS INDEX: 29.7 KG/M2 | DIASTOLIC BLOOD PRESSURE: 65 MMHG

## 2019-07-15 DIAGNOSIS — M51.9 LUMBAR DISC DISEASE: ICD-10-CM

## 2019-07-15 DIAGNOSIS — R10.84 GENERALIZED ABDOMINAL PAIN: Primary | ICD-10-CM

## 2019-07-15 DIAGNOSIS — R10.84 GENERALIZED ABDOMINAL PAIN: ICD-10-CM

## 2019-07-15 PROCEDURE — 99214 OFFICE O/P EST MOD 30 MIN: CPT | Performed by: NURSE PRACTITIONER

## 2019-07-15 PROCEDURE — 1090F PRES/ABSN URINE INCON ASSESS: CPT | Performed by: NURSE PRACTITIONER

## 2019-07-15 PROCEDURE — 1123F ACP DISCUSS/DSCN MKR DOCD: CPT | Performed by: NURSE PRACTITIONER

## 2019-07-15 PROCEDURE — G8419 CALC BMI OUT NRM PARAM NOF/U: HCPCS | Performed by: NURSE PRACTITIONER

## 2019-07-15 PROCEDURE — 4040F PNEUMOC VAC/ADMIN/RCVD: CPT | Performed by: NURSE PRACTITIONER

## 2019-07-15 PROCEDURE — G8427 DOCREV CUR MEDS BY ELIG CLIN: HCPCS | Performed by: NURSE PRACTITIONER

## 2019-07-15 PROCEDURE — 1036F TOBACCO NON-USER: CPT | Performed by: NURSE PRACTITIONER

## 2019-07-15 PROCEDURE — 74019 RADEX ABDOMEN 2 VIEWS: CPT

## 2019-07-15 PROCEDURE — G8399 PT W/DXA RESULTS DOCUMENT: HCPCS | Performed by: NURSE PRACTITIONER

## 2019-07-15 PROCEDURE — G8598 ASA/ANTIPLAT THER USED: HCPCS | Performed by: NURSE PRACTITIONER

## 2019-07-15 RX ORDER — HYDROCODONE BITARTRATE AND ACETAMINOPHEN 5; 325 MG/1; MG/1
.5-1 TABLET ORAL 2 TIMES DAILY
Qty: 60 TABLET | Refills: 0 | Status: SHIPPED | OUTPATIENT
Start: 2019-07-15 | End: 2019-08-14 | Stop reason: SDUPTHER

## 2019-07-15 NOTE — PROGRESS NOTES
Oral   SpO2: 97%   Weight: 173 lb (78.5 kg)           DIOMEDES Araiza    The note was completedusing Dragon voice recognition transcription. Every effort was made to ensure accuracy; however, inadvertent  transcription errors may be present despite my best efforts to edit errors.

## 2019-08-09 ENCOUNTER — CARE COORDINATION (OUTPATIENT)
Dept: CARE COORDINATION | Age: 79
End: 2019-08-09

## 2019-08-14 DIAGNOSIS — M51.9 LUMBAR DISC DISEASE: ICD-10-CM

## 2019-08-14 RX ORDER — HYDROCODONE BITARTRATE AND ACETAMINOPHEN 5; 325 MG/1; MG/1
.5-1 TABLET ORAL 2 TIMES DAILY
Qty: 60 TABLET | Refills: 0 | Status: SHIPPED | OUTPATIENT
Start: 2019-08-14 | End: 2019-09-16 | Stop reason: SDUPTHER

## 2019-08-19 DIAGNOSIS — F51.01 PRIMARY INSOMNIA: ICD-10-CM

## 2019-08-20 RX ORDER — TRAZODONE HYDROCHLORIDE 50 MG/1
TABLET ORAL
Qty: 30 TABLET | Refills: 1 | Status: SHIPPED | OUTPATIENT
Start: 2019-08-20 | End: 2019-10-16

## 2019-09-13 DIAGNOSIS — M51.9 LUMBAR DISC DISEASE: ICD-10-CM

## 2019-09-16 RX ORDER — HYDROCODONE BITARTRATE AND ACETAMINOPHEN 5; 325 MG/1; MG/1
.5-1 TABLET ORAL 2 TIMES DAILY
Qty: 60 TABLET | Refills: 0 | Status: SHIPPED | OUTPATIENT
Start: 2019-09-16 | End: 2019-10-15 | Stop reason: SDUPTHER

## 2019-09-16 NOTE — TELEPHONE ENCOUNTER
Date of last refill of this med was 8-14-19, # of pills given 60 and # of refills given 0. Their next appointment is 10-16-19, the last date patient was seen was 7-15-19. Does patient have medication agreement on file? No  Has drug screen been done in last 12 months if needed?  no

## 2019-10-15 DIAGNOSIS — M51.9 LUMBAR DISC DISEASE: ICD-10-CM

## 2019-10-15 RX ORDER — HYDROCODONE BITARTRATE AND ACETAMINOPHEN 5; 325 MG/1; MG/1
.5-1 TABLET ORAL 2 TIMES DAILY
Qty: 60 TABLET | Refills: 0 | Status: SHIPPED | OUTPATIENT
Start: 2019-10-15 | End: 2019-11-14 | Stop reason: SDUPTHER

## 2019-10-16 ENCOUNTER — OFFICE VISIT (OUTPATIENT)
Dept: FAMILY MEDICINE CLINIC | Age: 79
End: 2019-10-16
Payer: MEDICARE

## 2019-10-16 VITALS
SYSTOLIC BLOOD PRESSURE: 126 MMHG | BODY MASS INDEX: 28.71 KG/M2 | OXYGEN SATURATION: 96 % | WEIGHT: 167.25 LBS | HEART RATE: 83 BPM | TEMPERATURE: 97.4 F | DIASTOLIC BLOOD PRESSURE: 74 MMHG

## 2019-10-16 DIAGNOSIS — M51.9 LUMBAR DISC DISEASE: ICD-10-CM

## 2019-10-16 DIAGNOSIS — G30.1 LATE ONSET ALZHEIMER'S DISEASE WITHOUT BEHAVIORAL DISTURBANCE (HCC): Primary | ICD-10-CM

## 2019-10-16 DIAGNOSIS — E11.9 TYPE 2 DIABETES MELLITUS WITHOUT COMPLICATION, WITHOUT LONG-TERM CURRENT USE OF INSULIN (HCC): ICD-10-CM

## 2019-10-16 DIAGNOSIS — F02.80 LATE ONSET ALZHEIMER'S DISEASE WITHOUT BEHAVIORAL DISTURBANCE (HCC): Primary | ICD-10-CM

## 2019-10-16 DIAGNOSIS — F51.01 PRIMARY INSOMNIA: ICD-10-CM

## 2019-10-16 PROBLEM — R60.0 LOWER EXTREMITY EDEMA: Status: RESOLVED | Noted: 2018-04-13 | Resolved: 2019-10-16

## 2019-10-16 PROBLEM — F03.90 DEMENTIA (HCC): Status: RESOLVED | Noted: 2017-04-19 | Resolved: 2019-10-16

## 2019-10-16 PROBLEM — R00.1 BRADYCARDIA: Status: RESOLVED | Noted: 2017-09-05 | Resolved: 2019-10-16

## 2019-10-16 PROBLEM — R06.02 SOB (SHORTNESS OF BREATH): Status: RESOLVED | Noted: 2019-03-13 | Resolved: 2019-10-16

## 2019-10-16 LAB
A/G RATIO: 2.7 (ref 1.1–2.2)
ALBUMIN SERPL-MCNC: 4.8 G/DL (ref 3.4–5)
ALP BLD-CCNC: 62 U/L (ref 40–129)
ALT SERPL-CCNC: 15 U/L (ref 10–40)
ANION GAP SERPL CALCULATED.3IONS-SCNC: 17 MMOL/L (ref 3–16)
AST SERPL-CCNC: 17 U/L (ref 15–37)
BILIRUB SERPL-MCNC: 0.6 MG/DL (ref 0–1)
BUN BLDV-MCNC: 17 MG/DL (ref 7–20)
CALCIUM SERPL-MCNC: 9.5 MG/DL (ref 8.3–10.6)
CHLORIDE BLD-SCNC: 103 MMOL/L (ref 99–110)
CO2: 24 MMOL/L (ref 21–32)
CREAT SERPL-MCNC: 0.8 MG/DL (ref 0.6–1.2)
GFR AFRICAN AMERICAN: >60
GFR NON-AFRICAN AMERICAN: >60
GLOBULIN: 1.8 G/DL
GLUCOSE BLD-MCNC: 201 MG/DL (ref 70–99)
POTASSIUM SERPL-SCNC: 4.2 MMOL/L (ref 3.5–5.1)
SODIUM BLD-SCNC: 144 MMOL/L (ref 136–145)
TOTAL PROTEIN: 6.6 G/DL (ref 6.4–8.2)

## 2019-10-16 PROCEDURE — 1123F ACP DISCUSS/DSCN MKR DOCD: CPT | Performed by: NURSE PRACTITIONER

## 2019-10-16 PROCEDURE — G8484 FLU IMMUNIZE NO ADMIN: HCPCS | Performed by: NURSE PRACTITIONER

## 2019-10-16 PROCEDURE — G8399 PT W/DXA RESULTS DOCUMENT: HCPCS | Performed by: NURSE PRACTITIONER

## 2019-10-16 PROCEDURE — G8598 ASA/ANTIPLAT THER USED: HCPCS | Performed by: NURSE PRACTITIONER

## 2019-10-16 PROCEDURE — G8427 DOCREV CUR MEDS BY ELIG CLIN: HCPCS | Performed by: NURSE PRACTITIONER

## 2019-10-16 PROCEDURE — 1036F TOBACCO NON-USER: CPT | Performed by: NURSE PRACTITIONER

## 2019-10-16 PROCEDURE — G8510 SCR DEP NEG, NO PLAN REQD: HCPCS | Performed by: NURSE PRACTITIONER

## 2019-10-16 PROCEDURE — 4040F PNEUMOC VAC/ADMIN/RCVD: CPT | Performed by: NURSE PRACTITIONER

## 2019-10-16 PROCEDURE — 99214 OFFICE O/P EST MOD 30 MIN: CPT | Performed by: NURSE PRACTITIONER

## 2019-10-16 PROCEDURE — G8419 CALC BMI OUT NRM PARAM NOF/U: HCPCS | Performed by: NURSE PRACTITIONER

## 2019-10-16 PROCEDURE — 1090F PRES/ABSN URINE INCON ASSESS: CPT | Performed by: NURSE PRACTITIONER

## 2019-10-16 RX ORDER — TRAZODONE HYDROCHLORIDE 100 MG/1
100 TABLET ORAL NIGHTLY
Qty: 30 TABLET | Refills: 2 | Status: SHIPPED | OUTPATIENT
Start: 2019-10-16 | End: 2019-11-15

## 2019-10-16 ASSESSMENT — PATIENT HEALTH QUESTIONNAIRE - PHQ9: DEPRESSION UNABLE TO ASSESS: FUNCTIONAL CAPACITY MOTIVATION LIMITS ACCURACY

## 2019-10-16 ASSESSMENT — ENCOUNTER SYMPTOMS
SHORTNESS OF BREATH: 0
VOMITING: 0
COUGH: 0

## 2019-10-17 LAB
ESTIMATED AVERAGE GLUCOSE: 139.9 MG/DL
HBA1C MFR BLD: 6.5 %

## 2019-10-18 RX ORDER — TRAZODONE HYDROCHLORIDE 50 MG/1
TABLET ORAL
Qty: 30 TABLET | Refills: 0 | OUTPATIENT
Start: 2019-10-18

## 2019-10-18 RX ORDER — SITAGLIPTIN 100 MG/1
TABLET, FILM COATED ORAL
Qty: 30 TABLET | Refills: 1 | OUTPATIENT
Start: 2019-10-18

## 2019-11-14 DIAGNOSIS — M51.9 LUMBAR DISC DISEASE: ICD-10-CM

## 2019-11-14 RX ORDER — HYDROCODONE BITARTRATE AND ACETAMINOPHEN 5; 325 MG/1; MG/1
.5-1 TABLET ORAL 2 TIMES DAILY
Qty: 60 TABLET | Refills: 0 | Status: SHIPPED | OUTPATIENT
Start: 2019-11-14 | End: 2019-12-23 | Stop reason: SDUPTHER

## 2019-11-21 RX ORDER — MONTELUKAST SODIUM 10 MG/1
TABLET ORAL
Qty: 30 TABLET | Refills: 3 | Status: SHIPPED | OUTPATIENT
Start: 2019-11-21 | End: 2019-12-23

## 2019-12-05 ENCOUNTER — APPOINTMENT (OUTPATIENT)
Dept: GENERAL RADIOLOGY | Age: 79
End: 2019-12-05
Payer: MEDICARE

## 2019-12-05 ENCOUNTER — APPOINTMENT (OUTPATIENT)
Dept: CT IMAGING | Age: 79
End: 2019-12-05
Payer: MEDICARE

## 2019-12-05 ENCOUNTER — HOSPITAL ENCOUNTER (EMERGENCY)
Age: 79
Discharge: HOME | End: 2019-12-05
Payer: MEDICARE

## 2019-12-05 VITALS
WEIGHT: 167 LBS | DIASTOLIC BLOOD PRESSURE: 84 MMHG | HEIGHT: 64 IN | SYSTOLIC BLOOD PRESSURE: 123 MMHG | RESPIRATION RATE: 14 BRPM | BODY MASS INDEX: 28.51 KG/M2 | OXYGEN SATURATION: 97 % | HEART RATE: 67 BPM | TEMPERATURE: 97.6 F
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
## 2019-12-05 DIAGNOSIS — F03.90 CHRONIC DEMENTIA WITHOUT BEHAVIORAL DISTURBANCE (HCC): ICD-10-CM

## 2019-12-05 DIAGNOSIS — M25.519 SHOULDER PAIN, UNSPECIFIED CHRONICITY, UNSPECIFIED LATERALITY: ICD-10-CM

## 2019-12-05 DIAGNOSIS — S40.022A ARM BRUISE, LEFT, INITIAL ENCOUNTER: ICD-10-CM

## 2019-12-05 PROCEDURE — 71045 X-RAY EXAM CHEST 1 VIEW: CPT

## 2019-12-05 PROCEDURE — 70450 CT HEAD/BRAIN W/O DYE: CPT

## 2019-12-05 PROCEDURE — 73030 X-RAY EXAM OF SHOULDER: CPT

## 2019-12-05 PROCEDURE — 99284 EMERGENCY DEPT VISIT MOD MDM: CPT

## 2019-12-05 PROCEDURE — 72170 X-RAY EXAM OF PELVIS: CPT

## 2019-12-06 ASSESSMENT — ENCOUNTER SYMPTOMS
VOMITING: 0
SHORTNESS OF BREATH: 0
BACK PAIN: 0
ABDOMINAL PAIN: 0

## 2019-12-23 DIAGNOSIS — M51.9 LUMBAR DISC DISEASE: ICD-10-CM

## 2019-12-23 RX ORDER — MONTELUKAST SODIUM 10 MG/1
TABLET ORAL
Qty: 30 TABLET | Refills: 3 | Status: SHIPPED | OUTPATIENT
Start: 2019-12-23

## 2019-12-23 RX ORDER — HYDROCODONE BITARTRATE AND ACETAMINOPHEN 5; 325 MG/1; MG/1
.5-1 TABLET ORAL 2 TIMES DAILY
Qty: 60 TABLET | Refills: 0 | Status: SHIPPED | OUTPATIENT
Start: 2019-12-23 | End: 2020-01-03 | Stop reason: SDUPTHER

## 2020-01-02 ENCOUNTER — TELEPHONE (OUTPATIENT)
Dept: FAMILY MEDICINE CLINIC | Age: 80
End: 2020-01-02

## 2020-01-02 NOTE — TELEPHONE ENCOUNTER
omid calling about patient. She received a call from patient's son about having patient admitted for long term care.  They wanted to see if you would write orders to admit patient or if you need to see her in the office first

## 2020-01-03 RX ORDER — HYDROCODONE BITARTRATE AND ACETAMINOPHEN 5; 325 MG/1; MG/1
.5-1 TABLET ORAL 2 TIMES DAILY
Qty: 60 TABLET | Refills: 0 | Status: SHIPPED | OUTPATIENT
Start: 2020-01-03 | End: 2020-02-02

## 2020-01-07 ENCOUNTER — OUTSIDE SERVICES (OUTPATIENT)
Dept: FAMILY MEDICINE CLINIC | Age: 80
End: 2020-01-07

## 2020-01-07 ENCOUNTER — OUTSIDE SERVICES (OUTPATIENT)
Dept: FAMILY MEDICINE CLINIC | Age: 80
End: 2020-01-07
Payer: MEDICARE

## 2020-01-07 PROBLEM — K86.89 PANCREATIC MASS: Status: RESOLVED | Noted: 2017-04-19 | Resolved: 2020-01-07

## 2020-01-07 PROCEDURE — 99305 1ST NF CARE MODERATE MDM 35: CPT | Performed by: FAMILY MEDICINE

## 2020-01-07 NOTE — PROGRESS NOTES
[facility] ADMISSION HISTORY AND PHYSICAL    Patient: Ernestine Starks     : 1940     Date of service:  2020    Date of admission: January 3, 2020  Status: non skilled    Assessment and Plan:   Diagnosis Orders   1. Late onset Alzheimer's disease without behavioral disturbance [facility])   patient will be transferred to [facility] when bed available   2. Type 2 diabetes mellitus without complication, without long-term current use of insulin ([facility] [address]     3. PVD (peripheral vascular disease) ([facility] [address]     4. Arthritis     5. Panlobular emphysema ([facility] [address]     6. Chronic migraine without aura without status migrainosus, not intractable     7. Primary insomnia     8. Acute cystitis without hematuria   complete course of Macrobid pending results of urine culture       All problems above are stable unless otherwise mentioned. Continue current medication and treatment plan. Brian Birmingham MD    Subjective:    Patient seen at the [facility]. She was recently admitted from home because of progressive dementia. She had been cared for by her son. There is a concern she had a UTI, a UA and culture was obtained and she was started on Macrobid. Results are pending. She is very restless and quite confused, and is wandering around, accompanied by an attendant until [facility] is available for her tomorrow. She has a history of diabetes treated with Januvia. She had been on Aricept in the past but apparently this is been discontinued. She indicates no claudication. She is on low-dose hydrocodone twice a day as needed for a long history of arthritis. She has a history of emphysema but there is been no shortness of breath. She carries a past history of migraine but no indication of any current migraine. She is on trazodone for sleep.     Past Medical History:   Diagnosis Date    Allergic rhinitis     CTS (carpal tunnel syndrome)     Diabetes     Hyperlipidemia     Hypertension     Lactic acidosis     due to metformin    OA (osteoarthritis)     Stroke [facility]) 2008    Memory        Past Surgical History:   Procedure Laterality Date    BREAST BIOPSY      CARPAL TUNNEL RELEASE      CATARACT REMOVAL WITH IMPLANT  13    right    EYE SURGERY  12    cataract with implant Left Eye    HYSTERECTOMY      LUMBAR LAMINECTOMY      SHOULDER ARTHROSCOPY      LEFT    KIKO AND BSO          Medications on admission to the [facility]:   Current Outpatient Medications   Medication Sig Dispense Refill    SITagliptin (JANUVIA) 100 MG tablet Take 1 tablet by mouth daily 1 tablet 0    HYDROcodone-acetaminophen (NORCO) 5-325 MG per tablet Take 0.5-1 tablets by mouth 2 times daily for 30 days. 60 tablet 0    montelukast (SINGULAIR) 10 MG tablet TAKE ONE TABLET BY MOUTH DAILY 30 tablet 3    traZODone (DESYREL) 100 MG tablet Take 1 tablet by mouth nightly 30 tablet 2    Polyethylene Glycol 3350 (MIRALAX PO) Take by mouth      Aspirin-Acetaminophen-Caffeine (EXCEDRIN MIGRAINE PO) Take 2 tablets by mouth daily       No current facility-administered medications for this visit.         Allergies   Allergen Reactions    Avandia [Rosiglitazone Maleate]     Codeine      Per son Codeine caused patient to go into cardiac arrest    Glucophage [Metformin Hydrochloride]      Lactic acidosis      Hydrochlorothiazide     Tramadol         Family History   Problem Relation Age of Onset    Arthritis Mother     Cancer Mother         leukemia    Diabetes Mother     Heart Disease Mother     Arthritis Father     Arthritis Sister     Other Sister         Muscular dystrophy    Arthritis Brother     High Blood Pressure Brother         Social History     Tobacco Use    Smoking status: Former Smoker     Packs/day: 2.00     Years: 15.00     Pack years: 30.00     Types: Cigarettes     Last attempt to quit: 2013     Years since quittin.6    Smokeless tobacco: Never Used   Substance Use Topics    Alcohol use: No         Immunization History   Administered Date(s) Administered    Influenza 09/02/2011, 09/21/2012, 10/25/2013    Influenza Virus Vaccine 10/01/2009, 10/27/2014, 10/06/2015    Influenza Whole 09/23/2010    Influenza, Jessica Carrier, IM, (6 mo and older Fluzone, Flulaval, Fluarix and 3 yrs and older Afluria) 12/05/2016    Pneumococcal Conjugate 13-valent ([id number]) 10/06/2015    Pneumococcal Conjugate 7-valent (Prevnar7) 08/31/2005    Pneumococcal Polysaccharide ([id number]) 09/23/2010       Review of systems:    Constitutional:     Unexplained weight loss - no     Fever - no  Eye:     Drainage/matting - no     Blurred vision - no  ENT:     Head congestion - no     Hearing loss - no  Respiratory     Dyspnea  - no     Wheeze - no  Cardiac:     Chest pain or discomfort - no     Edema - no     Gastrointestinal     Constipation - no     Diarrhea - no  Urologic     Urinary incontinence - no     Dysuria - no  Skin:     Rash - no     Decubitus - no  Neurologic     Headache - no     Dizziness - no     Psychiatric        Anxiety - no     Depression - no    Objective:  Vitals 18/7/2270   SYSTOLIC 850   DIASTOLIC 84   Pulse 67   Temp 97.6   Resp 14   SpO2 97   Weight 167 lb   Height 5' 4\"   BMI (wt*703/ht~2) 28.66 kg/m2   Pain Level -   Some recent data might be hidden      Patient is alert, pleasant and mostly cooperative. However she is completely confused, not oriented to person place or time.   Eyes:  Conjunctivae and lids are clear             Pupils are equal, round, and reactive, irises nondeformed  Ears:  External ears and nose unremarkable, canals are clear, TMs clear   Mouth:  Lips, gums, tongue, oral mucosa unremarkable  Throat: Palates unremarkable               Pharynx clear  Nose: clear  Neck:  No masses, trachea midline, phonation normal, thyroid unremarkable  Lymphatics:  No significant cervical or supraclavicular adenopathy  Chest:  No deformity of thorax              Respirations easy and unlabored with equal breath sounds              Lungs clear  Heart:  Rhythm - regular               Murmurs - none              Gallop - none               JVD - none              Pretibial edema - 1+ bilateral  Abdomen:  Soft  with no masses noted                     Hernia - none                    Liver and spleen not palpably enlarged                    Bowel sounds are normally active                    Tenderness - none                    Rebound or rididity - none  Neurologic:  Cranial nerves 2-12 are intact                      No ataxia                     No focal motor deficits found     Skin: Warm and dry, turgor normal           No rash            No decubitus. No palpable subcutaneous masses  Psychiatric: mood and affect intact                     speech and thought processes seem appropriate     The note was completed using Dragon voice recognition transcription. Every effort was made to ensure accuracy; however, inadvertent  transcription errors may be present despite my best efforts to edit errors.

## 2020-02-04 ENCOUNTER — OUTSIDE SERVICES (OUTPATIENT)
Dept: FAMILY MEDICINE CLINIC | Age: 80
End: 2020-02-04
Payer: MEDICARE

## 2020-02-04 VITALS
RESPIRATION RATE: 18 BRPM | TEMPERATURE: 98.1 F | WEIGHT: 162.2 LBS | SYSTOLIC BLOOD PRESSURE: 126 MMHG | HEART RATE: 92 BPM | BODY MASS INDEX: 27.84 KG/M2 | DIASTOLIC BLOOD PRESSURE: 75 MMHG | OXYGEN SATURATION: 93 %

## 2020-02-04 PROCEDURE — 99309 SBSQ NF CARE MODERATE MDM 30: CPT | Performed by: FAMILY MEDICINE

## 2020-02-04 NOTE — PROGRESS NOTES
[facility] PROGRESS NOTE    Patient: Marlene Orr     : 1940   Date of service:  2020    Status: nonskilled    Assessment and plan   Diagnosis Orders   1. Late onset Alzheimer's disease without behavioral disturbance ([facility] [address]     2. Type 2 diabetes mellitus without complication, without long-term current use of insulin ([facility] [address]     3. Arthritis     4. ROMERO (nonalcoholic steatohepatitis)     5. Panlobular emphysema ([address]     6. Primary insomnia       All chronic problems evaluated today are stable or controlled unless noted above. Monthly orders have been reviewed and signed. Continue all current medications and treatment plan as outlined in orders. Subjective  Patient seen at the [facility] facility for routine nursing home followup and for reevaluation of her medical problems. Nursing reports there is been no new problems. She is now back on the [facility]. There is been no behavioral disturbances. Her blood sugars and blood pressure have been within adequate range. No increased shortness of breath. There is been no joint swelling. Active problem list: see below    Review of systems:  Negative for fever or chilling  Negative for cough wheeze or apparent shortness of breath  Negative for vomiting or diarrhea    Objective  Vitals 9118   SYSTOLIC 339   DIASTOLIC 75   Pulse 92   Temp 98.1   Resp 18   SpO2 93   Weight 162 lb 3.2 oz   Height -   BMI (wt*703/ht~2) -   Pain Level -   Some recent data might be hidden      Patient is pleasant but quite confused. She is in no distress  Neck: no masses, thyroid nodules or thyromegaly, and trachea is midline  Lungs: no distress, breath sounds are equal              Clear to auscultation  Heart: Regular rhythm with no murmur, rub or gallop. No JVD. Pretibial pitting edema - none. Carlos Eduardo Miller MD    The note was completed using Dragon voice recognition transcription.  Every effort was made to ensure accuracy; however, inadvertent  transcription errors may be present despite my best efforts to edit errors.     Patient Active Problem List    Diagnosis Date Noted    Late onset Alzheimer's disease without behavioral disturbance ([facility] [address] 06/13/2016     Priority: High    Type 2 diabetes mellitus without complication, without long-term current use of insulin ([address] 09/01/2015     Priority: High    Primary insomnia 10/16/2019     Priority: Medium    ROMERO (nonalcoholic steatohepatitis) 03/21/2017     Priority: Medium    Arthritis 02/04/2016     Priority: Medium    Panlobular emphysema ([address] 11/03/2011     Priority: Medium    Cerebral microvasculopathy 04/19/2018    Osteoarthritis of right AC (acromioclavicular) joint 01/25/2018    Shoulder impingement, right 01/25/2018    Chronic migraine without aura without status migrainosus, not intractable 09/21/2016    Cerebral infarction ([address] 09/01/2015    Noncompliance 03/11/2015    PVD (peripheral vascular disease) ([address] 05/13/2013    Lumbar disc disease 07/26/2012    Seasonal allergic rhinitis due to pollen 05/16/2010    Vitamin D deficiency 03/24/2010    Hypercholesteremia

## 2020-03-03 ENCOUNTER — OUTSIDE SERVICES (OUTPATIENT)
Dept: FAMILY MEDICINE CLINIC | Age: 80
End: 2020-03-03
Payer: MEDICARE

## 2020-03-03 VITALS
TEMPERATURE: 97.6 F | RESPIRATION RATE: 18 BRPM | BODY MASS INDEX: 27.88 KG/M2 | DIASTOLIC BLOOD PRESSURE: 76 MMHG | OXYGEN SATURATION: 98 % | HEART RATE: 78 BPM | WEIGHT: 162.4 LBS | SYSTOLIC BLOOD PRESSURE: 151 MMHG

## 2020-03-03 PROCEDURE — 99309 SBSQ NF CARE MODERATE MDM 30: CPT | Performed by: FAMILY MEDICINE

## 2020-03-03 NOTE — PROGRESS NOTES
[address] [facility] PROGRESS NOTE    Patient: Gerald Barber     : 1940   Date of service:  3/3/2020    Status: nonskilled    Assessment and plan   Diagnosis Orders   1. Late onset Alzheimer's disease without behavioral disturbance ([facility] [address]     2. Type 2 diabetes mellitus without complication, without long-term current use of insulin ([facility])     3. Panlobular emphysema ([facility] [address]     4. Arthritis       All chronic problems evaluated today are stable or controlled unless noted above. Monthly orders have been reviewed and signed. Continue all current medications and treatment plan as outlined in orders. Subjective  Patient seen at the [facility] facility for routine nursing home followup and for reevaluation of her medical problems. Nursing reports no new issues. There is been no behavioral disturbances. Blood sugars reported to not be an issue. There is been no shortness of breath. Patient has not complained of pain. Active problem list: see below    Review of systems:  Negative for fever or chills  Negative for cough or chest congestion  Negative for vomiting or diarrhea    Objective  Vitals 59   SYSTOLIC 343   DIASTOLIC 76   Pulse 78   Temp 97.6   Resp 18   SpO2 98   Weight 162 lb 6.4 oz   Height -   BMI (wt*703/ht~2) -   Pain Level -   Some recent data might be hidden      Patient is quite pleasant and cooperative but is completely confused. Neck: no masses, thyroid nodules or thyromegaly, and trachea is midline  Lungs: no distress, breath sounds are equal              Clear to auscultation  Heart: Regular rhythm with no murmur, rub or gallop. No JVD. Terry Pineda MD    The note was completed using Dragon voice recognition transcription. Every effort was made to ensure accuracy; however, inadvertent  transcription errors may be present despite my best efforts to edit errors.     Patient Active Problem List    Diagnosis Date Noted    Late onset Alzheimer's disease without behavioral disturbance ([facility] [address] 06/13/2016     Priority: High    Type 2 diabetes mellitus without complication, without long-term current use of insulin ([facility] 09/01/2015     Priority: High    Primary insomnia 10/16/2019     Priority: Medium    ROMERO (nonalcoholic steatohepatitis) 03/21/2017     Priority: Medium    Arthritis 02/04/2016     Priority: Medium    Panlobular emphysema ([facility] 11/03/2011     Priority: Medium    Cerebral microvasculopathy 04/19/2018    Osteoarthritis of right AC (acromioclavicular) joint 01/25/2018    Shoulder impingement, right 01/25/2018    Chronic migraine without aura without status migrainosus, not intractable 09/21/2016    Cerebral infarction ([facility] [address] 09/01/2015    Noncompliance 03/11/2015    PVD (peripheral vascular disease) ([facility] 05/13/2013    Lumbar disc disease 07/26/2012    Seasonal allergic rhinitis due to pollen 05/16/2010    Vitamin D deficiency 03/24/2010    Hypercholesteremia

## 2020-04-03 VITALS
TEMPERATURE: 97.7 F | DIASTOLIC BLOOD PRESSURE: 64 MMHG | WEIGHT: 161.6 LBS | RESPIRATION RATE: 16 BRPM | OXYGEN SATURATION: 94 % | BODY MASS INDEX: 27.74 KG/M2 | HEART RATE: 64 BPM | SYSTOLIC BLOOD PRESSURE: 111 MMHG

## 2020-04-07 ENCOUNTER — OUTSIDE SERVICES (OUTPATIENT)
Dept: FAMILY MEDICINE CLINIC | Age: 80
End: 2020-04-07
Payer: MEDICARE

## 2020-04-07 PROCEDURE — 99308 SBSQ NF CARE LOW MDM 20: CPT | Performed by: FAMILY MEDICINE

## 2020-04-07 NOTE — PROGRESS NOTES
[address] [facility] PROGRESS NOTE    Patient: Ibeth Garcia     : 1940   Date of service:  2020    Status: nonskilled    Assessment and plan   Diagnosis Orders   1. Type 2 diabetes mellitus without complication, without long-term current use of insulin ([address]     2. Late onset Alzheimer's disease without behavioral disturbance ([address]     3. Panlobular emphysema ([address]       All chronic problems evaluated today are stable or controlled unless noted above. Monthly orders have been reviewed and signed. Continue all current medications and treatment plan as outlined in orders. Subjective  Patient seen at the [facility] facility for routine nursing home followup and for reevaluation of her medical problems. Nursing reports no new issues. They report her sugars been stable. There is been no behavioral disturbance or significant change in her cognitive function. No apparent shortness of breath    Active problem list: see below      Objective  Vitals 3/7/8632   SYSTOLIC 447   DIASTOLIC 64   Pulse 64   Temp 97.7   Resp 16   SpO2 94   Weight 161 lb 9.6 oz   Height -   BMI (wt*703/ht~2) -   Pain Level -   Some recent data might be hidden      Patient alert pleasant and cooperative but is quite confused  Lungs: no distress, breath sounds are equal              Clear to auscultation  Heart: Regular rhythm with no murmur, rub or gallop. No JVD. Pretibial pitting edema - none. Miguel Suggs MD    The note was completed using Dragon voice recognition transcription. Every effort was made to ensure accuracy; however, inadvertent  transcription errors may be present despite my best efforts to edit errors.     Patient Active Problem List    Diagnosis Date Noted    Late onset Alzheimer's disease without behavioral disturbance ([facility] [address] 2016     Priority: High    Type 2 diabetes mellitus without complication, without long-term current use of insulin ([address] 2015     Priority: High  Primary insomnia 10/16/2019     Priority: Medium    ROMERO (nonalcoholic steatohepatitis) 03/21/2017     Priority: Medium    Arthritis 02/04/2016     Priority: Medium    Panlobular emphysema ([facility] [address] 11/03/2011     Priority: Medium    Cerebral microvasculopathy 04/19/2018    Osteoarthritis of right AC (acromioclavicular) joint 01/25/2018    Shoulder impingement, right 01/25/2018    Chronic migraine without aura without status migrainosus, not intractable 09/21/2016    Cerebral infarction ([facility] [address] 09/01/2015    Noncompliance 03/11/2015    PVD (peripheral vascular disease) ([facility] [address] 05/13/2013    Lumbar disc disease 07/26/2012    Seasonal allergic rhinitis due to pollen 05/16/2010    Vitamin D deficiency 03/24/2010    Hypercholesteremia

## 2020-05-05 ENCOUNTER — OUTSIDE SERVICES (OUTPATIENT)
Dept: FAMILY MEDICINE CLINIC | Age: 80
End: 2020-05-05
Payer: MEDICARE

## 2020-05-05 PROCEDURE — 99309 SBSQ NF CARE MODERATE MDM 30: CPT | Performed by: FAMILY MEDICINE

## 2022-04-01 LAB — VALPROIC ACID LEVEL: 36.9 UG/ML (ref 50–100)

## 2022-08-17 ENCOUNTER — APPOINTMENT (OUTPATIENT)
Dept: GENERAL RADIOLOGY | Age: 82
DRG: 871 | End: 2022-08-17
Payer: MEDICARE

## 2022-08-17 ENCOUNTER — HOSPITAL ENCOUNTER (INPATIENT)
Age: 82
LOS: 13 days | Discharge: HOSPICE | DRG: 871 | End: 2022-08-30
Attending: STUDENT IN AN ORGANIZED HEALTH CARE EDUCATION/TRAINING PROGRAM | Admitting: INTERNAL MEDICINE
Payer: MEDICARE

## 2022-08-17 ENCOUNTER — APPOINTMENT (OUTPATIENT)
Dept: INTERVENTIONAL RADIOLOGY/VASCULAR | Age: 82
DRG: 871 | End: 2022-08-17
Payer: MEDICARE

## 2022-08-17 ENCOUNTER — APPOINTMENT (OUTPATIENT)
Dept: CT IMAGING | Age: 82
DRG: 871 | End: 2022-08-17
Payer: MEDICARE

## 2022-08-17 DIAGNOSIS — I48.91 ATRIAL FIBRILLATION WITH RVR (HCC): ICD-10-CM

## 2022-08-17 DIAGNOSIS — J96.01 ACUTE RESPIRATORY FAILURE WITH HYPOXIA (HCC): ICD-10-CM

## 2022-08-17 DIAGNOSIS — R65.21 SEPTIC SHOCK (HCC): Primary | ICD-10-CM

## 2022-08-17 DIAGNOSIS — N17.9 AKI (ACUTE KIDNEY INJURY) (HCC): ICD-10-CM

## 2022-08-17 DIAGNOSIS — J18.9 MULTIFOCAL PNEUMONIA: ICD-10-CM

## 2022-08-17 DIAGNOSIS — A41.9 SEPTIC SHOCK (HCC): Primary | ICD-10-CM

## 2022-08-17 LAB
A/G RATIO: 0.5 (ref 1.1–2.2)
ACETAMINOPHEN LEVEL: 10 UG/ML (ref 10–30)
ALBUMIN SERPL-MCNC: 1.9 G/DL (ref 3.4–5)
ALP BLD-CCNC: 94 U/L (ref 40–129)
ALT SERPL-CCNC: 13 U/L (ref 10–40)
AMMONIA: 24 UMOL/L (ref 11–51)
AMPHETAMINE SCREEN, URINE: NORMAL
ANION GAP SERPL CALCULATED.3IONS-SCNC: 17 MMOL/L (ref 3–16)
ANTI-XA UNFRAC HEPARIN: 0.26 IU/ML (ref 0.3–0.7)
APTT: 24.1 SEC (ref 23–34.3)
AST SERPL-CCNC: 23 U/L (ref 15–37)
BACTERIA: ABNORMAL /HPF
BANDED NEUTROPHILS RELATIVE PERCENT: 6 % (ref 0–7)
BARBITURATE SCREEN URINE: NORMAL
BASE EXCESS ARTERIAL: 0.3 MMOL/L (ref -3–3)
BASOPHILS ABSOLUTE: 0 K/UL (ref 0–0.2)
BASOPHILS RELATIVE PERCENT: 0 %
BENZODIAZEPINE SCREEN, URINE: NORMAL
BILIRUB SERPL-MCNC: 1.2 MG/DL (ref 0–1)
BILIRUBIN URINE: ABNORMAL
BLOOD, URINE: ABNORMAL
BUN BLDV-MCNC: 72 MG/DL (ref 7–20)
CALCIUM SERPL-MCNC: 9.9 MG/DL (ref 8.3–10.6)
CANNABINOID SCREEN URINE: NORMAL
CARBOXYHEMOGLOBIN ARTERIAL: 0.4 % (ref 0–1.5)
CHLORIDE BLD-SCNC: 106 MMOL/L (ref 99–110)
CLARITY: CLEAR
CO2: 24 MMOL/L (ref 21–32)
COCAINE METABOLITE SCREEN URINE: NORMAL
COLOR: YELLOW
CREAT SERPL-MCNC: 2 MG/DL (ref 0.6–1.2)
EKG ATRIAL RATE: 160 BPM
EKG ATRIAL RATE: 174 BPM
EKG ATRIAL RATE: 97 BPM
EKG DIAGNOSIS: NORMAL
EKG P AXIS: 107 DEGREES
EKG P-R INTERVAL: 164 MS
EKG Q-T INTERVAL: 268 MS
EKG Q-T INTERVAL: 294 MS
EKG Q-T INTERVAL: 294 MS
EKG QRS DURATION: 68 MS
EKG QRS DURATION: 68 MS
EKG QRS DURATION: 72 MS
EKG QTC CALCULATION (BAZETT): 373 MS
EKG QTC CALCULATION (BAZETT): 448 MS
EKG QTC CALCULATION (BAZETT): 479 MS
EKG R AXIS: -3 DEGREES
EKG R AXIS: 76 DEGREES
EKG R AXIS: 77 DEGREES
EKG T AXIS: 186 DEGREES
EKG T AXIS: 198 DEGREES
EKG T AXIS: 66 DEGREES
EKG VENTRICULAR RATE: 160 BPM
EKG VENTRICULAR RATE: 168 BPM
EKG VENTRICULAR RATE: 97 BPM
EOSINOPHILS ABSOLUTE: 0 K/UL (ref 0–0.6)
EOSINOPHILS RELATIVE PERCENT: 0 %
EPITHELIAL CELLS, UA: ABNORMAL /HPF (ref 0–5)
ETHANOL: NORMAL MG/DL (ref 0–0.08)
GFR AFRICAN AMERICAN: 29
GFR NON-AFRICAN AMERICAN: 24
GLUCOSE BLD-MCNC: 147 MG/DL (ref 70–99)
GLUCOSE BLD-MCNC: 217 MG/DL (ref 70–99)
GLUCOSE BLD-MCNC: 296 MG/DL (ref 70–99)
GLUCOSE URINE: 100 MG/DL
HCO3 ARTERIAL: 24.3 MMOL/L (ref 21–29)
HCT VFR BLD CALC: 40 % (ref 36–48)
HEMOGLOBIN, ART, EXTENDED: 13.3 G/DL (ref 12–16)
HEMOGLOBIN: 13.1 G/DL (ref 12–16)
INFLUENZA A: NOT DETECTED
INFLUENZA B: NOT DETECTED
INR BLD: 1.24 (ref 0.87–1.14)
KETONES, URINE: ABNORMAL MG/DL
LACTIC ACID, SEPSIS: 7.2 MMOL/L (ref 0.4–1.9)
LACTIC ACID: 4.8 MMOL/L (ref 0.4–2)
LEUKOCYTE ESTERASE, URINE: NEGATIVE
LIPASE: 17 U/L (ref 13–60)
LYMPHOCYTES ABSOLUTE: 1.1 K/UL (ref 1–5.1)
LYMPHOCYTES RELATIVE PERCENT: 6 %
Lab: NORMAL
MAGNESIUM: 2.4 MG/DL (ref 1.8–2.4)
MCH RBC QN AUTO: 32 PG (ref 26–34)
MCHC RBC AUTO-ENTMCNC: 32.7 G/DL (ref 31–36)
MCV RBC AUTO: 97.8 FL (ref 80–100)
METHADONE SCREEN, URINE: NORMAL
METHEMOGLOBIN ARTERIAL: 0.1 %
MICROSCOPIC EXAMINATION: YES
MONOCYTES ABSOLUTE: 0 K/UL (ref 0–1.3)
MONOCYTES RELATIVE PERCENT: 0 %
MUCUS: ABNORMAL /LPF
NEUTROPHILS ABSOLUTE: 16.8 K/UL (ref 1.7–7.7)
NEUTROPHILS RELATIVE PERCENT: 88 %
NITRITE, URINE: POSITIVE
O2 SAT, ARTERIAL: 94.2 %
O2 THERAPY: ABNORMAL
OPIATE SCREEN URINE: NORMAL
OXYCODONE URINE: NORMAL
PCO2 ARTERIAL: 37.3 MMHG (ref 35–45)
PDW BLD-RTO: 19.2 % (ref 12.4–15.4)
PERFORMED ON: ABNORMAL
PERFORMED ON: ABNORMAL
PH ARTERIAL: 7.43 (ref 7.35–7.45)
PH UA: 5
PH UA: 5 (ref 5–8)
PHENCYCLIDINE SCREEN URINE: NORMAL
PLATELET # BLD: 213 K/UL (ref 135–450)
PLATELET SLIDE REVIEW: ADEQUATE
PMV BLD AUTO: 7.1 FL (ref 5–10.5)
PO2 ARTERIAL: 68.1 MMHG (ref 75–108)
POTASSIUM REFLEX MAGNESIUM: 3.7 MMOL/L (ref 3.5–5.1)
PRO-BNP: 6132 PG/ML (ref 0–449)
PROCALCITONIN: 2.3 NG/ML (ref 0–0.15)
PROPOXYPHENE SCREEN: NORMAL
PROTEIN UA: ABNORMAL MG/DL
PROTHROMBIN TIME: 15.5 SEC (ref 11.7–14.5)
RBC # BLD: 4.09 M/UL (ref 4–5.2)
RBC UA: ABNORMAL /HPF (ref 0–4)
SALICYLATE, SERUM: <0.3 MG/DL (ref 15–30)
SARS-COV-2 RNA, RT PCR: NOT DETECTED
SLIDE REVIEW: ABNORMAL
SODIUM BLD-SCNC: 147 MMOL/L (ref 136–145)
SPECIFIC GRAVITY UA: >=1.03 (ref 1–1.03)
TCO2 ARTERIAL: 25.5 MMOL/L
TOTAL PROTEIN: 5.9 G/DL (ref 6.4–8.2)
TROPONIN: 0.01 NG/ML
TROPONIN: 0.03 NG/ML
TSH REFLEX FT4: 1.35 UIU/ML (ref 0.27–4.2)
URINE TYPE: ABNORMAL
UROBILINOGEN, URINE: >=8 E.U./DL
WBC # BLD: 17.9 K/UL (ref 4–11)
WBC UA: ABNORMAL /HPF (ref 0–5)

## 2022-08-17 PROCEDURE — 80053 COMPREHEN METABOLIC PANEL: CPT

## 2022-08-17 PROCEDURE — 99285 EMERGENCY DEPT VISIT HI MDM: CPT

## 2022-08-17 PROCEDURE — 2580000003 HC RX 258: Performed by: NURSE PRACTITIONER

## 2022-08-17 PROCEDURE — 2500000003 HC RX 250 WO HCPCS: Performed by: NURSE PRACTITIONER

## 2022-08-17 PROCEDURE — 6370000000 HC RX 637 (ALT 250 FOR IP): Performed by: NURSE PRACTITIONER

## 2022-08-17 PROCEDURE — 96375 TX/PRO/DX INJ NEW DRUG ADDON: CPT

## 2022-08-17 PROCEDURE — 82140 ASSAY OF AMMONIA: CPT

## 2022-08-17 PROCEDURE — 70450 CT HEAD/BRAIN W/O DYE: CPT

## 2022-08-17 PROCEDURE — 84478 ASSAY OF TRIGLYCERIDES: CPT

## 2022-08-17 PROCEDURE — 96365 THER/PROPH/DIAG IV INF INIT: CPT

## 2022-08-17 PROCEDURE — 87040 BLOOD CULTURE FOR BACTERIA: CPT

## 2022-08-17 PROCEDURE — 36592 COLLECT BLOOD FROM PICC: CPT

## 2022-08-17 PROCEDURE — 6370000000 HC RX 637 (ALT 250 FOR IP): Performed by: INTERNAL MEDICINE

## 2022-08-17 PROCEDURE — 87449 NOS EACH ORGANISM AG IA: CPT

## 2022-08-17 PROCEDURE — 83880 ASSAY OF NATRIURETIC PEPTIDE: CPT

## 2022-08-17 PROCEDURE — 87070 CULTURE OTHR SPECIMN AEROBIC: CPT

## 2022-08-17 PROCEDURE — 87186 SC STD MICRODIL/AGAR DIL: CPT

## 2022-08-17 PROCEDURE — 93010 ELECTROCARDIOGRAM REPORT: CPT | Performed by: INTERNAL MEDICINE

## 2022-08-17 PROCEDURE — 83735 ASSAY OF MAGNESIUM: CPT

## 2022-08-17 PROCEDURE — 84443 ASSAY THYROID STIM HORMONE: CPT

## 2022-08-17 PROCEDURE — 87077 CULTURE AEROBIC IDENTIFY: CPT

## 2022-08-17 PROCEDURE — 87641 MR-STAPH DNA AMP PROBE: CPT

## 2022-08-17 PROCEDURE — 72125 CT NECK SPINE W/O DYE: CPT

## 2022-08-17 PROCEDURE — 5A1945Z RESPIRATORY VENTILATION, 24-96 CONSECUTIVE HOURS: ICD-10-PCS | Performed by: INTERNAL MEDICINE

## 2022-08-17 PROCEDURE — 80143 DRUG ASSAY ACETAMINOPHEN: CPT

## 2022-08-17 PROCEDURE — 85730 THROMBOPLASTIN TIME PARTIAL: CPT

## 2022-08-17 PROCEDURE — 2500000003 HC RX 250 WO HCPCS: Performed by: STUDENT IN AN ORGANIZED HEALTH CARE EDUCATION/TRAINING PROGRAM

## 2022-08-17 PROCEDURE — 94761 N-INVAS EAR/PLS OXIMETRY MLT: CPT

## 2022-08-17 PROCEDURE — 71045 X-RAY EXAM CHEST 1 VIEW: CPT

## 2022-08-17 PROCEDURE — 71260 CT THORAX DX C+: CPT | Performed by: STUDENT IN AN ORGANIZED HEALTH CARE EDUCATION/TRAINING PROGRAM

## 2022-08-17 PROCEDURE — 6360000002 HC RX W HCPCS: Performed by: STUDENT IN AN ORGANIZED HEALTH CARE EDUCATION/TRAINING PROGRAM

## 2022-08-17 PROCEDURE — 36415 COLL VENOUS BLD VENIPUNCTURE: CPT

## 2022-08-17 PROCEDURE — 2000000000 HC ICU R&B

## 2022-08-17 PROCEDURE — 2580000003 HC RX 258: Performed by: STUDENT IN AN ORGANIZED HEALTH CARE EDUCATION/TRAINING PROGRAM

## 2022-08-17 PROCEDURE — 82803 BLOOD GASES ANY COMBINATION: CPT

## 2022-08-17 PROCEDURE — 6360000002 HC RX W HCPCS: Performed by: INTERNAL MEDICINE

## 2022-08-17 PROCEDURE — 83605 ASSAY OF LACTIC ACID: CPT

## 2022-08-17 PROCEDURE — 83690 ASSAY OF LIPASE: CPT

## 2022-08-17 PROCEDURE — 87205 SMEAR GRAM STAIN: CPT

## 2022-08-17 PROCEDURE — 82077 ASSAY SPEC XCP UR&BREATH IA: CPT

## 2022-08-17 PROCEDURE — 84145 PROCALCITONIN (PCT): CPT

## 2022-08-17 PROCEDURE — 84484 ASSAY OF TROPONIN QUANT: CPT

## 2022-08-17 PROCEDURE — 6360000004 HC RX CONTRAST MEDICATION: Performed by: STUDENT IN AN ORGANIZED HEALTH CARE EDUCATION/TRAINING PROGRAM

## 2022-08-17 PROCEDURE — 85610 PROTHROMBIN TIME: CPT

## 2022-08-17 PROCEDURE — A4216 STERILE WATER/SALINE, 10 ML: HCPCS | Performed by: INTERNAL MEDICINE

## 2022-08-17 PROCEDURE — 6360000002 HC RX W HCPCS: Performed by: NURSE PRACTITIONER

## 2022-08-17 PROCEDURE — 87636 SARSCOV2 & INF A&B AMP PRB: CPT

## 2022-08-17 PROCEDURE — 94640 AIRWAY INHALATION TREATMENT: CPT

## 2022-08-17 PROCEDURE — 2580000003 HC RX 258: Performed by: INTERNAL MEDICINE

## 2022-08-17 PROCEDURE — 93005 ELECTROCARDIOGRAM TRACING: CPT | Performed by: STUDENT IN AN ORGANIZED HEALTH CARE EDUCATION/TRAINING PROGRAM

## 2022-08-17 PROCEDURE — 74177 CT ABD & PELVIS W/CONTRAST: CPT

## 2022-08-17 PROCEDURE — 85520 HEPARIN ASSAY: CPT

## 2022-08-17 PROCEDURE — 93005 ELECTROCARDIOGRAM TRACING: CPT | Performed by: INTERNAL MEDICINE

## 2022-08-17 PROCEDURE — 2500000003 HC RX 250 WO HCPCS: Performed by: INTERNAL MEDICINE

## 2022-08-17 PROCEDURE — 80307 DRUG TEST PRSMV CHEM ANLYZR: CPT

## 2022-08-17 PROCEDURE — 2700000000 HC OXYGEN THERAPY PER DAY

## 2022-08-17 PROCEDURE — 81001 URINALYSIS AUTO W/SCOPE: CPT

## 2022-08-17 PROCEDURE — 85025 COMPLETE CBC W/AUTO DIFF WBC: CPT

## 2022-08-17 PROCEDURE — 94002 VENT MGMT INPAT INIT DAY: CPT

## 2022-08-17 PROCEDURE — 80179 DRUG ASSAY SALICYLATE: CPT

## 2022-08-17 PROCEDURE — 99291 CRITICAL CARE FIRST HOUR: CPT | Performed by: INTERNAL MEDICINE

## 2022-08-17 RX ORDER — SODIUM CHLORIDE 0.9 % (FLUSH) 0.9 %
5-40 SYRINGE (ML) INJECTION PRN
Status: DISCONTINUED | OUTPATIENT
Start: 2022-08-17 | End: 2022-08-17

## 2022-08-17 RX ORDER — SODIUM CHLORIDE 0.9 % (FLUSH) 0.9 %
5-40 SYRINGE (ML) INJECTION EVERY 12 HOURS SCHEDULED
Status: DISCONTINUED | OUTPATIENT
Start: 2022-08-17 | End: 2022-08-17

## 2022-08-17 RX ORDER — HEPARIN SODIUM 1000 [USP'U]/ML
1800 INJECTION, SOLUTION INTRAVENOUS; SUBCUTANEOUS PRN
Status: DISCONTINUED | OUTPATIENT
Start: 2022-08-17 | End: 2022-08-21

## 2022-08-17 RX ORDER — SODIUM CHLORIDE, SODIUM LACTATE, POTASSIUM CHLORIDE, CALCIUM CHLORIDE 600; 310; 30; 20 MG/100ML; MG/100ML; MG/100ML; MG/100ML
INJECTION, SOLUTION INTRAVENOUS CONTINUOUS
Status: ACTIVE | OUTPATIENT
Start: 2022-08-17 | End: 2022-08-18

## 2022-08-17 RX ORDER — HEPARIN SODIUM 1000 [USP'U]/ML
3600 INJECTION, SOLUTION INTRAVENOUS; SUBCUTANEOUS ONCE
Status: COMPLETED | OUTPATIENT
Start: 2022-08-17 | End: 2022-08-17

## 2022-08-17 RX ORDER — SODIUM CHLORIDE 9 MG/ML
25 INJECTION, SOLUTION INTRAVENOUS PRN
Status: DISCONTINUED | OUTPATIENT
Start: 2022-08-17 | End: 2022-08-17

## 2022-08-17 RX ORDER — ACETAMINOPHEN 650 MG/1
650 SUPPOSITORY RECTAL EVERY 6 HOURS PRN
Status: DISCONTINUED | OUTPATIENT
Start: 2022-08-17 | End: 2022-08-30 | Stop reason: HOSPADM

## 2022-08-17 RX ORDER — LIDOCAINE HYDROCHLORIDE 10 MG/ML
5 INJECTION, SOLUTION INFILTRATION; PERINEURAL ONCE
Status: DISCONTINUED | OUTPATIENT
Start: 2022-08-17 | End: 2022-08-17

## 2022-08-17 RX ORDER — HEPARIN SODIUM 1000 [USP'U]/ML
1800 INJECTION, SOLUTION INTRAVENOUS; SUBCUTANEOUS ONCE
Status: COMPLETED | OUTPATIENT
Start: 2022-08-17 | End: 2022-08-17

## 2022-08-17 RX ORDER — ONDANSETRON 2 MG/ML
4 INJECTION INTRAMUSCULAR; INTRAVENOUS EVERY 6 HOURS PRN
Status: DISCONTINUED | OUTPATIENT
Start: 2022-08-17 | End: 2022-08-30 | Stop reason: HOSPADM

## 2022-08-17 RX ORDER — HEPARIN SODIUM 10000 [USP'U]/100ML
950 INJECTION, SOLUTION INTRAVENOUS CONTINUOUS
Status: DISCONTINUED | OUTPATIENT
Start: 2022-08-17 | End: 2022-08-21

## 2022-08-17 RX ORDER — SODIUM CHLORIDE, SODIUM LACTATE, POTASSIUM CHLORIDE, CALCIUM CHLORIDE 600; 310; 30; 20 MG/100ML; MG/100ML; MG/100ML; MG/100ML
1000 INJECTION, SOLUTION INTRAVENOUS CONTINUOUS
Status: DISCONTINUED | OUTPATIENT
Start: 2022-08-17 | End: 2022-08-17 | Stop reason: SDUPTHER

## 2022-08-17 RX ORDER — DIGOXIN 0.25 MG/ML
250 INJECTION INTRAMUSCULAR; INTRAVENOUS DAILY
Status: DISCONTINUED | OUTPATIENT
Start: 2022-08-17 | End: 2022-08-18

## 2022-08-17 RX ORDER — INSULIN LISPRO 100 [IU]/ML
0-4 INJECTION, SOLUTION INTRAVENOUS; SUBCUTANEOUS EVERY 4 HOURS
Status: DISCONTINUED | OUTPATIENT
Start: 2022-08-17 | End: 2022-08-30 | Stop reason: HOSPADM

## 2022-08-17 RX ORDER — IPRATROPIUM BROMIDE AND ALBUTEROL SULFATE 2.5; .5 MG/3ML; MG/3ML
1 SOLUTION RESPIRATORY (INHALATION) EVERY 4 HOURS PRN
Status: DISCONTINUED | OUTPATIENT
Start: 2022-08-17 | End: 2022-08-20

## 2022-08-17 RX ORDER — PROPOFOL 10 MG/ML
10 INJECTION, EMULSION INTRAVENOUS CONTINUOUS PRN
Status: DISCONTINUED | OUTPATIENT
Start: 2022-08-17 | End: 2022-08-20 | Stop reason: ALTCHOICE

## 2022-08-17 RX ORDER — HEPARIN SODIUM 1000 [USP'U]/ML
3600 INJECTION, SOLUTION INTRAVENOUS; SUBCUTANEOUS PRN
Status: DISCONTINUED | OUTPATIENT
Start: 2022-08-17 | End: 2022-08-21

## 2022-08-17 RX ORDER — FENTANYL CITRATE-0.9 % NACL/PF 10 MCG/ML
25-200 PLASTIC BAG, INJECTION (ML) INTRAVENOUS CONTINUOUS
Status: DISCONTINUED | OUTPATIENT
Start: 2022-08-17 | End: 2022-08-20 | Stop reason: ALTCHOICE

## 2022-08-17 RX ORDER — IPRATROPIUM BROMIDE AND ALBUTEROL SULFATE 2.5; .5 MG/3ML; MG/3ML
1 SOLUTION RESPIRATORY (INHALATION)
Status: DISCONTINUED | OUTPATIENT
Start: 2022-08-17 | End: 2022-08-20

## 2022-08-17 RX ORDER — PROPOFOL 10 MG/ML
5-50 INJECTION, EMULSION INTRAVENOUS CONTINUOUS
Status: DISCONTINUED | OUTPATIENT
Start: 2022-08-17 | End: 2022-08-20 | Stop reason: ALTCHOICE

## 2022-08-17 RX ORDER — SODIUM CHLORIDE 0.9 % (FLUSH) 0.9 %
5-40 SYRINGE (ML) INJECTION PRN
Status: DISCONTINUED | OUTPATIENT
Start: 2022-08-17 | End: 2022-08-30 | Stop reason: HOSPADM

## 2022-08-17 RX ORDER — MINERAL OIL AND WHITE PETROLATUM 150; 830 MG/G; MG/G
OINTMENT OPHTHALMIC EVERY 4 HOURS
Status: DISCONTINUED | OUTPATIENT
Start: 2022-08-17 | End: 2022-08-20 | Stop reason: ALTCHOICE

## 2022-08-17 RX ORDER — FENTANYL CITRATE-0.9 % NACL/PF 10 MCG/ML
25-200 PLASTIC BAG, INJECTION (ML) INTRAVENOUS CONTINUOUS
Status: DISCONTINUED | OUTPATIENT
Start: 2022-08-17 | End: 2022-08-17 | Stop reason: SDUPTHER

## 2022-08-17 RX ORDER — CARBOXYMETHYLCELLULOSE SODIUM 10 MG/ML
1 GEL OPHTHALMIC EVERY 4 HOURS
Status: DISCONTINUED | OUTPATIENT
Start: 2022-08-17 | End: 2022-08-20 | Stop reason: ALTCHOICE

## 2022-08-17 RX ORDER — SODIUM CHLORIDE 9 MG/ML
INJECTION, SOLUTION INTRAVENOUS PRN
Status: DISCONTINUED | OUTPATIENT
Start: 2022-08-17 | End: 2022-08-30 | Stop reason: HOSPADM

## 2022-08-17 RX ORDER — ALBUTEROL SULFATE 90 UG/1
4 AEROSOL, METERED RESPIRATORY (INHALATION) EVERY 4 HOURS PRN
Status: DISCONTINUED | OUTPATIENT
Start: 2022-08-17 | End: 2022-08-17

## 2022-08-17 RX ORDER — ADENOSINE 3 MG/ML
INJECTION, SOLUTION INTRAVENOUS
Status: DISCONTINUED
Start: 2022-08-17 | End: 2022-08-17

## 2022-08-17 RX ORDER — POLYETHYLENE GLYCOL 3350 17 G/17G
17 POWDER, FOR SOLUTION ORAL DAILY PRN
Status: DISCONTINUED | OUTPATIENT
Start: 2022-08-17 | End: 2022-08-30 | Stop reason: HOSPADM

## 2022-08-17 RX ORDER — ONDANSETRON 4 MG/1
4 TABLET, ORALLY DISINTEGRATING ORAL EVERY 8 HOURS PRN
Status: DISCONTINUED | OUTPATIENT
Start: 2022-08-17 | End: 2022-08-30 | Stop reason: HOSPADM

## 2022-08-17 RX ORDER — FENTANYL CITRATE-0.9 % NACL/PF 20 MCG/2ML
50 SYRINGE (ML) INTRAVENOUS EVERY 30 MIN PRN
Status: DISCONTINUED | OUTPATIENT
Start: 2022-08-17 | End: 2022-08-17 | Stop reason: SDUPTHER

## 2022-08-17 RX ORDER — ACETAMINOPHEN 325 MG/1
650 TABLET ORAL EVERY 6 HOURS PRN
Status: DISCONTINUED | OUTPATIENT
Start: 2022-08-17 | End: 2022-08-30 | Stop reason: HOSPADM

## 2022-08-17 RX ORDER — CHLORHEXIDINE GLUCONATE 0.12 MG/ML
15 RINSE ORAL 2 TIMES DAILY
Status: DISCONTINUED | OUTPATIENT
Start: 2022-08-17 | End: 2022-08-20 | Stop reason: ALTCHOICE

## 2022-08-17 RX ORDER — IPRATROPIUM BROMIDE AND ALBUTEROL SULFATE 2.5; .5 MG/3ML; MG/3ML
1 SOLUTION RESPIRATORY (INHALATION) 4 TIMES DAILY
Status: DISCONTINUED | OUTPATIENT
Start: 2022-08-17 | End: 2022-08-17

## 2022-08-17 RX ORDER — SODIUM CHLORIDE 9 MG/ML
INJECTION, SOLUTION INTRAVENOUS CONTINUOUS
Status: DISCONTINUED | OUTPATIENT
Start: 2022-08-17 | End: 2022-08-17

## 2022-08-17 RX ORDER — SODIUM CHLORIDE 0.9 % (FLUSH) 0.9 %
5-40 SYRINGE (ML) INJECTION EVERY 12 HOURS SCHEDULED
Status: DISCONTINUED | OUTPATIENT
Start: 2022-08-17 | End: 2022-08-30 | Stop reason: HOSPADM

## 2022-08-17 RX ORDER — FENTANYL CITRATE-0.9 % NACL/PF 20 MCG/2ML
50 SYRINGE (ML) INTRAVENOUS EVERY 30 MIN PRN
Status: DISCONTINUED | OUTPATIENT
Start: 2022-08-17 | End: 2022-08-21

## 2022-08-17 RX ADMIN — AMIODARONE HYDROCHLORIDE 0.5 MG/MIN: 50 INJECTION, SOLUTION INTRAVENOUS at 21:26

## 2022-08-17 RX ADMIN — CEFEPIME HYDROCHLORIDE 1000 MG: 1 INJECTION, POWDER, FOR SOLUTION INTRAMUSCULAR; INTRAVENOUS at 16:35

## 2022-08-17 RX ADMIN — PROPOFOL 20 MCG/KG/MIN: 10 INJECTION, EMULSION INTRAVENOUS at 13:36

## 2022-08-17 RX ADMIN — MUPIROCIN: 20 OINTMENT TOPICAL at 20:55

## 2022-08-17 RX ADMIN — VASOPRESSIN 0.03 UNITS/MIN: 20 INJECTION PARENTERAL at 23:17

## 2022-08-17 RX ADMIN — VANCOMYCIN HYDROCHLORIDE 1250 MG: 10 INJECTION, POWDER, LYOPHILIZED, FOR SOLUTION INTRAVENOUS at 14:38

## 2022-08-17 RX ADMIN — SODIUM CHLORIDE, POTASSIUM CHLORIDE, SODIUM LACTATE AND CALCIUM CHLORIDE: 600; 310; 30; 20 INJECTION, SOLUTION INTRAVENOUS at 18:47

## 2022-08-17 RX ADMIN — INSULIN LISPRO 2 UNITS: 100 INJECTION, SOLUTION INTRAVENOUS; SUBCUTANEOUS at 20:55

## 2022-08-17 RX ADMIN — HEPARIN SODIUM 1800 UNITS: 1000 INJECTION INTRAVENOUS; SUBCUTANEOUS at 22:17

## 2022-08-17 RX ADMIN — DIGOXIN 250 MCG: 0.25 INJECTION INTRAMUSCULAR; INTRAVENOUS at 14:28

## 2022-08-17 RX ADMIN — WHITE PETROLATUM 57.7 %-MINERAL OIL 31.9 % EYE OINTMENT: at 20:54

## 2022-08-17 RX ADMIN — NOREPINEPHRINE BITARTRATE 50 MCG/MIN: 1 INJECTION INTRAVENOUS at 12:36

## 2022-08-17 RX ADMIN — VASOPRESSIN 0.03 UNITS/MIN: 20 INJECTION PARENTERAL at 18:12

## 2022-08-17 RX ADMIN — HEPARIN SODIUM 710 UNITS/HR: 10000 INJECTION, SOLUTION INTRAVENOUS at 16:59

## 2022-08-17 RX ADMIN — FAMOTIDINE 20 MG: 10 INJECTION INTRAVENOUS at 20:55

## 2022-08-17 RX ADMIN — NOREPINEPHRINE BITARTRATE 14 MCG/MIN: 1 INJECTION INTRAVENOUS at 23:16

## 2022-08-17 RX ADMIN — Medication 15 ML: at 20:54

## 2022-08-17 RX ADMIN — Medication 10 ML: at 20:59

## 2022-08-17 RX ADMIN — HEPARIN SODIUM 3600 UNITS: 1000 INJECTION INTRAVENOUS; SUBCUTANEOUS at 16:53

## 2022-08-17 RX ADMIN — IOPAMIDOL 85 ML: 755 INJECTION, SOLUTION INTRAVENOUS at 13:06

## 2022-08-17 RX ADMIN — FENTANYL CITRATE 50 MCG/HR: 50 INJECTION INTRAVENOUS at 13:22

## 2022-08-17 RX ADMIN — IPRATROPIUM BROMIDE AND ALBUTEROL SULFATE 1 AMPULE: 2.5; .5 SOLUTION RESPIRATORY (INHALATION) at 23:41

## 2022-08-17 RX ADMIN — DEXTROSE 1 MG/MIN: 5 SOLUTION INTRAVENOUS at 14:02

## 2022-08-17 RX ADMIN — SODIUM CHLORIDE, POTASSIUM CHLORIDE, SODIUM LACTATE AND CALCIUM CHLORIDE 1000 ML: 600; 310; 30; 20 INJECTION, SOLUTION INTRAVENOUS at 15:55

## 2022-08-17 RX ADMIN — VASOPRESSIN 0.03 UNITS/MIN: 20 INJECTION PARENTERAL at 14:16

## 2022-08-17 RX ADMIN — CARBOXYMETHYLCELLULOSE SODIUM 1 DROP: 10 GEL OPHTHALMIC at 22:19

## 2022-08-17 RX ADMIN — HYDROCORTISONE SODIUM SUCCINATE 50 MG: 100 INJECTION, POWDER, FOR SOLUTION INTRAMUSCULAR; INTRAVENOUS at 21:00

## 2022-08-17 RX ADMIN — INSULIN LISPRO 1 UNITS: 100 INJECTION, SOLUTION INTRAVENOUS; SUBCUTANEOUS at 16:47

## 2022-08-17 RX ADMIN — PIPERACILLIN AND TAZOBACTAM 3375 MG: 3; .375 INJECTION, POWDER, FOR SOLUTION INTRAVENOUS at 14:26

## 2022-08-17 RX ADMIN — SODIUM CHLORIDE: 9 INJECTION, SOLUTION INTRAVENOUS at 16:34

## 2022-08-17 RX ADMIN — DEXTROSE MONOHYDRATE 150 MG: 5 INJECTION INTRAVENOUS at 13:48

## 2022-08-17 ASSESSMENT — PULMONARY FUNCTION TESTS
PIF_VALUE: 21
PIF_VALUE: 19
PIF_VALUE: 20
PIF_VALUE: 27
PIF_VALUE: 32
PIF_VALUE: 31
PIF_VALUE: 28
PIF_VALUE: 27
PIF_VALUE: 21
PIF_VALUE: 21
PIF_VALUE: 19
PIF_VALUE: 27
PIF_VALUE: 27
PIF_VALUE: 20
PIF_VALUE: 27
PIF_VALUE: 21
PIF_VALUE: 27
PIF_VALUE: 27
PIF_VALUE: 22
PIF_VALUE: 21
PIF_VALUE: 23
PIF_VALUE: 19

## 2022-08-17 NOTE — PROGRESS NOTES
8. 17.2022/Patient Frantz Calvin was unresponsive. Talked and prayed by bedside of patient. Supportive presence for family   08/17/22 1427   Encounter Summary   Encounter Overview/Reason  Initial Encounter;Crisis; Spiritual/Emotional Needs   Service Provided For: Patient and family together   Support System Children;Family members   Last Encounter  08/17/22   Complexity of Encounter High   Begin Time 1355   End Time  1435   Total Time Calculated 40 min   Encounter    Type Family Care   Spiritual/Emotional needs   Type Spiritual Support;Spiritual Distress   Grief, Loss, and Adjustments   Type Anticipatory Grief   Assessment/Intervention/Outcome   Assessment Unable to assess   Intervention Active listening;End of Life Care;Grief Care;Sustaining Presence/Ministry of presence;Prayer (assurance of)/[place];Read/Provided Scripture  ( called to meet with family.   led a prayer in patient's room.)   Outcome Did not respond;Grieving;Expressed Gratitude;Engaged in conversation  (Supportive presence for family)

## 2022-08-17 NOTE — ED NOTES
Pt brought to CT at this time with respiratory at bedside. On portable vent & monitor.       Marvin Woody RN  08/17/22 9231

## 2022-08-17 NOTE — ED PROVIDER NOTES
Emergency Department Encounter    Patient: Renetta Ibrahim  MRN: [id number]  : 1940  Date of Evaluation: 2022  ED Provider:  Dina Garcia MD    Triage Chief Complaint:   Respiratory Distress (EMS states that pt is usually confused. EMS states that pts O2 sat were in the 70s'. EMS administered ketamine and rocc. Pt is intubated )    [place]:  Renetta Ibrahim is a 80 y.o. female presenting from nursing facility intubated by EMS for hypoxic respiratory failure. EMS state the patient has a history of dementia and is baseline confused. States they were called as patient was acting more confused than baseline and when they arrived patient was satting in the 70s on room air. Patient was hypotensive for them as well. Patient was started on Levophed, fluids and intubated by EMS. On presentation the ED patient is intubated with bilateral breath sounds, patient is on 38 of Levophed and has received 2 L of fluids by EMS. Patient is on high vent settings and oxygen saturations will intermittently drop into the 80s. Patient was intubated with ketamine and rosalia. Patient has coarse lung sounds diffusely with diminished lung sounds in the left lung.   History is very limited otherwise secondary to patient's clinical status    ROS - see HPI, below listed is current ROS at time of my eval:  Unable to obtain full review of systems secondary to patient clinical status    Past Medical History:   Diagnosis Date    Allergic rhinitis     CTS (carpal tunnel syndrome)     Diabetes     Hyperlipidemia     Hypertension     Lactic acidosis     due to metformin    OA (osteoarthritis)     Stroke [facility])     Memory     Past Surgical History:   Procedure Laterality Date    BREAST BIOPSY      CARPAL TUNNEL RELEASE      CATARACT REMOVAL WITH IMPLANT  13    right    EYE SURGERY  12    cataract with implant Left Eye    HYSTERECTOMY (CERVIX STATUS UNKNOWN)      LUMBAR LAMINECTOMY      SHOULDER ARTHROSCOPY      LEFT KIKO AND BSO (CERVIX REMOVED)       Family History   Problem Relation Age of Onset    Arthritis Mother     Cancer Mother         leukemia    Diabetes Mother     Heart Disease Mother     Arthritis Father     Arthritis Sister     Other Sister         Muscular dystrophy    Arthritis Brother     High Blood Pressure Brother      Social History     Socioeconomic History    Marital status:      Spouse name: Not on file    Number of children: Not on file    Years of education: Not on file    Highest education level: Not on file   Occupational History    Not on file   Tobacco Use    Smoking status: Former     Packs/day: 2.00     Years: 15.00     Pack years: 30.00     Types: Cigarettes     Quit date: 2013     Years since quittin.3    Smokeless tobacco: Never   Vaping Use    Vaping Use: Never used   Substance and Sexual Activity    Alcohol use: No    Drug use: No    Sexual activity: Not Currently   Other Topics Concern    Not on file   Social History Narrative    Not on file     Social Determinants of Health     Financial Resource Strain: Not on file   Food Insecurity: Not on file   Transportation Needs: Not on file   Physical Activity: Not on file   Stress: Not on file   Social Connections: Not on file   Intimate Partner Violence: Not on file   Housing Stability: Not on file     Current Facility-Administered Medications   Medication Dose Route Frequency Provider Last Rate Last Admin    norepinephrine (LEVOPHED) 16 mg in dextrose 5 % 250 mL infusion  1-100 mcg/min IntraVENous Continuous Emma Gutierrez MD 46.9 mL/hr at 22 1236 50 mcg/min at 22 1236    vasopressin 20 Units in dextrose 5 % 100 mL infusion  0.01-0.03 Units/min IntraVENous Continuous Emma Gutierrez MD        lidocaine 1 % injection 5 mL  5 mL IntraDERmal Once Emma Gutierrez MD        sodium chloride flush 0.9 % injection 5-40 mL  5-40 mL IntraVENous 2 times per day Emma Gutierrez MD        sodium chloride flush 0.9 % injection 5-40 mL  5-40 mL IntraVENous PRN Rica Barton MD        0.9 % sodium chloride infusion  25 mL IntraVENous PRN Rica Barton MD        fentaNYL (SUBLIMAZE) 1,000 mcg in sodium chloride 0.9% 100 mL infusion   mcg/hr IntraVENous Continuous Rica Barton MD 5 mL/hr at 08/17/22 1322 50 mcg/hr at 08/17/22 1322    And    fentaNYL bolus from bag  50 mcg IntraVENous Q30 Min PRN Rica Barton MD        piperacillin-tazobactam (ZOSYN) 3,375 mg in sodium chloride 0.9 % 100 mL IVPB (mini-bag)  3,375 mg IntraVENous Once Rica Barton MD        propofol injection  5-50 mcg/kg/min IntraVENous Continuous Rica Barton MD 7.1 mL/hr at 08/17/22 1336 20 mcg/kg/min at 08/17/22 1336    vancomycin (VANCOCIN) 1,250 mg in dextrose 5 % 250 mL IVPB  1,250 mg IntraVENous Once Rica Barton MD        adenosine (ADENOCARD) 6 MG/2ML injection             amiodarone (CORDARONE) 150 mg in dextrose 5 % 100 mL bolus  150 mg IntraVENous Once Rica Barton  mL/hr at 08/17/22 1348 150 mg at 08/17/22 1348    Followed by    amiodarone (CORDARONE) 450 mg in dextrose 5 % 250 mL infusion  1 mg/min IntraVENous Continuous Rica Barton MD        Followed by    amiodarone (CORDARONE) 450 mg in dextrose 5 % 250 mL infusion  0.5 mg/min IntraVENous Continuous Rica Barton MD         Current Outpatient Medications   Medication Sig Dispense Refill    SITagliptin (JANUVIA) 100 MG tablet Take 1 tablet by mouth daily 1 tablet 0    montelukast (SINGULAIR) 10 MG tablet TAKE ONE TABLET BY MOUTH DAILY 30 tablet 3    traZODone (DESYREL) 100 MG tablet Take 1 tablet by mouth nightly 30 tablet 2    Polyethylene Glycol 3350 (MIRALAX PO) Take by mouth      Aspirin-Acetaminophen-Caffeine (EXCEDRIN MIGRAINE PO) Take 2 tablets by mouth daily       Allergies   Allergen Reactions    Avandia [Rosiglitazone Maleate]     Codeine      Per son Codeine caused patient to go into cardiac arrest    Glucophage [Metformin Hydrochloride]      Lactic acidosis      Hydrochlorothiazide     Tramadol        Nursing Notes Reviewed    Physical Exam:  Triage VS:    ED Triage Vitals   Enc Vitals Group      BP 08/17/22 1219 126/74      Heart Rate 08/17/22 1219 99      Resp 08/17/22 1219 20      Temp --       Temp src --       SpO2 08/17/22 1219 100 %      Weight 08/17/22 1312 131 lb (59.4 kg)      Height 08/17/22 1312 5' 6\" (1.676 m)      Head Circumference --       Peak Flow --       Pain Score --       Pain Loc --       Pain Edu? --       Excl. in [address]? --        My pulse ox interpretation is - normal    General appearance: GCS 3 T on presentation  Skin:  . Dry. Eye: Pupils 3 mm reactive bilaterally  Ears, nose, mouth and throat:  Oral mucosa dry  Neck:  Trachea midline. Extremity:  No swelling. No erythema, fluctuance, purulence, no signs of septic joint  Heart:  Regular rate and rhythm, normal S1 & S2, no extra heart sounds. Perfusion:  intact  Respiratory: Intubated and sedated, diffuse rhonchi, decreased air movement on the left  Abdominal:  Normal bowel sounds. Soft. Nontender. Non distended. Back: No obvious step-offs or deformities along spine  Neurological: Patient is intubated and has just received rocuronium.   GCS 3 T on presentation, pupils are 3 mm and reactive bilaterally          Psychiatric: Unable to assess    I have reviewed and interpreted all of the currently available lab results from this visit (if applicable):  Results for orders placed or performed during the hospital encounter of 08/17/22   CBC with Auto Differential   Result Value Ref Range    WBC 17.9 (H) 4.0 - 11.0 K/uL    RBC 4.09 4.00 - 5.20 M/uL    Hemoglobin 13.1 12.0 - 16.0 g/dL    Hematocrit 40.0 36.0 - 48.0 %    MCV 97.8 80.0 - 100.0 fL    MCH 32.0 26.0 - 34.0 pg    MCHC 32.7 31.0 - 36.0 g/dL    RDW 19.2 (H) 12.4 - 15.4 %    Platelets 569 548 - 421 K/uL    MPV 7.1 5.0 - 10.5 fL    PLATELET SLIDE REVIEW Adequate     SLIDE REVIEW see below Neutrophils % 88.0 %    Lymphocytes % 6.0 %    Monocytes % 0.0 %    Eosinophils % 0.0 %    Basophils % 0.0 %    Neutrophils Absolute 16.8 (H) 1.7 - 7.7 K/uL    Lymphocytes Absolute 1.1 1.0 - 5.1 K/uL    Monocytes Absolute 0.0 0.0 - 1.3 K/uL    Eosinophils Absolute 0.0 0.0 - 0.6 K/uL    Basophils Absolute 0.0 0.0 - 0.2 K/uL    Bands Relative 6 0 - 7 %   Comprehensive Metabolic Panel w/ Reflex to MG   Result Value Ref Range    Sodium 147 (H) 136 - 145 mmol/L    Potassium reflex Magnesium 3.7 3.5 - 5.1 mmol/L    Chloride 106 99 - 110 mmol/L    CO2 24 21 - 32 mmol/L    Anion Gap 17 (H) 3 - 16    Glucose 147 (H) 70 - 99 mg/dL    BUN 72 (H) 7 - 20 mg/dL    Creatinine 2.0 (H) 0.6 - 1.2 mg/dL    GFR Non-African American 24 (A) >60    GFR  29 (A) >60    Calcium 9.9 8.3 - 10.6 mg/dL    Total Protein 5.9 (L) 6.4 - 8.2 g/dL    Albumin 1.9 (L) 3.4 - 5.0 g/dL    Albumin/Globulin Ratio 0.5 (L) 1.1 - 2.2    Total Bilirubin 1.2 (H) 0.0 - 1.0 mg/dL    Alkaline Phosphatase 94 40 - 129 U/L    ALT 13 10 - 40 U/L    AST 23 15 - 37 U/L   Troponin   Result Value Ref Range    Troponin 0.01 <0.01 ng/mL   Blood gas, arterial   Result Value Ref Range    pH, Arterial 7.432 7.350 - 7.450    pCO2, Arterial 37.3 35.0 - 45.0 mmHg    pO2, Arterial 68.1 (L) 75.0 - 108.0 mmHg    HCO3, Arterial 24.3 21.0 - 29.0 mmol/L    Base Excess, Arterial 0.3 -3.0 - 3.0 mmol/L    Hemoglobin, Art, Extended 13.3 12.0 - 16.0 g/dL    O2 Sat, Arterial 94.2 >92 %    Carboxyhgb, Arterial 0.4 0.0 - 1.5 %    Methemoglobin, Arterial 0.1 <1.5 %    TCO2, Arterial 25.5 Not Established mmol/L    O2 Therapy Unknown    Brain Natriuretic Peptide   Result Value Ref Range    Pro-BNP 6,132 (H) 0 - 449 pg/mL   Lactic Acid   Result Value Ref Range    Lactic Acid 4.8 (HH) 0.4 - 2.0 mmol/L   Lipase   Result Value Ref Range    Lipase 17.0 13.0 - 60.0 U/L   Urinalysis with Microscopic   Result Value Ref Range    Color, UA Yellow Straw/Yellow    Clarity, UA Clear Clear Glucose, Ur 100 (A) Negative mg/dL    Bilirubin Urine MODERATE (A) Negative    Ketones, Urine TRACE (A) Negative mg/dL    Specific Gravity, UA >=1.030 1.005 - 1.030    Blood, Urine MODERATE (A) Negative    pH, UA 5.0 5.0 - 8.0    Protein, UA TRACE (A) Negative mg/dL    Urobilinogen, Urine >=8.0 (A) <2.0 E.U./dL    Nitrite, Urine POSITIVE (A) Negative    Leukocyte Esterase, Urine Negative Negative    Microscopic Examination YES     Urine Type NotGiven     Mucus, UA 1+ (A) None Seen /LPF    WBC, UA 6-9 (A) 0 - 5 /HPF    RBC, UA 5-10 (A) 0 - 4 /HPF    Epithelial Cells, UA 2-5 0 - 5 /HPF    Bacteria, UA 1+ (A) None Seen /HPF   Ammonia   Result Value Ref Range    Ammonia 24 11 - 51 umol/L   Drug screen multi urine   Result Value Ref Range    Amphetamine Screen, Urine Neg Negative <1000ng/mL    Barbiturate Screen, Ur Neg Negative <200 ng/mL    Benzodiazepine Screen, Urine Neg Negative <200 ng/mL    Cannabinoid Scrn, Ur Neg Negative <50 ng/mL    Cocaine Metabolite Screen, Urine Neg Negative <300 ng/mL    Opiate Scrn, Ur Neg Negative <300 ng/mL    PCP Screen, Urine Neg Negative <25 ng/mL    Methadone Screen, Urine Neg Negative <300 ng/mL    Propoxyphene Scrn, Ur Neg Negative <300 ng/mL    Oxycodone Urine Neg Negative <100 ng/ml    pH, UA 5.0     Drug Screen Comment: see below    Ethanol   Result Value Ref Range    Ethanol Lvl None Detected mg/dL   Salicylate   Result Value Ref Range    Salicylate, Serum <8.5 (L) 15.0 - 30.0 mg/dL   Acetaminophen Level   Result Value Ref Range    Acetaminophen Level 10 10 - 30 ug/mL   EKG 12 Lead   Result Value Ref Range    Ventricular Rate 97 BPM    Atrial Rate 97 BPM    P-R Interval 164 ms    QRS Duration 68 ms    Q-T Interval 294 ms    QTc Calculation (Bazett) 373 ms    P Axis 107 degrees    R Axis -3 degrees    T Axis 66 degrees    Diagnosis       Normal sinus rhythmLow voltage QRSNonspecific T wave abnormalityAbnormal ECGWhen compared with ECG of 09-FEB-2019 15:44,Questionable change in QRS axisNonspecific T wave abnormality no longer evident in Anterior leadsNonspecific T wave abnormality, worse in Lateral leadsQT has shortened        Radiographs (if obtained):  Radiologist's Report Reviewed:  CT HEAD WO CONTRAST    Result Date: 8/17/2022  EXAMINATION: CT OF THE HEAD WITHOUT CONTRAST  8/17/2022 12:44 pm TECHNIQUE: CT of the head was performed without the administration of intravenous contrast. Automated exposure control, iterative reconstruction, and/or weight based adjustment of the mA/kV was utilized to reduce the radiation dose to as low as reasonably achievable. COMPARISON: 11/30/2018 HISTORY: ORDERING SYSTEM PROVIDED HISTORY: ams TECHNOLOGIST PROVIDED HISTORY: Reason for exam:->ams Has a \"code stroke\" or \"stroke alert\" been called? ->No Decision Support Exception - unselect if not a suspected or confirmed emergency medical condition->Emergency Medical Condition (MA) Reason for Exam: AMS FINDINGS: BRAIN/VENTRICLES: There is no acute intracranial hemorrhage, mass effect or midline shift. No abnormal extra-axial fluid collection. The gray-white differentiation is maintained without evidence of an acute infarct. There is no evidence of hydrocephalus. Severe chronic microvascular ischemic change. ORBITS: The visualized portion of the orbits demonstrate no acute abnormality. SINUSES: The visualized paranasal sinuses and mastoid air cells demonstrate no acute abnormality. SOFT TISSUES/SKULL:  No acute abnormality of the visualized skull or soft tissues. No acute intracranial abnormality. CT ABDOMEN PELVIS W IV CONTRAST Additional Contrast? None    Result Date: 8/17/2022  EXAMINATION: CTA OF THE CHEST; CT OF THE ABDOMEN AND PELVIS WITH CONTRAST 8/17/2022 12:45 pm TECHNIQUE: CTA of the chest was performed after the administration of intravenous contrast.  Multiplanar reformatted images are provided for review. MIP images are provided for review.  Automated exposure control, iterative reconstruction, and/or weight based adjustment of the mA/kV was utilized to reduce the radiation dose to as low as reasonably achievable.; CT of the abdomen and pelvis was performed with the administration of intravenous contrast. Multiplanar reformatted images are provided for review. Automated exposure control, iterative reconstruction, and/or weight based adjustment of the mA/kV was utilized to reduce the radiation dose to as low as reasonably achievable. COMPARISON: 06/07/2016 CT HISTORY: ORDERING SYSTEM PROVIDED HISTORY: sob, hypoxia, intubated by ems TECHNOLOGIST PROVIDED HISTORY: Reason for exam:->sob, hypoxia, intubated by ems Decision Support Exception - unselect if not a suspected or confirmed emergency medical condition->Emergency Medical Condition (MA) Reason for Exam: Respiratory Distress (EMS states that pt is usually confused. EMS states that pts O2 sat were in the 70s'. EMS administered ketamine and rocc. Pt is intubated ; ORDERING SYSTEM PROVIDED HISTORY: hypoxia, intubated by ems TECHNOLOGIST PROVIDED HISTORY: Reason for exam:->hypoxia, intubated by ems Additional Contrast?->None Decision Support Exception - unselect if not a suspected or confirmed emergency medical condition->Emergency Medical Condition (MA) Reason for Exam: Respiratory Distress (EMS states that pt is usually confused. EMS states that pts O2 sat were in the 70s'. EMS administered ketamine and rocc. Pt is intubated FINDINGS: Chest: Pulmonary arteries: Study is of good technical quality for evaluation of pulmonary embolism. There are no filling defects to suggest pulmonary embolism. Main pulmonary artery is normal in caliber. No evidence of right ventricular strain. Mediastinum: Heart size is normal.  Mild pericardial effusion present. Diffuse atherosclerotic plaque and calcification of the thoracic aorta. No aneurysm or dissection. Thyroid gland is normal.  Enteric tube in the esophagus extending into the lumen of the stomach. Mild mediastinal and hilar lymph node enlargement within index right paratracheal lymph node measuring 16 mm. Lungs/pleura: The airways are patent. Endotracheal tube positioned appropriately no pleural fluid or pneumothorax. Dense airspace opacification throughout the left lower lobe with additional dense opacities scattered in the left upper lobe and right lower lobe. Soft Tissues/Bones: No skeletal abnormalities throughout the chest. Abdomen/Pelvis: Organs: The gallbladder is severely distended. No mucosal thickening, pericholecystic fluid or biliary dilatation. No intraluminal filling defects are demonstrated. The liver is normal.  There are calcified granulomata throughout the spleen. Numerous simple appearing cysts are scattered throughout the pancreatic parenchyma measuring up to 1.6 cm in the tail. 1.2 cm myelolipoma in the right adrenal gland. The left adrenal gland shows mild hyperplasia. Kidneys are normal. GI/Bowel: The stomach, duodenum and small bowel are normal. A normal appendix is visualized. The colon is normal. Pelvis: Bladder is decompressed around a Eckert catheter. The uterus is not visualized. Peritoneum/Retroperitoneum: Diffuse atherosclerotic plaque and calcification of the aorta. Approximately 70% stenosis is noted distally, just below the level of the renal arteries. No lymphadenopathy. Bones/Soft Tissues: No significant skeletal abnormalities. 1. No pulmonary embolism. 2. Multifocal airspace opacities most prominent in the left lower lobe. Pattern likely represents developing pneumonia. ARDS may be considered. 3. Distended gallbladder with no pattern of inflammation or biliary dilatation. This may be due to fasting status or other systemic processes described above. 4. Multiple cystic lesions within the pancreas that have developed or are increased from a prior study in 2016. Recommend follow-up imaging in 2 years to ensure stability.  5. Advanced atherosclerotic changes measuring up to 1.6 cm in the tail. 1.2 cm myelolipoma in the right adrenal gland. The left adrenal gland shows mild hyperplasia. Kidneys are normal. GI/Bowel: The stomach, duodenum and small bowel are normal. A normal appendix is visualized. The colon is normal. Pelvis: Bladder is decompressed around a Eckert catheter. The uterus is not visualized. Peritoneum/Retroperitoneum: Diffuse atherosclerotic plaque and calcification of the aorta. Approximately 70% stenosis is noted distally, just below the level of the renal arteries. No lymphadenopathy. Bones/Soft Tissues: No significant skeletal abnormalities. 1. No pulmonary embolism. 2. Multifocal airspace opacities most prominent in the left lower lobe. Pattern likely represents developing pneumonia. ARDS may be considered. 3. Distended gallbladder with no pattern of inflammation or biliary dilatation. This may be due to fasting status or other systemic processes described above. 4. Multiple cystic lesions within the pancreas that have developed or are increased from a prior study in 2016. Recommend follow-up imaging in 2 years to ensure stability. 5. Advanced atherosclerotic changes of the aorta in the lower abdomen with stenosis noted without occlusion. Procedure Note:  The [employer] was placed due to  emergency necessity. Timeout performed prior to procedure. Yadira Barragan was prepped and draped in standard bedside fashion in the right femoralarea. Physical landmarks with ultrasound guidance was used to locate the central vein. Local anesthesia with 5ml of 1% lidocaine was injected. Seldinger technique was used for placement of the CVC in a non-pulsatile vasculature. All ports mika and flushed. The patient tolerated the procedure well and no acute complications occurred.     Comment: Please note this report has been produced using speech recognition software and may contain errors related to that system including errors in grammar, punctuation, and spelling, as well as words and phrases that may be inappropriate. If there are any questions or concerns please feel free to contact the dictating provider for clarification. EKG (if obtained): (All EKG's are interpreted by myself in the absence of a cardiologist)  Normal sinus rhythm, ventricular rate 97, MN interval 164, QRS duration 68, QTc 372, there is low voltage QRS as well as motion artifact which does limit evaluation but no significant ST elevation or depression seen    MDM:    80-year-old female presenting with hypoxic respiratory failure. Patient had been intubated sedated and on Levophed on presentation by EMS. History can be seen above. Patient has received 2 L in route via EMS. Patient is on 45 of Levophed by EMS. Patient's initial blood pressure is 126/74 patient is afebrile with heart rate within normal limits. On presentation patient is intubated with bilateral breath sounds. Patient does have palpable central pulses patient is GCS 3 T. Patient just been given rocuronium and intubated by EMS patient stabilized in the ED with vasopressors and fluids and taken for CT. CT head is nonacute. CT spine C-spine shows no acute fractures or dislocations. CT chest abdomen pelvis obtained showing no pulmonary embolism. There is multifocal airspace opacities most prominent in the left lower lobe likely representing developing pneumonia distended gallbladder with no pattern of inflammation or biliary dilation. There is multiple cystic lesions within the pancreas that have developed or are increased from prior exam in 2016. Tomi Hardwick Patient empirically given antibiotics. Chest x-ray concerning for multifocal pneumonia. Patient's oxygen saturations intermittently drop into the 80s even on high vent support. We will gently hydrate at this time as I do not want to significantly overload patient at this time.   Blood pressure did drop with maps in the 50s and patient increased to 50 of Levophed and started vasopressin in the ED with improvement of maps and started to wean off Levophed. EKG can be seen above. Patient's heart rate then did increase to 160s and 170s. Repeat EKG revealed A. fib with RVR. Patient's blood pressure remained stable on current vasopressor dosing. Patient was given an amiodarone bolus and drip and I did discuss with cardiology who agrees with amiodarone and suggest adding digoxin as well. States he would not shock patient at this point given limited history and concerned that patient will return back into A. fib and that we need to address the primary cause which is septic shock secondary to pneumonia. Bedside echo reveals no significant pericardial effusion, fast is negative. CBC reveals a leukocytosis of 18,000. Hemoglobin is within normal limits. Patient is hyper natremia, 147. Patient does have an NEELIMA with creatinine of 2.0 with a baseline of 0.8. Initial troponin is nonelevated. ABG reveals a pH of 7.43 with a PCO2 of 37 PaO2 is 68 on 70% FiO2 and 80 of PEEP. BNP is elevated 6000. Blood cultures are ordered and pending. Lipase is nonelevated. UA shows positive nitrites with positive bacteria. Ammonia is not elevated. TSH is within normal limits. Ethanol is nonelevated, UDS is negative. Discussed with ICU doc as well as hospital service and patient will be admitted to the ICU for further evaluation and treatment. Diagnosis:    1. Septic shock ([facility] [address]    2. Multifocal pneumonia    3. Acute respiratory failure with hypoxia (HCC)    4. NEELIMA (acute kidney injury) ([facility] [address]    5. Atrial fibrillation with RVR ([facility] [address]          Total critical care time provided today was 32 minutes. This excludes seperately billable procedures and family discussion time. Critical care time provided for obtaining history, conducting a physical exam, performing and monitoring interventions, ordering, collecting and interpreting tests, and establishing medical decision-making. There was a potential for life/limb threatening pathology requiring close evaluation and intervention with concern for patient decompensation. Comment: Please note this report has been produced using speech recognition software and may contain errors related to that system including errors in grammar, punctuation, and spelling, as well as words and phrases that may be inappropriate. Efforts were made to edit the dictations.        Herber Vogel MD  08/19/22 [id number]       Herber Vogel MD  08/19/22 2220

## 2022-08-17 NOTE — CARE COORDINATION
Case Management Assessment  Initial Evaluation      Patient Name: Jason Watson  YOB: 1940  Diagnosis: No admission diagnoses are documented for this encounter. Date / Time: 8/17/2022 12:16 PM    Admission status/Date: Inpatient 8/17/2022  Chart Reviewed: Yes      Patient Interviewed: No   Family Interviewed:  Yes - sonAmada, at bedside      Hospitalization in the last 30 days:  No      Health Care Decision Maker :   Primary Decision Maker: Karri Reece - Child - [phone]     Who do you trust or have selected to make healthcare decisions for you    Met with: Son at bedside      Current PCP: per facility    Financial  Medicare and [address] required for SNF : N          3 night stay required - N    ADLS  Support Systems/Care Needs: family  Transportation:  per facility    Meal Preparation: per facility    Housing  Living Arrangements: LTC at [facility]  Steps: n/a   Intent for return to present living arrangements: Yes  Identified Issues: Patient intubated on ventilator    [address]   DME Provider: per facility  Equipment: per facility    Home O2 Use :  No    If No for home O2---if presently on O2 during hospitalization:  Yes  if yes CM to follow for potential DC O2 need  Informed of need for care provider to bring portable home O2 tank on day of discharge for nursing to connect prior to leaving:   Not Indicated  Verbalized agreement/Understanding:   Not Indicated    Community Service Affiliation  Dialysis:  No    Agency:  Location:  Dialysis Schedule:  Phone:   Fax: Other Community Services: n/a     DISCHARGE PLAN: Explained Case Management role/services. CM reviewed chart and met with son, Amada Garcia, at bedside to discuss discharge needs and plan. [facility] [address] Update provided to [address] [place], [place] [address]Latonya at [place] [address] who states she will follow in [address] Patient remains intubated/ventilated in ED. Will be admitted to ICU. CM to follow.     Chel Casanova RN

## 2022-08-17 NOTE — PROGRESS NOTES
Lactic 7.2 called to Dr. Francisco Rivero called back no need to check repeat lactic pt is being treated max support Electronically signed by Lm Valerio RN on 8/17/2022 at 7:01 PM

## 2022-08-17 NOTE — PROGRESS NOTES
08/17/22 1944   Patient Observation   Heart Rate (!) 103   Resp 16   Observations ett 7.5 / 22 @ Lip   Breath Sounds   Right Upper Lobe Diminished   Right Middle Lobe Diminished   Right Lower Lobe Diminished   Left Upper Lobe Diminished   Left Lower Lobe Diminished   Ventilator Settings   Vt (Set, mL) 355 mL   Resp Rate (Set) 20 bmp   PEEP/CPAP (cmH2O) 10   FiO2  80 %   Vent Patient Data (Readings)   Vt (Measured) 377 mL   Peak Inspiratory Pressure (cmH2O) 19 cmH2O   Rate Measured 20 br/min   Minute Volume (L/min) 7.78 Liters   Mean Airway Pressure (cmH2O) 13 cmH20   Plateau Pressure (cm H2O) 19 cm H2O   I:E Ratio 1:2.80   Flow Sensitivity 3 L/min   PEEP Intrinsic (cm H2O) 10 cm H2O   Static Compliance (L/cm H2O) 42   Dynamic Compliance (L/cm H2O) 42 L/cm H2O   Vent Alarm Settings   Low Minute Volume (lpm) 4 L/min   Low Exhaled Vt (ml) 255 mL   RR High (bpm) 45 br/min   Apnea (secs) 20 secs   Additional Respiratoray Assessments   Humidification Source Heated wire   Humidification Temp 36.6   Circuit Condensation Drained   Ambu Bag With Mask At Bedside Yes   Airway Clearance   Suction ET Tube   Suction Device Inline suction catheter   Sputum Method Obtained Endotracheal   Sputum Amount Small   Sputum Color/Odor White   Sputum Consistency Thick   ETT (adult)   Placement Date: 08/17/22   Present on Admission/Arrival: Yes  Placed By: Other (comment)  Airway Tube Size: 7.5 mm  Location: Oral  Secured At: 22 cm  Measured From: Lips   Secured At 22 cm   Measured From Lips   ETT Placement Left   Secured By Commercial tube florian   Site Assessment Dry

## 2022-08-17 NOTE — ACP (ADVANCE CARE PLANNING)
Advance Care Planning     General Advance Care Planning (ACP) Conversation    Date of Conversation: 8/17/2022  Conducted with: Patient with [address] Decision Maker: Next of Kin by law (only applies in absence of above) (name) Abby Perez. Patient has POA document in chart. Unfortunately, page 1 of 12 is missing. Son, Terri Fernandes, believes he has medical POA. Spoke to Fatemeh at [facility] who states they do not have copy of POA paperwork. Healthcare Decision Maker:    Primary Decision Maker: Karri Reece - Child - [phone]  Click here to complete Healthcare Decision Makers including selection of the Healthcare Decision Maker Relationship (ie \"Primary\"). Today we documented Decision Maker(s) consistent with Legal Next of Kin hierarchy.     Content/Action Overview:  Reviewed DNR/DNI and patient elects Full Code (Attempt Resuscitation)      Length of Voluntary ACP Conversation in minutes:  <16 minutes (Non-Billable)    Tg Núñez RN

## 2022-08-17 NOTE — PROGRESS NOTES
Cardiology consult called to answering service, message left on voicemail, 8/17/22 @ 1742-Chandra Leopold Coats

## 2022-08-17 NOTE — CONSULTS
Pulmonary & Critical Care Medicine ICU Consultation Note  Patient is being seen at the request of Dr. Eliel Bee for a consultation for Acute Respiratory Failure     HISTORY OF PRESENT ILLNESS: 81 yo with h/o MONET from ECF with altered mental status and hypoxia and hypotension. EMS gave 1.5 to 2 L fluids and levophed; intubated patient in the field. AFIB RVR in the ED treated with amiodarone and digoxin. Vasopressin added in ED for high oxygen requirement. PAST MEDICAL HISTORY:  Past Medical History:   Diagnosis Date    Allergic rhinitis     CTS (carpal tunnel syndrome)     Diabetes     Hyperlipidemia     Hypertension     Lactic acidosis     due to metformin    OA (osteoarthritis)     Stroke [facility]) 2008    Memory     PAST SURGICAL HISTORY:  Past Surgical History:   Procedure Laterality Date    BREAST BIOPSY      CARPAL TUNNEL RELEASE      CATARACT REMOVAL WITH IMPLANT  1/11/13    right    EYE SURGERY  12/14/12    cataract with implant Left Eye    HYSTERECTOMY (CERVIX STATUS UNKNOWN)      LUMBAR LAMINECTOMY      SHOULDER ARTHROSCOPY      LEFT    KIKO AND BSO (CERVIX REMOVED)         FAMILY HISTORY:  family history includes Arthritis in her brother, father, mother, and sister; Cancer in her mother; Diabetes in her mother; Heart Disease in her mother; High Blood Pressure in her brother; Other in her sister. SOCIAL HISTORY:   reports that she quit smoking about 9 years ago. Her smoking use included cigarettes. She has a 30.00 pack-year smoking history.  She has never used smokeless tobacco.    Scheduled Meds:   lidocaine 1 % injection  5 mL IntraDERmal Once    sodium chloride flush  5-40 mL IntraVENous 2 times per day    vancomycin  1,250 mg IntraVENous Once    adenosine        digoxin  250 mcg IntraVENous Daily     Continuous Infusions:   norepinephrine 50 mcg/min (08/17/22 1432)    vasopressin (Septic Shock) infusion 0.03 Units/min (08/17/22 1416)    sodium chloride      fentaNYL 50 mcg/hr (08/17/22 1322) propofol Stopped (08/17/22 1433)    amiodarone 1 mg/min (08/17/22 1402)    Followed by    amiodarone         REVIEW OF SYSTEMS:  Unable to obtain because the patient is non-communicative     Vascular access: Femoral CVC 8/17/22    MV:  8/17/22     / / /   Recent Labs     08/17/22  1230   PHART 7.432   DTY3EZZ 37.3   PO2ART 68.1*       Blood pressure 115/75, pulse (!) 140, resp. rate 23, height 5' 6\" (1.676 m), weight 131 lb (59.4 kg), SpO2 97 %.'      PHYSICAL EXAM:  General: intubated, ill appearing    Eyes: PERRL. No sclera icterus. No conjunctival injection. ENT: No discharge. Pharynx with ETT. Neck: Trachea midline. Normal thyroid. Resp: No accessory muscle use. No crackles. No wheezing. No rhonchi. No dullness on percussion. CV: Regular rate. Regular rhythm. No mumur or rub. No edema. Peripheral pulses are 2+. Capillary refill is less than 3 seconds. GI: Non-tender. Non-distended. No masses. No organomegaly. Normal bowel sounds. No hernia. Skin: Warm and dry. No nodule on exposed extremities. No rash on exposed extremities. Lymph: No cervical LAD. No supraclavicular LAD. M/S: No cyanosis. No joint deformity. No clubbing. Neuro: Sedated, not following commands. Patellar reflexes are symmetric  Psych: Unable to obtain because the patient is non-communicative    LABS:  CBC:   Recent Labs     08/17/22  1230   WBC 17.9*   HGB 13.1   HCT 40.0   MCV 97.8        BMP:   Recent Labs     08/17/22  1230   *   K 3.7      CO2 24   BUN 72*   CREATININE 2.0*     LIVER PROFILE:   Recent Labs     08/17/22  1230   AST 23   ALT 13   LIPASE 17.0   BILITOT 1.2*   ALKPHOS 94     PT/INR:   Recent Labs     08/17/22  1210   PROTIME 15.5*   INR 1.24*     APTT: No results for input(s): APTT in the last 72 hours.   UA:  Recent Labs     08/17/22  1230   COLORU Yellow   PHUR 5.0  5.0   WBCUA 6-9*   RBCUA 5-10*   MUCUS 1+*   BACTERIA 1+*   CLARITYU Clear   SPECGRAV >=1.030   LEUKOCYTESUR Negative   UROBILINOGEN

## 2022-08-17 NOTE — ED NOTES
[id number]- Cardiology replied with number for Dr. Yeimy Willis to call for consult  [phone]- Dr. Yeimy Willis called cardiology     [place] [facility]  08/17/22 [address] [place] [address]  08/17/22 5781

## 2022-08-17 NOTE — PROGRESS NOTES
Pharmacy to Manage Heparin Infusion per Hospital Policy      Low dose weight based heparin ordered by Dr Darryl Huff in the ER. Pt wt = 59.4 kg (will use adjusted wt if actual body weight > 120% ideal body weight). Baseline aPTT = 24.1 sec at 1452 today. Oral factor Xa-inhibitors may alter and elevate anti-Xa levels used for unfractionated heparin monitoring. As a result, anti-Xa monitoring is not accurate while Xa-inhibitor activity is detectable. Utilize aPTT monitoring when patient received an oral factor Xa-inhibitor (apixaban, betrixaban, edoxaban or rivaroxaban) within 72 hours prior to admission (please document last administration time). The goal is to allow a washout of oral factor Xa-inhibitors by using aPTT for 72 hours, then change to ant-Xa levels for UFH. Heparin (weight-based) Infusion: CAD/STEMI/NSTEMI/UA/AFib)   Heparin 60 units/kg (3600 units) IVP bolus followed by Heparin infusion at 12 units/kg/hr (710 units/hr) (recommended initial max dose 1000 units/hr). Recheck anti-Xa (unless aPTT being used) in 6 hours. Goal anti-Xa 0.3-0.7 IU/mL  Goal aPTT =  seconds.

## 2022-08-17 NOTE — ED NOTES
[address] [place] [address] pulmonology for consult, on call Dr. Francisco Rivero  [phone]- Dr. Francisco Rivero returned call, talked with Dr. Lyndon Durham  08/17/22 [address]  08/17/22 0579

## 2022-08-17 NOTE — PROGRESS NOTES
Admit to ICU  Acute hypoxic respiratory failure  Intubated/on mechanical ventilation  Requiring pressors; Levo, Vaso  Multi-focal pneumonia  Hypernatremia  NEELIMA  Lactic acidosis   Leukocytosis    Dementia, h/o CVA  Hypertension  Hyperlipidemia  DM type 2    [facility]  8/17/2022

## 2022-08-18 ENCOUNTER — APPOINTMENT (OUTPATIENT)
Dept: GENERAL RADIOLOGY | Age: 82
DRG: 871 | End: 2022-08-18
Payer: MEDICARE

## 2022-08-18 PROBLEM — E44.0 MODERATE PROTEIN-CALORIE MALNUTRITION (HCC): Status: ACTIVE | Noted: 2022-08-18

## 2022-08-18 PROBLEM — N17.1 ACUTE RENAL FAILURE WITH ACUTE CORTICAL NECROSIS (HCC): Status: ACTIVE | Noted: 2022-08-18

## 2022-08-18 LAB
A/G RATIO: 0.4 (ref 1.1–2.2)
ALBUMIN SERPL-MCNC: 1.8 G/DL (ref 3.4–5)
ALP BLD-CCNC: 95 U/L (ref 40–129)
ALT SERPL-CCNC: 13 U/L (ref 10–40)
ANION GAP SERPL CALCULATED.3IONS-SCNC: 18 MMOL/L (ref 3–16)
ANISOCYTOSIS: ABNORMAL
ANTI-XA UNFRAC HEPARIN: 0.29 IU/ML (ref 0.3–0.7)
ANTI-XA UNFRAC HEPARIN: 0.4 IU/ML (ref 0.3–0.7)
AST SERPL-CCNC: 18 U/L (ref 15–37)
BANDED NEUTROPHILS RELATIVE PERCENT: 18 % (ref 0–7)
BASE EXCESS ARTERIAL: -0.5 MMOL/L (ref -3–3)
BASOPHILS ABSOLUTE: 0 K/UL (ref 0–0.2)
BASOPHILS RELATIVE PERCENT: 0 %
BILIRUB SERPL-MCNC: 0.8 MG/DL (ref 0–1)
BUN BLDV-MCNC: 75 MG/DL (ref 7–20)
CALCIUM SERPL-MCNC: 9.5 MG/DL (ref 8.3–10.6)
CARBOXYHEMOGLOBIN ARTERIAL: 0.3 % (ref 0–1.5)
CHLORIDE BLD-SCNC: 100 MMOL/L (ref 99–110)
CO2: 20 MMOL/L (ref 21–32)
CREAT SERPL-MCNC: 1.9 MG/DL (ref 0.6–1.2)
EKG ATRIAL RATE: 47 BPM
EKG DIAGNOSIS: NORMAL
EKG P AXIS: 31 DEGREES
EKG P-R INTERVAL: 96 MS
EKG Q-T INTERVAL: 456 MS
EKG QRS DURATION: 54 MS
EKG QTC CALCULATION (BAZETT): 403 MS
EKG R AXIS: 57 DEGREES
EKG T AXIS: 81 DEGREES
EKG VENTRICULAR RATE: 47 BPM
EOSINOPHILS ABSOLUTE: 0 K/UL (ref 0–0.6)
EOSINOPHILS RELATIVE PERCENT: 0 %
GFR AFRICAN AMERICAN: 31
GFR NON-AFRICAN AMERICAN: 25
GLUCOSE BLD-MCNC: 169 MG/DL (ref 70–99)
GLUCOSE BLD-MCNC: 182 MG/DL (ref 70–99)
GLUCOSE BLD-MCNC: 186 MG/DL (ref 70–99)
GLUCOSE BLD-MCNC: 191 MG/DL (ref 70–99)
GLUCOSE BLD-MCNC: 192 MG/DL (ref 70–99)
GLUCOSE BLD-MCNC: 212 MG/DL (ref 70–99)
GLUCOSE BLD-MCNC: 219 MG/DL (ref 70–99)
GLUCOSE BLD-MCNC: 239 MG/DL (ref 70–99)
HCO3 ARTERIAL: 23 MMOL/L (ref 21–29)
HCT VFR BLD CALC: 43.6 % (ref 36–48)
HEMOGLOBIN, ART, EXTENDED: 13.8 G/DL (ref 12–16)
HEMOGLOBIN: 13.7 G/DL (ref 12–16)
L. PNEUMOPHILA SEROGP 1 UR AG: NORMAL
LYMPHOCYTES ABSOLUTE: 0.6 K/UL (ref 1–5.1)
LYMPHOCYTES RELATIVE PERCENT: 3 %
MCH RBC QN AUTO: 31.6 PG (ref 26–34)
MCHC RBC AUTO-ENTMCNC: 31.4 G/DL (ref 31–36)
MCV RBC AUTO: 100.7 FL (ref 80–100)
METHEMOGLOBIN ARTERIAL: 0 %
MONOCYTES ABSOLUTE: 0.2 K/UL (ref 0–1.3)
MONOCYTES RELATIVE PERCENT: 1 %
MRSA SCREEN RT-PCR: NORMAL
NEUTROPHILS ABSOLUTE: 19.2 K/UL (ref 1.7–7.7)
NEUTROPHILS RELATIVE PERCENT: 78 %
NUCLEATED RED BLOOD CELLS: 1 /100 WBC
O2 SAT, ARTERIAL: 97.5 %
O2 THERAPY: ABNORMAL
PCO2 ARTERIAL: 34.7 MMHG (ref 35–45)
PDW BLD-RTO: 19.7 % (ref 12.4–15.4)
PERFORMED ON: ABNORMAL
PH ARTERIAL: 7.44 (ref 7.35–7.45)
PLATELET # BLD: 193 K/UL (ref 135–450)
PLATELET SLIDE REVIEW: ADEQUATE
PMV BLD AUTO: 7.8 FL (ref 5–10.5)
PO2 ARTERIAL: 93.9 MMHG (ref 75–108)
POLYCHROMASIA: ABNORMAL
POTASSIUM REFLEX MAGNESIUM: 4 MMOL/L (ref 3.5–5.1)
RBC # BLD: 4.33 M/UL (ref 4–5.2)
SLIDE REVIEW: ABNORMAL
SODIUM BLD-SCNC: 138 MMOL/L (ref 136–145)
STREP PNEUMONIAE ANTIGEN, URINE: ABNORMAL
TCO2 ARTERIAL: 24.1 MMOL/L
TOTAL PROTEIN: 6.1 G/DL (ref 6.4–8.2)
TRIGL SERPL-MCNC: 193 MG/DL (ref 0–150)
TROPONIN: 0.02 NG/ML
TSH REFLEX FT4: 1.14 UIU/ML (ref 0.27–4.2)
VANCOMYCIN RANDOM: 17 UG/ML
WBC # BLD: 20 K/UL (ref 4–11)

## 2022-08-18 PROCEDURE — 2580000003 HC RX 258: Performed by: NURSE PRACTITIONER

## 2022-08-18 PROCEDURE — 36415 COLL VENOUS BLD VENIPUNCTURE: CPT

## 2022-08-18 PROCEDURE — 6360000002 HC RX W HCPCS: Performed by: INTERNAL MEDICINE

## 2022-08-18 PROCEDURE — 94003 VENT MGMT INPAT SUBQ DAY: CPT

## 2022-08-18 PROCEDURE — 2580000003 HC RX 258: Performed by: INTERNAL MEDICINE

## 2022-08-18 PROCEDURE — 93005 ELECTROCARDIOGRAM TRACING: CPT | Performed by: INTERNAL MEDICINE

## 2022-08-18 PROCEDURE — 87205 SMEAR GRAM STAIN: CPT

## 2022-08-18 PROCEDURE — 71045 X-RAY EXAM CHEST 1 VIEW: CPT

## 2022-08-18 PROCEDURE — 80202 ASSAY OF VANCOMYCIN: CPT

## 2022-08-18 PROCEDURE — 2000000000 HC ICU R&B

## 2022-08-18 PROCEDURE — 99223 1ST HOSP IP/OBS HIGH 75: CPT | Performed by: INTERNAL MEDICINE

## 2022-08-18 PROCEDURE — 2500000003 HC RX 250 WO HCPCS: Performed by: INTERNAL MEDICINE

## 2022-08-18 PROCEDURE — 84443 ASSAY THYROID STIM HORMONE: CPT

## 2022-08-18 PROCEDURE — 85520 HEPARIN ASSAY: CPT

## 2022-08-18 PROCEDURE — 89220 SPUTUM SPECIMEN COLLECTION: CPT

## 2022-08-18 PROCEDURE — 87070 CULTURE OTHR SPECIMN AEROBIC: CPT

## 2022-08-18 PROCEDURE — 80053 COMPREHEN METABOLIC PANEL: CPT

## 2022-08-18 PROCEDURE — 6370000000 HC RX 637 (ALT 250 FOR IP): Performed by: INTERNAL MEDICINE

## 2022-08-18 PROCEDURE — 94640 AIRWAY INHALATION TREATMENT: CPT

## 2022-08-18 PROCEDURE — 6360000002 HC RX W HCPCS: Performed by: STUDENT IN AN ORGANIZED HEALTH CARE EDUCATION/TRAINING PROGRAM

## 2022-08-18 PROCEDURE — 85025 COMPLETE CBC W/AUTO DIFF WBC: CPT

## 2022-08-18 PROCEDURE — 99291 CRITICAL CARE FIRST HOUR: CPT | Performed by: INTERNAL MEDICINE

## 2022-08-18 PROCEDURE — 6370000000 HC RX 637 (ALT 250 FOR IP): Performed by: NURSE PRACTITIONER

## 2022-08-18 PROCEDURE — 6360000002 HC RX W HCPCS: Performed by: NURSE PRACTITIONER

## 2022-08-18 PROCEDURE — 93010 ELECTROCARDIOGRAM REPORT: CPT | Performed by: INTERNAL MEDICINE

## 2022-08-18 PROCEDURE — 82803 BLOOD GASES ANY COMBINATION: CPT

## 2022-08-18 PROCEDURE — 84484 ASSAY OF TROPONIN QUANT: CPT

## 2022-08-18 RX ORDER — HEPARIN SODIUM 1000 [USP'U]/ML
1800 INJECTION, SOLUTION INTRAVENOUS; SUBCUTANEOUS ONCE
Status: COMPLETED | OUTPATIENT
Start: 2022-08-18 | End: 2022-08-18

## 2022-08-18 RX ORDER — CASTOR OIL AND BALSAM, PERU 788; 87 MG/G; MG/G
OINTMENT TOPICAL 2 TIMES DAILY
Status: DISCONTINUED | OUTPATIENT
Start: 2022-08-18 | End: 2022-08-30 | Stop reason: HOSPADM

## 2022-08-18 RX ORDER — SODIUM CHLORIDE 9 MG/ML
INJECTION, SOLUTION INTRAVENOUS CONTINUOUS
Status: DISCONTINUED | OUTPATIENT
Start: 2022-08-18 | End: 2022-08-19

## 2022-08-18 RX ORDER — INSULIN LISPRO 100 [IU]/ML
0-8 INJECTION, SOLUTION INTRAVENOUS; SUBCUTANEOUS EVERY 4 HOURS
Status: DISCONTINUED | OUTPATIENT
Start: 2022-08-18 | End: 2022-08-19

## 2022-08-18 RX ADMIN — CARBOXYMETHYLCELLULOSE SODIUM 1 DROP: 10 GEL OPHTHALMIC at 06:31

## 2022-08-18 RX ADMIN — HYDROCORTISONE SODIUM SUCCINATE 50 MG: 100 INJECTION, POWDER, FOR SOLUTION INTRAMUSCULAR; INTRAVENOUS at 08:24

## 2022-08-18 RX ADMIN — CARBOXYMETHYLCELLULOSE SODIUM 1 DROP: 10 GEL OPHTHALMIC at 14:00

## 2022-08-18 RX ADMIN — WHITE PETROLATUM 57.7 %-MINERAL OIL 31.9 % EYE OINTMENT: at 00:03

## 2022-08-18 RX ADMIN — WHITE PETROLATUM 57.7 %-MINERAL OIL 31.9 % EYE OINTMENT: at 17:02

## 2022-08-18 RX ADMIN — CARBOXYMETHYLCELLULOSE SODIUM 1 DROP: 10 GEL OPHTHALMIC at 22:51

## 2022-08-18 RX ADMIN — IPRATROPIUM BROMIDE AND ALBUTEROL SULFATE 1 AMPULE: 2.5; .5 SOLUTION RESPIRATORY (INHALATION) at 08:28

## 2022-08-18 RX ADMIN — Medication: at 21:54

## 2022-08-18 RX ADMIN — Medication 10 ML: at 08:25

## 2022-08-18 RX ADMIN — HEPARIN SODIUM 1800 UNITS: 1000 INJECTION INTRAVENOUS; SUBCUTANEOUS at 05:44

## 2022-08-18 RX ADMIN — MUPIROCIN: 20 OINTMENT TOPICAL at 20:00

## 2022-08-18 RX ADMIN — SODIUM CHLORIDE: 9 INJECTION, SOLUTION INTRAVENOUS at 22:08

## 2022-08-18 RX ADMIN — CEFTRIAXONE 2000 MG: 2 INJECTION, POWDER, FOR SOLUTION INTRAMUSCULAR; INTRAVENOUS at 12:24

## 2022-08-18 RX ADMIN — IPRATROPIUM BROMIDE AND ALBUTEROL SULFATE 1 AMPULE: 2.5; .5 SOLUTION RESPIRATORY (INHALATION) at 15:32

## 2022-08-18 RX ADMIN — IPRATROPIUM BROMIDE AND ALBUTEROL SULFATE 1 AMPULE: 2.5; .5 SOLUTION RESPIRATORY (INHALATION) at 23:04

## 2022-08-18 RX ADMIN — HEPARIN SODIUM 950 UNITS/HR: 10000 INJECTION, SOLUTION INTRAVENOUS at 19:56

## 2022-08-18 RX ADMIN — Medication 15 ML: at 08:24

## 2022-08-18 RX ADMIN — SODIUM CHLORIDE: 9 INJECTION, SOLUTION INTRAVENOUS at 12:19

## 2022-08-18 RX ADMIN — WHITE PETROLATUM 57.7 %-MINERAL OIL 31.9 % EYE OINTMENT: at 08:24

## 2022-08-18 RX ADMIN — CARBOXYMETHYLCELLULOSE SODIUM 1 DROP: 10 GEL OPHTHALMIC at 12:35

## 2022-08-18 RX ADMIN — WHITE PETROLATUM 57.7 %-MINERAL OIL 31.9 % EYE OINTMENT: at 12:00

## 2022-08-18 RX ADMIN — INSULIN LISPRO 2 UNITS: 100 INJECTION, SOLUTION INTRAVENOUS; SUBCUTANEOUS at 12:32

## 2022-08-18 RX ADMIN — NOREPINEPHRINE BITARTRATE 8 MCG/MIN: 1 INJECTION INTRAVENOUS at 22:12

## 2022-08-18 RX ADMIN — INSULIN LISPRO 1 UNITS: 100 INJECTION, SOLUTION INTRAVENOUS; SUBCUTANEOUS at 00:05

## 2022-08-18 RX ADMIN — HYDROCORTISONE SODIUM SUCCINATE 50 MG: 100 INJECTION, POWDER, FOR SOLUTION INTRAMUSCULAR; INTRAVENOUS at 21:53

## 2022-08-18 RX ADMIN — MUPIROCIN: 20 OINTMENT TOPICAL at 08:25

## 2022-08-18 RX ADMIN — FAMOTIDINE 20 MG: 10 INJECTION INTRAVENOUS at 08:24

## 2022-08-18 RX ADMIN — HYDROCORTISONE SODIUM SUCCINATE 50 MG: 100 INJECTION, POWDER, FOR SOLUTION INTRAMUSCULAR; INTRAVENOUS at 17:00

## 2022-08-18 RX ADMIN — WHITE PETROLATUM 57.7 %-MINERAL OIL 31.9 % EYE OINTMENT: at 03:20

## 2022-08-18 RX ADMIN — Medication 15 ML: at 20:00

## 2022-08-18 RX ADMIN — CARBOXYMETHYLCELLULOSE SODIUM 1 DROP: 10 GEL OPHTHALMIC at 03:20

## 2022-08-18 RX ADMIN — CEFEPIME HYDROCHLORIDE 1000 MG: 1 INJECTION, POWDER, FOR SOLUTION INTRAMUSCULAR; INTRAVENOUS at 04:20

## 2022-08-18 RX ADMIN — IPRATROPIUM BROMIDE AND ALBUTEROL SULFATE 1 AMPULE: 2.5; .5 SOLUTION RESPIRATORY (INHALATION) at 19:43

## 2022-08-18 RX ADMIN — Medication 25 MCG/HR: at 13:07

## 2022-08-18 RX ADMIN — WHITE PETROLATUM 57.7 %-MINERAL OIL 31.9 % EYE OINTMENT: at 19:59

## 2022-08-18 RX ADMIN — VANCOMYCIN HYDROCHLORIDE 750 MG: 750 INJECTION, POWDER, LYOPHILIZED, FOR SOLUTION INTRAVENOUS at 09:19

## 2022-08-18 RX ADMIN — Medication 10 ML: at 20:00

## 2022-08-18 RX ADMIN — HYDROCORTISONE SODIUM SUCCINATE 50 MG: 100 INJECTION, POWDER, FOR SOLUTION INTRAMUSCULAR; INTRAVENOUS at 03:20

## 2022-08-18 RX ADMIN — CARBOXYMETHYLCELLULOSE SODIUM 1 DROP: 10 GEL OPHTHALMIC at 17:01

## 2022-08-18 ASSESSMENT — PULMONARY FUNCTION TESTS
PIF_VALUE: 16
PIF_VALUE: 19
PIF_VALUE: 15
PIF_VALUE: 19
PIF_VALUE: 20
PIF_VALUE: 20
PIF_VALUE: 19
PIF_VALUE: 19
PIF_VALUE: 16
PIF_VALUE: 19
PIF_VALUE: 15
PIF_VALUE: 19
PIF_VALUE: 19
PIF_VALUE: 20
PIF_VALUE: 19
PIF_VALUE: 20
PIF_VALUE: 19
PIF_VALUE: 20
PIF_VALUE: 19
PIF_VALUE: 16
PIF_VALUE: 20
PIF_VALUE: 19
PIF_VALUE: 17
PIF_VALUE: 20
PIF_VALUE: 19
PIF_VALUE: 16

## 2022-08-18 NOTE — PROGRESS NOTES
Pharmacy - RE:  Low-dose Heparin drip  Current rate = 7.1 ml/h  (710 units/h)  Anti-Xa drawn @ 0339 = 0.28  Goal anti-Xa = 0.3 - 0.7   Per protocol, give a 1800 unit bolus and increase drip rate to 950 units/h (9.5 ml/h). Obtain another anti-Xa 8/18 @ 1230.

## 2022-08-18 NOTE — PROGRESS NOTES
08/17/22 2343   Patient Observation   Heart Rate 77   Resp 20   Breath Sounds   Right Upper Lobe Diminished   Right Middle Lobe Diminished   Right Lower Lobe Diminished   Left Upper Lobe Diminished   Left Lower Lobe Diminished   Ventilator Settings   Vt (Set, mL) 355 mL   Resp Rate (Set) 20 bmp   PEEP/CPAP (cmH2O) 8   FiO2  80 %   Vent Patient Data (Readings)   Vt (Measured) 517 mL   Peak Inspiratory Pressure (cmH2O) 23 cmH2O   Rate Measured 22 br/min   Minute Volume (L/min) 7.61 Liters   Mean Airway Pressure (cmH2O) 13 cmH20   Plateau Pressure (cm H2O) 19 cm H2O   I:E Ratio 1:2.80   PEEP Intrinsic (cm H2O) 8 cm H2O   Vent Alarm Settings   Low Minute Volume (lpm) 4 L/min   RR High (bpm) 45 br/min   Airway Clearance   Suction ET Tube   Sputum Amount Other (comment)  (none)   ETT (adult)   Placement Date: 08/17/22   Present on Admission/Arrival: Yes  Placed By: Other (comment)  Airway Tube Size: 7.5 mm  Location: Oral  Secured At: 22 cm  Measured From: Lips   ETT Placement Center

## 2022-08-18 NOTE — PROGRESS NOTES
09:00 Pt awakes to voices, grimaces to pain, moves BUE, BLE . Assessment as charted eva orientation pt is intubated , BSWR on for safety . Daughter at bedside and updated  10:12 Pt HR in 40s , P waves not always seen Dr. Bryce Tate Cardiology @ bedside and aware, believes pt may have tachy jamey syndrome, family @ bedside and updated.  Digoxin d/c by cardiology  10:30 Critical are rounds completed by Dr. Susy Son @ bedside , IVAB changed see MD orders   12:00 Pt remains intubated, reassessment unchanged and as charted  15:51 Pt remains intubated, sedated reassessment unchanged and as charted  19:00 Bedside handoff given to night nurse Ariella Mandujano, pt stable @ handoff intubated, sedated on low dose levo

## 2022-08-18 NOTE — PROGRESS NOTES
Pt son Beth Clarke called back stated that he spoke with Dr. Sergio Garcia earlier today regarding code status and wanted to change her from a full code. States that he does not want chest compressions, shock, defib, but wants to continue what is being done right now. Spoke with Dr. Jihan Acuña regarding this, wa going to place call to son and change order.

## 2022-08-18 NOTE — PROGRESS NOTES
Vancomycin Day # 2  Current dose = Pulse dosing per daily vancomycin levels. BUN/SRCR 75/1.9      Est CrCl = 21 ml/min  WBC   20            Tmax 98.9  Vancomycin random level this am = 17 mcg/ml  Will give 750 mg x1 dose today @ 1000 and recheck random level tomorrow am.  Pharmacy will continue to obtain daily random vancomycin levels and redose as appropriate.

## 2022-08-18 NOTE — PROGRESS NOTES
4 Eyes Skin Assessment     The patient is being assess for   Shift Handoff    I agree that 2 RN's have performed a thorough Head to Toe Skin Assessment on the patient. ALL assessment sites listed below have been assessed. Areas assessed by both nurses:   [x]   Head, Face, and Ears   [x]   Shoulders, Back, and Chest, Abdomen  [x]   Arms, Elbows, and Hands   [x]   Coccyx, Sacrum, and Ischium  [x]   Legs, Feet, and Heels  DTI to coccyx mepilex applied  Red heels slow to lion mepilex applied    **SHARE this note so that the co-signing nurse is able to place an eSignature**    Co-signer eSignature: Electronically signed by Zechariah Chi RN on 8/18/22 at 4:06 PM EDT    Does the Patient have Skin Breakdown?   Yes [facility] WOUND CARE was Initiated documentation include the Nereida-wound, Wound Assessment, Measurements, Dressing Treatment, Drainage, and Color\",          Bobby Prevention initiated:  Yes   Wound Care Orders initiated:  Yes      [address]  nurse consulted for Pressure Injury (Stage 3,4, Unstageable, DTI, NWPT, Complex wounds)and New or Established Ostomies:  Yes      Primary Nurse eSignature: Electronically signed by Summer Carroll RN on 8/18/22 at 4:06 PM EDT

## 2022-08-18 NOTE — CONSULTS
[facility] Wound Ostomy Continence Nurse  Consult Note       NAME:  Renetta Ibrahim  MEDICAL RECORD NUMBER:  [id number]  AGE: 80 y.o. GENDER: female  : 1940  TODAY'S DATE:  2022    Subjective: Patient sedated and on ventilator in ICU setting   Reason for WOCN Evaluation and Assessment: DTI to sacrum      Renetta Ibrahim is a 80 y.o. female referred by:   [x] Physician  [x] Nursing  [] Other:     Wound Identification:  Wound Type: pressure  Contributing Factors: chronic pressure, decreased mobility, malnutrition, incontinence of stool, and incontinence of urine    Consult received to DTI to sacrum POA. Son at bedside and stated that patient lives at [facility] with dementia and unable to communicate needs. He was unaware that there was a wound on sacrum and unaware of present events leading up to the hospitalization.        Patient Goal of Care: JUAN JOSE  [] Wound Healing  [] Odor Control  [] Palliative Care  [] Pain Control   [] Other:         PAST MEDICAL HISTORY        Diagnosis Date    Allergic rhinitis     CTS (carpal tunnel syndrome)     Diabetes     Hyperlipidemia     Hypertension     Lactic acidosis     due to metformin    OA (osteoarthritis)     Stroke [facility]) 2008    Memory       PAST SURGICAL HISTORY    Past Surgical History:   Procedure Laterality Date    BREAST BIOPSY      CARPAL TUNNEL RELEASE      CATARACT REMOVAL WITH IMPLANT  13    right    EYE SURGERY  12    cataract with implant Left Eye    HYSTERECTOMY (CERVIX STATUS UNKNOWN)      LUMBAR LAMINECTOMY      SHOULDER ARTHROSCOPY      LEFT    KIKO AND BSO (CERVIX REMOVED)         FAMILY HISTORY    Family History   Problem Relation Age of Onset    Arthritis Mother     Cancer Mother         leukemia    Diabetes Mother     Heart Disease Mother     Arthritis Father     Arthritis Sister     Other Sister         Muscular dystrophy    Arthritis Brother     High Blood Pressure Brother        SOCIAL HISTORY    Social History     Tobacco Required : Yes   [x] Low Air Loss pt on ICU surface  [] Pressure Redistribution  [] Fluid Immersion  [] Bariatric  [] Total Pressure Relief  [] Other:     Current Diet: Diet NPO  ADULT TUBE FEEDING; Orogastric; Diabetic; Continuous; 20; Yes; 20; Q 4 hours; 40; 30; Q 3 hours; Protein; one proteinex P2Go once daily  Dietician consult:  Yes    Discharge Plan:  Placement for patient upon discharge: skilled nursing    Patient appropriate for Outpatient [address]: Yes    Referrals:  [x]   [] [facility]  [] Supplies  [] Other    Patient/Caregiver Teaching:  Level of patient/caregiver understanding able to:   [] Indicates understanding       [] Needs reinforcement  [] Unsuccessful      [] Verbal Understanding  [] Demonstrated understanding       [] No evidence of learning  [] Refused teaching         [x] N/A       Electronically signed by MILAGROS Grant on 8/18/2022 at 3:55 PM

## 2022-08-18 NOTE — PROGRESS NOTES
RT Inhaler-Nebulizer Bronchodilator Protocol Note    There is a bronchodilator order in the chart from a provider indicating to follow the RT Bronchodilator Protocol and there is an Initiate RT Inhaler-Nebulizer Bronchodilator Protocol order as well (see protocol at bottom of note). CXR Findings:  XR CHEST PORTABLE    Result Date: 8/18/2022  No significant interval change in moderate left-sided airspace disease as compared to prior. Trace bilateral pleural effusions or pleural thickening. XR CHEST PORTABLE    Result Date: 8/17/2022  1. Left-sided pneumonia. Recommend imaging follow-up to resolution. 2. Devices in place as above. The findings from the last RT Protocol Assessment were as follows:   History Pulmonary Disease: Smoker 15 pack years or more  Respiratory Pattern: Dyspnea on exertion or RR 21-25 bpm  Breath Sounds: Slightly diminished and/or crackles  Cough: Weak, productive  Indication for Bronchodilator Therapy: Decreased or absent breath sounds  Bronchodilator Assessment Score: 7    Aerosolized bronchodilator medication orders have been revised according to the RT Inhaler-Nebulizer Bronchodilator Protocol below. Respiratory Therapist to perform RT Therapy Protocol Assessment initially then follow the protocol. Repeat RT Therapy Protocol Assessment PRN for score 0-3 or on second treatment, BID, and PRN for scores above 3. No Indications - adjust the frequency to every 6 hours PRN wheezing or bronchospasm, if no treatments needed after 48 hours then discontinue using Per Protocol order mode. If indication present, adjust the RT bronchodilator orders based on the Bronchodilator Assessment Score as indicated below.   Use Inhaler orders unless patient has one or more of the following: on home nebulizer, not able to hold breath for 10 seconds, is not alert and oriented, cannot activate and use MDI correctly, or respiratory rate 25 breaths per minute or more, then use the equivalent

## 2022-08-18 NOTE — CONSULTS
[facility]  [phone]        Reason for Consultation/Chief Complaint: \"I have been asked to see her for afib. \"    History of Present Illness:  Neva Hinojosa is a 80 y.o. patient who presented to the hospital with acute hypoxic resp failure. She has been living at Nursing home for three yrs and has been doing fine until on 8.17.22 she was noted to by hypoxic and was sent to ED. Now she is diagnosed with pneumonia sepsis acute afib and acute resp failure. I spoke to her daughter in ICU who told me that patient is suffering from dementia for about three yrs. Patient has no prior heart disease according to her and has HBP DM  She is here hypotensive in shock with lactic acidosis. I have been asked to provide consultation regarding further management and testing. Past Medical History:   has a past medical history of Allergic rhinitis, CTS (carpal tunnel syndrome), Diabetes, Hyperlipidemia, Hypertension, Lactic acidosis, OA (osteoarthritis), and Stroke ([address] Surgical History:   has a past surgical history that includes Total abdominal hysterectomy w/ bilateral salpingoophorectomy; lumbar laminectomy; Breast biopsy; Carpal tunnel release; eye surgery (12/14/12); Cataract removal with implant (1/11/13); Shoulder arthroscopy; and Hysterectomy. Social History:  35 pack yr former smoker   reports that she quit smoking about 9 years ago. Her smoking use included cigarettes. She has a 30.00 pack-year smoking history. She has never used smokeless tobacco. She reports that she does not drink alcohol and does not use drugs. Family History:  family history includes Arthritis in her brother, father, mother, and sister; Cancer in her mother; Diabetes in her mother; Heart Disease in her mother; High Blood Pressure in her brother; Other in her sister. Home Medications:  Were reviewed and are listed in nursing record.  and/or listed below  Prior to Admission medications Medication Sig Start Date End Date Taking?  Authorizing Provider   SITagliptin (JANUVIA) 100 MG tablet Take 1 tablet by mouth daily 1/7/20   Pauly Butt MD   montelukast (SINGULAIR) 10 MG tablet TAKE ONE TABLET BY MOUTH DAILY 12/23/19   BRANDY Loyola CNP   traZODone (DESYREL) 100 MG tablet Take 1 tablet by mouth nightly 10/16/19 11/15/19  BRANDY Loyola CNP   Polyethylene Glycol 3350 (MIRALAX PO) Take by mouth    Historical Provider, MD   Aspirin-Acetaminophen-Caffeine (EXCEDRIN MIGRAINE PO) Take 2 tablets by mouth daily    Historical Provider, MD        Allergies:  Avandia [rosiglitazone maleate], Codeine, Glucophage [metformin hydrochloride], Hydrochlorothiazide, and Tramadol     Review of Systems: NA    Physical Examination:    Vitals:    08/18/22 0800   BP: 116/77   Pulse: 80   Resp: 18   Temp:    SpO2: 94%    Weight: 128 lb 4.9 oz (58.2 kg)         General Appearance:  She is acutely ill on ventilator    Head:  Normocephalic, without obvious abnormality, atraumatic   Eyes:  PERRL, conjunctiva/corneas clear       Nose: Nares normal, no drainage or sinus tenderness   Throat: Lips, mucosa, and tongue normal   Neck: Supple, symmetrical, trachea midline, no adenopathy, thyroid: not enlarged, symmetric, no tenderness/mass/nodules, no carotid bruit or JVD       Lungs:   Clear to auscultation bilaterally,    Chest Wall:  No tenderness or deformity   Heart:  Regular rate and rhythm, S1, S2 normal, no murmur, rub or gallop   Abdomen:   Soft, non-tender, bowel sounds active all four quadrants,  no masses, no organomegaly           Extremities: Extremities normal, atraumatic, no cyanosis or edema   Pulses: 2+ and symmetric   Skin: Skin color, texture, turgor normal, no rashes or lesions   Pysch: She is not sedated and is unresponsive     Neurologic: Normal gross motor and sensory exam.         Labs  CBC:   Lab Results   Component Value Date/Time    WBC 20.0 08/18/2022 03:39 AM    RBC 4.33 08/18/2022 03:39 AM    HGB 13.7 08/18/2022 03:39 AM    HCT 43.6 08/18/2022 03:39 AM    .7 08/18/2022 03:39 AM    RDW 19.7 08/18/2022 03:39 AM     08/18/2022 03:39 AM     CMP:    Lab Results   Component Value Date/Time     08/18/2022 03:39 AM    K 4.0 08/18/2022 03:39 AM     08/18/2022 03:39 AM    CO2 20 08/18/2022 03:39 AM    BUN 75 08/18/2022 03:39 AM    CREATININE 1.9 08/18/2022 03:39 AM    GFRAA 31 08/18/2022 03:39 AM    GFRAA >60 04/16/2013 11:09 AM    AGRATIO 0.4 08/18/2022 03:39 AM    LABGLOM 25 08/18/2022 03:39 AM    GLUCOSE 219 08/18/2022 03:39 AM    PROT 6.1 08/18/2022 03:39 AM    PROT 6.8 06/17/2011 10:21 AM    CALCIUM 9.5 08/18/2022 03:39 AM    BILITOT 0.8 08/18/2022 03:39 AM    ALKPHOS 95 08/18/2022 03:39 AM    AST 18 08/18/2022 03:39 AM    ALT 13 08/18/2022 03:39 AM     PT/INR:  No results found for: PTINR  Lab Results   Component Value Date    CKTOTAL 38 03/21/2017    TROPONINI 0.02 (H) 08/18/2022       EKG:  I have reviewed EKG with the following interpretation:  Impression:   Marked sinus bradycardia with short PRLow voltage QRSSeptal infarct , age undeterminedAbnormal ECGWhen compared with ECG of 17-AUG-2022 13:36,Significant changes have occurredConfirmed by Malinda Robison MD, [address] (1986) on 8/18/2022 7:53:52 AM     Atrial fibrillation with rapid ventricular responseLow voltage QRSST depression, consider subendocardial injury or digitalis effectNonspecific T wave abnormality , probably digitalis effectAbnormal ECGWhen compared with ECG of 17-AUG-2022 13:20, (unconfirmed)No significant change was foundConfirmed by Malinda Robison MD, [address] (1986) on 8/17/2022 8:26:07 PM   Atrial fibrillationwith rapid ventricular responseLow voltage QRS anterolateral subendocardial ischemia. Cannot rule out Anterior infarct , age undeterminedAbnormal ECGWhen compared with ECG of 17-AUG-2022 12:22, (unconfirmed)Significant changes have occurredConfirmed by Malinda Robison MD, [address] (1986) on 8/17/2022 8:25:38 PM   CXRAY 8. 17.22  FINDINGS:   Endotracheal tube has tip approximally 5.5 cm proximal to the justin. Nasogastric tube extends to the inferior margin of the film, within the upper   abdomen. Moderate heterogeneous pulmonary opacity within the left mid to   lower lung is not significantly changed as compared to prior. Blunting of   the costophrenic angles. No gross pneumothorax. Cardiac and mediastinal   silhouettes are unchanged. Calcification of aorta. Impression   No significant interval change in moderate left-sided airspace disease as   compared to prior. Trace bilateral pleural effusions or pleural thickening. Assessment  Afib RVR resolved  Sinus bradycardia  Tachy jamey syndrome  Acute resp failure  Hypotension shock   Pneumococcal pneumonia   Essential hypertension   DM-2  Dementia   She is euthyroid  Plan   She was on amiodarone drip yesterday but off now  She has received dig yesterday no more dig indicated  IV fluids  She is on two pressors levophed and vasopressin  She is anticoagulated with heparin  Antibiotics  Resp support as per pulmonary critical care  Plan:    I had the opportunity to review the clinical symptoms and presentation of Ramsey Bush. Discussed with daughter  They no longer want CPR on her  Discussed pacemaker but in light of other morbidities it is not likely to help. Thank you for allowing to us to participate in the care or Ramsey Bush. Further evaluation will be based upon the patient's clinical course and testing results. All questions and concerns were addressed to the patient/family. Alternatives to my treatment were discussed. The note was completed using EMR. Every effort was made to ensure accuracy; however, inadvertent computerized transcription errors may be present.

## 2022-08-18 NOTE — PROGRESS NOTES
Pt converted from afib to Sinus jamey. Amio gtt stopped. MD notified of pt heart rate 39-45. Awaiting response.

## 2022-08-18 NOTE — PROGRESS NOTES
then use the equivalent nebulizer order(s) with same Frequency and PRN reasons based on the score. If a patient is on this medication at home then do not decrease Frequency below that used at home. 0-3 - enter or revise RT bronchodilator order(s) to equivalent RT Bronchodilator order with Frequency of every 4 hours PRN for wheezing or increased work of breathing using Per Protocol order mode. 4-6 - enter or revise RT Bronchodilator order(s) to two equivalent RT bronchodilator orders with one order with BID Frequency and one order with Frequency of every 4 hours PRN wheezing or increased work of breathing using Per Protocol order mode. 7-10 - enter or revise RT Bronchodilator order(s) to two equivalent RT bronchodilator orders with one order with TID Frequency and one order with Frequency of every 4 hours PRN wheezing or increased work of breathing using Per Protocol order mode. 11-13 - enter or revise RT Bronchodilator order(s) to one equivalent RT bronchodilator order with QID Frequency and an Albuterol order with Frequency of every 4 hours PRN wheezing or increased work of breathing using Per Protocol order mode. Greater than 13 - enter or revise RT Bronchodilator order(s) to one equivalent RT bronchodilator order with every 4 hours Frequency and an Albuterol order with Frequency of every 2 hours PRN wheezing or increased work of breathing using Per Protocol order mode.          Electronically signed by Radha Garcia RCP on 8/18/2022 at 9:26 AM

## 2022-08-18 NOTE — PROGRESS NOTES
RT Inhaler-Nebulizer Bronchodilator Protocol Note    There is a bronchodilator order in the chart from a provider indicating to follow the RT Bronchodilator Protocol and there is an Initiate RT Inhaler-Nebulizer Bronchodilator Protocol order as well (see protocol at bottom of note). CXR Findings:  XR CHEST PORTABLE    Result Date: 8/17/2022  1. Left-sided pneumonia. Recommend imaging follow-up to resolution. 2. Devices in place as above. The findings from the last RT Protocol Assessment were as follows:   History Pulmonary Disease: Smoker 15 pack years or more  Respiratory Pattern: Dyspnea on exertion or RR 21-25 bpm  Breath Sounds: Slightly diminished and/or crackles  Cough: Unable to generate effective cough  Indication for Bronchodilator Therapy: Decreased or absent breath sounds  Bronchodilator Assessment Score: 9    Aerosolized bronchodilator medication orders have been revised according to the RT Inhaler-Nebulizer Bronchodilator Protocol below. Respiratory Therapist to perform RT Therapy Protocol Assessment initially then follow the protocol. Repeat RT Therapy Protocol Assessment PRN for score 0-3 or on second treatment, BID, and PRN for scores above 3. No Indications - adjust the frequency to every 6 hours PRN wheezing or bronchospasm, if no treatments needed after 48 hours then discontinue using Per Protocol order mode. If indication present, adjust the RT bronchodilator orders based on the Bronchodilator Assessment Score as indicated below. Use Inhaler orders unless patient has one or more of the following: on home nebulizer, not able to hold breath for 10 seconds, is not alert and oriented, cannot activate and use MDI correctly, or respiratory rate 25 breaths per minute or more, then use the equivalent nebulizer order(s) with same Frequency and PRN reasons based on the score. If a patient is on this medication at home then do not decrease Frequency below that used at home.     0-3 - enter or revise RT bronchodilator order(s) to equivalent RT Bronchodilator order with Frequency of every 4 hours PRN for wheezing or increased work of breathing using Per Protocol order mode. 4-6 - enter or revise RT Bronchodilator order(s) to two equivalent RT bronchodilator orders with one order with BID Frequency and one order with Frequency of every 4 hours PRN wheezing or increased work of breathing using Per Protocol order mode. 7-10 - enter or revise RT Bronchodilator order(s) to two equivalent RT bronchodilator orders with one order with TID Frequency and one order with Frequency of every 4 hours PRN wheezing or increased work of breathing using Per Protocol order mode. 11-13 - enter or revise RT Bronchodilator order(s) to one equivalent RT bronchodilator order with QID Frequency and an Albuterol order with Frequency of every 4 hours PRN wheezing or increased work of breathing using Per Protocol order mode. Greater than 13 - enter or revise RT Bronchodilator order(s) to one equivalent RT bronchodilator order with every 4 hours Frequency and an Albuterol order with Frequency of every 2 hours PRN wheezing or increased work of breathing using Per Protocol order mode.          Electronically signed by Maksim Soares RCP on 8/17/2022 at 8:55 PM

## 2022-08-18 NOTE — PROGRESS NOTES
Current Nutrition Intake & Therapies:    Average Meal Intake: NPO  Average Supplements Intake: NPO  Current Tube Feeding (TF) Orders:  Feeding Route: Orogastric  Formula: Diabetic  Schedule: Continuous  Feeding Regimen: Glucerna 1.5 with a goal rate of 40 ml/hr x 20 hours  Additives/Modulars: Protein (one proteinex P2Go once daily via feeding tube)  Water Flushes: 30 ml every 3 hours for tube patency  Current TF & Flush Orders Provides: TF to be started today  Goal TF & Flush Orders Provides: Glucerna 1.5 with a goal rate of 40 ml/hr x 20 hours = 800 ml TV, 1200 kcals, 66 g protein, and 607 ml free water + 30 ml water flushes every 3 hours for tube patency + 26 g protein and 104 kcals from one proteinex P2Go once daily (92 g protein and 1304 kcals total)    Anthropometric Measures:  Height: 5' 6\" (167.6 cm)  Ideal Body Weight (IBW): 130 lbs (59 kg)    Admission Body Weight: 131 lb (59.4 kg) (obtained on 8/17/22; actual weight)  Current Body Weight: 128 lb 4.9 oz (58.2 kg) (obtained on 8/18/22; actual weight), 98.7 % IBW.  Weight Source: Bed Scale  Current BMI (kg/m2): 20.7  Usual Body Weight: 131 lb (59.4 kg) (obtained on 8/17/22; actual weight)  % Weight Change (Calculated): -2.1  Weight Adjustment For: No Adjustment                 BMI Categories: Underweight (BMI less than 22) age over 72    Estimated Daily Nutrient Needs:  Energy Requirements Based On: Kcal/kg  Weight Used for Energy Requirements: Current  Energy (kcal/day): 1334 - 1450 kcals based on 23-25 kcals/kg/CBW  Weight Used for Protein Requirements: Current  Protein (g/day): 81 - 93 g protein based on 1.4-1.6 g/kg/CBW  Method Used for Fluid Requirements: 1 ml/kcal  Fluid (ml/day): 1334 - 1450 ml    Nutrition Diagnosis:   Inadequate oral intake related to inadequate protein-energy intake, impaired respiratory function, cognitive or neurological impairment as evidenced by NPO or clear liquid status due to medical condition, intubation, lab values    Nutrition Interventions:   Food and/or Nutrient Delivery: Continue NPO, Start Tube Feeding  Nutrition Education/Counseling: No recommendation at this time  Coordination of Nutrition Care: Continue to monitor while inpatient, Interdisciplinary Rounds       Goals:  Previous Goal Met:  (N/A)  Goals: Initiate nutrition support       Nutrition Monitoring and Evaluation:   Behavioral-Environmental Outcomes: None Identified  Food/Nutrient Intake Outcomes: Enteral Nutrition Intake/Tolerance  Physical Signs/Symptoms Outcomes: Biochemical Data, Hemodynamic Status, Nutrition Focused Physical Findings, Skin, Weight    Discharge Planning:     Too soon to determine     Sindhu Beal, [address] [place] [address], LD  Contact: [phone]

## 2022-08-18 NOTE — DISCHARGE INSTR - COC
Continuity of Care Form    Patient Name: Amisha Smith   :  1940  MRN:  [id number]    Admit date:  2022  Discharge date:  2022      Code Status Order: DNR-CC  Advance Directives:     Admitting Physician:  Rg Caldera MD  PCP: No primary care provider on file.     Discharging Nurse: [facility]/Room#: 2225/7032-47  Discharging Unit Phone Number: [phone]      Emergency Contact:   Extended Emergency Contact Information  Primary Emergency Contact: Karri Reece  Address: [address] [place] [address], [address] Phone: [phone]  Relation: Child  Secondary Emergency Contact:  [address] Phone: [phone]  Relation: Child    Past Surgical History:  Past Surgical History:   Procedure Laterality Date    BREAST BIOPSY      CARPAL TUNNEL RELEASE      CATARACT REMOVAL WITH IMPLANT  13    right    EYE SURGERY  12    cataract with implant Left Eye    HYSTERECTOMY (CERVIX STATUS UNKNOWN)      LUMBAR LAMINECTOMY      SHOULDER ARTHROSCOPY      LEFT    KIKO AND BSO (CERVIX REMOVED)         Immunization History:   Immunization History   Administered Date(s) Administered    Influenza 2011, 2012, 10/25/2013    Influenza Virus Vaccine 10/01/2009, 10/27/2014, 10/06/2015    Influenza Whole 2010    Influenza, AFLURIA, [place] (age 1 y+), MDV 2016    Pneumococcal Conjugate 13-valent ([id number]) 10/06/2015    Pneumococcal Conjugate 7-valent (Prevnar7) 2005    Pneumococcal Polysaccharide ([id number]) 2010       Active Problems:  Patient Active Problem List   Diagnosis Code    Hypercholesteremia E78.00    Vitamin D deficiency E55.9    Seasonal allergic rhinitis due to pollen J30.1    Panlobular emphysema ([facility] J43.1    Lumbar disc disease M51.9    PVD (peripheral vascular disease) ([facility] I73.9    Noncompliance Z91.19    Type 2 diabetes mellitus without complication, without long-term current use of insulin (HCC) E11.9    Cerebral infarction ([address] I63.9    Arthritis M19.90    Late onset Alzheimer's disease without behavioral disturbance ([facility]) G30.1, F02.80    Chronic migraine without aura without status migrainosus, not intractable G43.709    ROMERO (nonalcoholic steatohepatitis) K75.81    Osteoarthritis of right AC (acromioclavicular) joint M19.011    Shoulder impingement, right M75.41    Cerebral microvasculopathy I67.89    Primary insomnia F51.01    Multifocal pneumonia J18.9    Acute hypoxemic respiratory failure (HCC) J96.01    Septic shock ([facility]) A41.9, R65.21       Isolation/Infection:   Isolation            No Isolation          Patient Infection Status       Infection Onset Added Last Indicated Last Indicated By Review Planned Expiration Resolved Resolved By    None active    Resolved    COVID-19 (Rule Out) 08/17/22 08/17/22 08/17/22 COVID-19 & Influenza Combo (Ordered)   08/17/22 Rule-Out Test Resulted            Nurse Assessment:  Last Vital Signs: /67   Pulse 85   Temp 98.4 °F (36.9 °C) (Bladder)   Resp 20   Ht 5' 6\" (1.676 m)   Wt 128 lb 4.9 oz (58.2 kg)   SpO2 94%   BMI 20.71 kg/m²     Last documented pain score (0-10 scale):    Last Weight:   Wt Readings from Last 1 Encounters:   08/18/22 128 lb 4.9 oz (58.2 kg)     Mental Status:  alert    IV Access:  - None    Nursing Mobility/ADLs:  Walking   Dependent  Transfer  Dependent  Bathing  Dependent  Dressing  Dependent  Toileting  Dependent  Feeding  Dependent  Med Admin  Dependent  Med Delivery   crushed    Wound Care Documentation and Therapy:  Wound 08/17/22 Sacrum Dorsal scab/with red/purple discoloration (Active)   Wound Etiology Deep tissue/Injury 08/18/22 0900   Dressing Status Clean;Dry; Intact 08/18/22 0900   Wound Cleansed Not Cleansed 08/17/22 1800   Dressing/Treatment Foam 08/18/22 0900   Number of days: 0        Elimination:  Continence:    Bowel: No  Bladder: No  Urinary Catheter: None   Colostomy/Ileostomy/Ileal Conduit: No       Date of Last BM: 8/29    Intake/Output Summary (Last 24 hours) at 8/18/2022 1125  Last data filed at 8/18/2022 4285  Gross per 24 hour   Intake 2055.32 ml   Output 230 ml   Net 1825.32 ml     I/O last 3 completed shifts: In: 1968.2 [I.V.:1889.1; IV Piggyback:79.1]  Out: 230 [Urine:230]    Safety Concerns:     Aspiration Risk    Impairments/Disabilities:      None    Nutrition Therapy:  Current Nutrition Therapy:   - Oral Diet:  Dysphagia 1 pureed    Routes of Feeding: Oral  Liquids: Thin Liquids  Daily Fluid Restriction: no  Last Modified Barium Swallow with Video (Video Swallowing Test): not done    Treatments at the Time of Hospital Discharge:   Respiratory Treatments:   Oxygen Therapy:  is on oxygen at 4 L/min per nasal cannula.   Ventilator:    - No ventilator support    Rehab Therapies: Hospice  Weight Bearing Status/Restrictions: No weight bearing restrictions  Other Medical Equipment (for information only, NOT a DME order):  hospital bed  Other Treatments:     Patient's personal belongings (please select all that are sent with patient):  None    RN SIGNATURE:  Electronically signed by Neel Hare RN on 8/30/22 at 2:05 PM EDT    CASE MANAGEMENT/SOCIAL WORK SECTION    Inpatient Status Date: 08/17/2022     Readmission Risk Assessment Score:  Readmission Risk              Risk of Unplanned Readmission:  20           Discharging to Facility/ Agency   Name: Name: [facility]  Address: [address] [place] [address], [address]  Phone: [phone]  Fax:  [phone]   Address:  Phone:   Fax:    Dialysis Facility (if applicable)   Name:  Address:  Dialysis Schedule:  Phone:  Fax:    / signature: Electronically signed by Tigre Hurst RN on 8/30/22 at 2:11 PM EDT    PHYSICIAN SECTION    Prognosis: Poor    Condition at Discharge: Terminal    Rehab Potential (if transferring to Rehab): Poor    Recommended Labs or Other Treatments After Discharge: none    Physician Certification: I certify the above information and transfer of Jim Pinto  is necessary for the continuing treatment of the diagnosis listed and that she requires Hospice for less 30 days.      Update Admission H&P: No change in H&P    PHYSICIAN SIGNATURE:  Electronically signed by Coni Sanches MD on 8/30/22 at 1:02 PM EDT

## 2022-08-18 NOTE — PROGRESS NOTES
120ml of fentanyl wasted   Electronically signed by Valentin Bowers RN on 8/18/2022 at 6:35 AM  Electronically signed by Odilia Interiano RN on 8/18/2022 at 6:57 AM

## 2022-08-18 NOTE — PROGRESS NOTES
08/18/22 0324   Patient Observation   Heart Rate 95   Resp 21   SpO2 (!) 84 %   Breath Sounds   Right Upper Lobe Diminished   Right Middle Lobe Diminished   Right Lower Lobe Diminished   Left Upper Lobe Diminished   Left Lower Lobe Diminished   Vent Information   $Ventilation $Subsequent Day   Ventilator Settings   Vt (Set, mL) 355 mL   Resp Rate (Set) 20 bmp   PEEP/CPAP (cmH2O) 8   FiO2  60 %   Vent Patient Data (Readings)   Vt (Measured) 342 mL   Peak Inspiratory Pressure (cmH2O) 19 cmH2O   Rate Measured 22 br/min   Minute Volume (L/min) 6.27 Liters   Mean Airway Pressure (cmH2O) 11 cmH20   Plateau Pressure (cm H2O) 19 cm H2O   I:E Ratio 1:2.80   Vent Alarm Settings   Low Minute Volume (lpm) 4 L/min   RR High (bpm) 45 br/min   Additional Respiratoray Assessments   Humidification Source Heated wire   Humidification Temp 36.3   Circuit Condensation Drained   Ambu Bag With Mask At Bedside Yes   Airway Clearance   Suction ET Tube   Suction Device Inline suction catheter   Sputum Amount Other (comment)  (none)   ETT (adult)   Placement Date: 08/17/22   Present on Admission/Arrival: Yes  Placed By: Other (comment)  Airway Tube Size: 7.5 mm  Location: Oral  Secured At: 22 cm  Measured From: Lips   ETT Placement Right

## 2022-08-18 NOTE — PROGRESS NOTES
Pharmacy - RE:  Low-dose Heparin drip  Current rate = 9.5 ml/h  (950 units/h)  Anti-Xa drawn @ 1420 = 0.40  Goal anti-Xa = 0.3 - 0.7   Per protocol, continue with 950 units/hr (9.5ml/hr)  Obtain another anti-Xa 8/18 @ 2100.   Julio Enamorado Pharm D 7/42/29189:39 PM  .

## 2022-08-18 NOTE — PROGRESS NOTES
Pulmonary & Critical Care Medicine ICU Progress Note    CC: Acute respiratory failure    Events of Last 24 hours: Admitted to the ICU with respiratory failure and septic shock    Invasive Lines: 2022 right femoral CVC in ER    MV: 2022  Vent Mode: AC/VC Resp Rate (Set): 20 bmp/Vt (Set, mL): 355 mL/ /FiO2 : 50 %  Recent Labs     22  1230 22  0551   PHART 7.432 7.440   NIP4WJX 37.3 34.7*   PO2ART 68.1* 93.9       IV:   sodium chloride 100 mL/hr at 22 1219    norepinephrine 13 mcg/min (22 1036)    vasopressin (Septic Shock) infusion Stopped (22 1041)    propofol Stopped (22 1433)    heparin (PORCINE) Infusion 950 Units/hr (22 0702)    sodium chloride      propofol      fentaNYL Stopped (22 0248)       Vitals:  Blood pressure 103/67, pulse 85, temperature 98.4 °F (36.9 °C), temperature source Bladder, resp. rate 20, height 5' 6\" (1.676 m), weight 128 lb 4.9 oz (58.2 kg), SpO2 94 %. on ventilator  Temp  Av.1 °F (36.7 °C)  Min: 96.1 °F (35.6 °C)  Max: 98.9 °F (37.2 °C)    Intake/Output Summary (Last 24 hours) at 2022 1237  Last data filed at 2022 1280  Gross per 24 hour   Intake 2055.32 ml   Output 230 ml   Net 1825.32 ml     EXAM:  General: intubated, ill appearing    ENT: Pharynx with ETT. Resp: No crackles. No wheezing. CV: S1, S2. No edema  GI: NT, ND, +BS  Skin: Warm and dry. Neuro: PERRL. Not following commands.  Patellar reflexes are symmetric     Scheduled Meds:   cefTRIAXone (ROCEPHIN) IV  2,000 mg IntraVENous Q24H    insulin lispro  0-8 Units SubCUTAneous Q4H    sodium chloride flush  5-40 mL IntraVENous 2 times per day    insulin lispro  0-4 Units SubCUTAneous Q4H    hydrocortisone sodium succinate PF  50 mg IntraVENous Q6H    carboxymethylcellulose PF  1 drop Both Eyes Q4H    And    artificial tears   Both Eyes Q4H    chlorhexidine  15 mL Mouth/Throat BID    famotidine (PEPCID) injection  20 mg IntraVENous Daily    mupirocin Nasal BID    ipratropium-albuterol  1 ampule Inhalation Q4H While awake     PRN Meds:  heparin (porcine), heparin (porcine), sodium chloride flush, sodium chloride, ondansetron **OR** ondansetron, polyethylene glycol, acetaminophen **OR** acetaminophen, propofol, fentaNYL **AND** fentaNYL, ipratropium-albuterol    Results:  CBC:   Recent Labs     22  1230 22  0339   WBC 17.9* 20.0*   HGB 13.1 13.7   HCT 40.0 43.6   MCV 97.8 100.7*    193     BMP:   Recent Labs     22  1230 22  0339   * 138   K 3.7 4.0    100   CO2 24 20*   BUN 72* 75*   CREATININE 2.0* 1.9*     LIVER PROFILE:   Recent Labs     22  1230 22  0339   AST 23 18   ALT 13 13   LIPASE 17.0  --    BILITOT 1.2* 0.8   ALKPHOS 94 95     Cultures:  2022 SARS-CoV-2 and influenza are negative  2022 blood sent  2022 streptococcal pneumonia antigen is positive     Imagin2022 C-spine no acute abnormality  2022 chest CT left greater than right lower lobe predominant airspace disease  2022 abdominal CT gallbladder distention, cystic pancreatic lesions, atherosclerotic disease of aorta    ASSESSMENT:  Acute hypoxemic respiratory failure   Septic shock  A. fib RVR, has converted to sinus bradycardia  Streptococcal pneumoniae pneumonia  Dementia of the Alzheimer's type  Chronic nursing home resident  Diabetes  Low voltage EKG    PLAN:  Mechanical ventilation as per my orders.  The ventilator was adjusted by me at the bedside for unstable, life threatening respiratory failure  IV Propofol if needed for sedation, target RASS -2, with daily SAT  Fentanyl gtt as needed  Daily SBT per protocol   Cefepime and vancomycin day #2; changed to ceftriaxone today  IV levophed & vasopressin to maintain MAP of 65  Off amiodarone after conversion to sinus  Blood sugar control, with goal 140-180  Nutrition: Tube feeds should be started  On heparin infusion    Bedside echo does not show evidence of tamponade, effusion is small but views were suboptimal   Prophylaxis: Pepcid and Bactroban  DNR CCA    Total critical care time caring for this patient with life threatening, unstable organ failure, including direct patient contact, management of life support systems, review of data including imaging and labs, discussions with other team members and physicians at least 40 minutes so far today, excluding procedures.

## 2022-08-18 NOTE — PROGRESS NOTES
Pt remains SB in the 40's, dropping down and remaining in the mid to high 30's. MD notified, awaiting orders. Pt off amio gtt and fentanyl gtt,   Remains on vaso and levo and heparin gtt.

## 2022-08-18 NOTE — H&P
Admission H & P    Thompson Reynoso;  MRN: [id number] ; Admit Date: 8/17/2022 12:16 PM   Current Date and Time: 8/18/2022 8:09 AM    PCP  --  No primary care provider on file. Chief Complaint   Patient presents with    Respiratory Distress     EMS states that pt is usually confused. EMS states that pts O2 sat were in the 70s'. EMS administered ketamine and rocc. Pt is intubated          HPI:  Patient is a 80 y.o.  female  With known h.o  HTN, DM, CVA, dementia living in a NH presented with increasing resp distress by EMS. Pt was noted to be septic, hypotensive, given IVF started on levophed , was placed on vent support by EMS and brought to Er . She developed Afibb with RVR, started on amiodarone, digoxin and admitted to ICU with vent support . Critical care consulted    No other info available .  Pt seen sedated on vent with no distress     Allergies   Allergen Reactions    Avandia [Rosiglitazone Maleate]     Codeine      Per son Codeine caused patient to go into cardiac arrest    Glucophage [Metformin Hydrochloride]      Lactic acidosis      Hydrochlorothiazide     Tramadol        Past Medical History:   Diagnosis Date    Allergic rhinitis     CTS (carpal tunnel syndrome)     Diabetes     Hyperlipidemia     Hypertension     Lactic acidosis     due to metformin    OA (osteoarthritis)     Stroke [facility]) 2008    Memory      Past Surgical History:   Procedure Laterality Date    BREAST BIOPSY      CARPAL TUNNEL RELEASE      CATARACT REMOVAL WITH IMPLANT  1/11/13    right    EYE SURGERY  12/14/12    cataract with implant Left Eye    HYSTERECTOMY (CERVIX STATUS UNKNOWN)      LUMBAR LAMINECTOMY      SHOULDER ARTHROSCOPY      LEFT    KIKO AND BSO (CERVIX REMOVED)        Medications Prior to Admission: SITagliptin (JANUVIA) 100 MG tablet, Take 1 tablet by mouth daily  montelukast (SINGULAIR) 10 MG tablet, TAKE ONE TABLET BY MOUTH DAILY  traZODone (DESYREL) 100 MG tablet, Take 1 tablet by mouth nightly  Polyethylene Glycol 3350 (MIRALAX PO), Take by mouth  Aspirin-Acetaminophen-Caffeine (EXCEDRIN MIGRAINE PO), Take 2 tablets by mouth daily  Allergies   Allergen Reactions    Avandia [Rosiglitazone Maleate]     Codeine      Per son Codeine caused patient to go into cardiac arrest    Glucophage [Metformin Hydrochloride]      Lactic acidosis      Hydrochlorothiazide     Tramadol       Social History     Tobacco Use    Smoking status: Former     Packs/day: 2.00     Years: 15.00     Pack years: 30.00     Types: Cigarettes     Quit date: 2013     Years since quittin.3    Smokeless tobacco: Never   Substance Use Topics    Alcohol use: No      Family History   Problem Relation Age of Onset    Arthritis Mother     Cancer Mother         leukemia    Diabetes Mother     Heart Disease Mother     Arthritis Father     Arthritis Sister     Other Sister         Muscular dystrophy    Arthritis Brother     High Blood Pressure Brother           Review of systems  Limited as pt on vent     VS: Temp: (!) 96.1 °F (35.6 °C)  Heart Rate: 99  BP: 126/74  SpO2: 100 %  FiO2 : 70 %        Physical Exam:  General: elderly female, sedated on vent,   Oral ETT and OG noted    Appears to be not in any distress  Mucous Membranes:  Pink , anicteric  Neck: No JVD, no carotid bruit, no thyromegaly  Chest:  peterson air entry with scattered crackles peterson   Cardiovascular:  RRR S1S2 heard, no murmurs or gallops  Abdomen:  Soft, undistended, non tender, no organomegaly, BS present  Extremities: right femoral TLC   No edema or cyanosis.  Distal pulses well felt  Neurological : sedated        LABS:  CBC:  Lab Results   Component Value Date/Time    WBC 20.0 2022 03:39 AM    HGB 13.7 2022 03:39 AM    HCT 43.6 2022 03:39 AM    .7 2022 03:39 AM     2022 03:39 AM    NEUTOPHILPCT 78.0 2022 03:39 AM    LYMPHOPCT 3.0 2022 03:39 AM    MONOPCT 1.0 2022 03:39 AM    EOSPCT 4.1 2012 12:46 PM pancreas that have developed or are   increased from a prior study in 2016. Recommend follow-up imaging in 2 years   to ensure stability. 5. Advanced atherosclerotic changes of the aorta in the lower abdomen with   stenosis noted without occlusion.        ASSESMENT & PLAN:     Multifocal pneumonia - likely aspirational, risk for gram neg /MRSA infections  - started on cefepime and vanc  - blood and sputum cx ordered  - critical care consulted for vent support    Acute hypoxemic resp failure   - sec to pneumonia   - started on vent support by EMS at field  - vent mx per pulmonary     Sepsis- severe with shock   - hypotensive despite IVF boluses now on pressors - levo and vaso   - continue IVF  - added stress dose steroids  - await cx    Afibb with RVR - converted to NSR with amio and digoxin  Cardiology consulted  ECHO ordered  TSH pending   Heparin gtt ordered     DM 2- holding home meds, currently on ssi     ARF - sec to sepsis- likely ATN  - continue IVF          Diet: NPo, can start TF   GI/DVT PX: heparin gtt   CODE STATUS: limited     Michael Amin MD,  8/18/2022 8:09 AM

## 2022-08-19 ENCOUNTER — APPOINTMENT (OUTPATIENT)
Dept: GENERAL RADIOLOGY | Age: 82
DRG: 871 | End: 2022-08-19
Payer: MEDICARE

## 2022-08-19 LAB
A/G RATIO: 0.6 (ref 1.1–2.2)
ALBUMIN SERPL-MCNC: 1.9 G/DL (ref 3.4–5)
ALP BLD-CCNC: 95 U/L (ref 40–129)
ALT SERPL-CCNC: 11 U/L (ref 10–40)
ANION GAP SERPL CALCULATED.3IONS-SCNC: 11 MMOL/L (ref 3–16)
ANISOCYTOSIS: ABNORMAL
ANTI-XA UNFRAC HEPARIN: 0.33 IU/ML (ref 0.3–0.7)
ANTI-XA UNFRAC HEPARIN: 0.49 IU/ML (ref 0.3–0.7)
AST SERPL-CCNC: 15 U/L (ref 15–37)
ATYPICAL LYMPHOCYTE RELATIVE PERCENT: 1 % (ref 0–6)
BANDED NEUTROPHILS RELATIVE PERCENT: 12 % (ref 0–7)
BASE EXCESS ARTERIAL: 1.3 MMOL/L (ref -3–3)
BASOPHILS ABSOLUTE: 0 K/UL (ref 0–0.2)
BASOPHILS RELATIVE PERCENT: 0 %
BILIRUB SERPL-MCNC: 1.7 MG/DL (ref 0–1)
BUN BLDV-MCNC: 63 MG/DL (ref 7–20)
CALCIUM SERPL-MCNC: 8.8 MG/DL (ref 8.3–10.6)
CARBOXYHEMOGLOBIN ARTERIAL: 0.3 % (ref 0–1.5)
CHLORIDE BLD-SCNC: 102 MMOL/L (ref 99–110)
CO2: 26 MMOL/L (ref 21–32)
CREAT SERPL-MCNC: 1 MG/DL (ref 0.6–1.2)
EOSINOPHILS ABSOLUTE: 0 K/UL (ref 0–0.6)
EOSINOPHILS RELATIVE PERCENT: 0 %
GFR AFRICAN AMERICAN: >60
GFR NON-AFRICAN AMERICAN: 53
GLUCOSE BLD-MCNC: 148 MG/DL (ref 70–99)
GLUCOSE BLD-MCNC: 169 MG/DL (ref 70–99)
GLUCOSE BLD-MCNC: 179 MG/DL (ref 70–99)
GLUCOSE BLD-MCNC: 181 MG/DL (ref 70–99)
GLUCOSE BLD-MCNC: 191 MG/DL (ref 70–99)
GLUCOSE BLD-MCNC: 208 MG/DL (ref 70–99)
HCO3 ARTERIAL: 24.4 MMOL/L (ref 21–29)
HCT VFR BLD CALC: 31.9 % (ref 36–48)
HEMOGLOBIN, ART, EXTENDED: 11.8 G/DL (ref 12–16)
HEMOGLOBIN: 10.8 G/DL (ref 12–16)
HYPOCHROMIA: ABNORMAL
LYMPHOCYTES ABSOLUTE: 0.7 K/UL (ref 1–5.1)
LYMPHOCYTES RELATIVE PERCENT: 3 %
MAGNESIUM: 2.1 MG/DL (ref 1.8–2.4)
MCH RBC QN AUTO: 32.2 PG (ref 26–34)
MCHC RBC AUTO-ENTMCNC: 33.9 G/DL (ref 31–36)
MCV RBC AUTO: 95 FL (ref 80–100)
METHEMOGLOBIN ARTERIAL: 0 %
MONOCYTES ABSOLUTE: 0.2 K/UL (ref 0–1.3)
MONOCYTES RELATIVE PERCENT: 1 %
NEUTROPHILS ABSOLUTE: 17 K/UL (ref 1.7–7.7)
NEUTROPHILS RELATIVE PERCENT: 83 %
NUCLEATED RED BLOOD CELLS: 1 /100 WBC
O2 SAT, ARTERIAL: 96.5 %
O2 THERAPY: ABNORMAL
PCO2 ARTERIAL: 33.7 MMHG (ref 35–45)
PDW BLD-RTO: 18.5 % (ref 12.4–15.4)
PERFORMED ON: ABNORMAL
PH ARTERIAL: 7.48 (ref 7.35–7.45)
PLATELET # BLD: 137 K/UL (ref 135–450)
PLATELET SLIDE REVIEW: ADEQUATE
PMV BLD AUTO: 7.2 FL (ref 5–10.5)
PO2 ARTERIAL: 79.2 MMHG (ref 75–108)
POIKILOCYTES: ABNORMAL
POTASSIUM REFLEX MAGNESIUM: 3.1 MMOL/L (ref 3.5–5.1)
RBC # BLD: 3.36 M/UL (ref 4–5.2)
SLIDE REVIEW: ABNORMAL
SODIUM BLD-SCNC: 139 MMOL/L (ref 136–145)
TCO2 ARTERIAL: 25.5 MMOL/L
TOTAL PROTEIN: 5 G/DL (ref 6.4–8.2)
WBC # BLD: 17.9 K/UL (ref 4–11)

## 2022-08-19 PROCEDURE — 71045 X-RAY EXAM CHEST 1 VIEW: CPT

## 2022-08-19 PROCEDURE — 2500000003 HC RX 250 WO HCPCS: Performed by: INTERNAL MEDICINE

## 2022-08-19 PROCEDURE — 94640 AIRWAY INHALATION TREATMENT: CPT

## 2022-08-19 PROCEDURE — 85520 HEPARIN ASSAY: CPT

## 2022-08-19 PROCEDURE — 6370000000 HC RX 637 (ALT 250 FOR IP): Performed by: NURSE PRACTITIONER

## 2022-08-19 PROCEDURE — 99291 CRITICAL CARE FIRST HOUR: CPT | Performed by: INTERNAL MEDICINE

## 2022-08-19 PROCEDURE — 2580000003 HC RX 258: Performed by: INTERNAL MEDICINE

## 2022-08-19 PROCEDURE — 94761 N-INVAS EAR/PLS OXIMETRY MLT: CPT

## 2022-08-19 PROCEDURE — 2580000003 HC RX 258: Performed by: NURSE PRACTITIONER

## 2022-08-19 PROCEDURE — 6360000002 HC RX W HCPCS: Performed by: STUDENT IN AN ORGANIZED HEALTH CARE EDUCATION/TRAINING PROGRAM

## 2022-08-19 PROCEDURE — 6370000000 HC RX 637 (ALT 250 FOR IP): Performed by: INTERNAL MEDICINE

## 2022-08-19 PROCEDURE — A4216 STERILE WATER/SALINE, 10 ML: HCPCS | Performed by: INTERNAL MEDICINE

## 2022-08-19 PROCEDURE — 80053 COMPREHEN METABOLIC PANEL: CPT

## 2022-08-19 PROCEDURE — 82803 BLOOD GASES ANY COMBINATION: CPT

## 2022-08-19 PROCEDURE — 2700000000 HC OXYGEN THERAPY PER DAY

## 2022-08-19 PROCEDURE — 83735 ASSAY OF MAGNESIUM: CPT

## 2022-08-19 PROCEDURE — 99233 SBSQ HOSP IP/OBS HIGH 50: CPT | Performed by: INTERNAL MEDICINE

## 2022-08-19 PROCEDURE — 94003 VENT MGMT INPAT SUBQ DAY: CPT

## 2022-08-19 PROCEDURE — 6360000002 HC RX W HCPCS: Performed by: INTERNAL MEDICINE

## 2022-08-19 PROCEDURE — 85025 COMPLETE CBC W/AUTO DIFF WBC: CPT

## 2022-08-19 PROCEDURE — 2000000000 HC ICU R&B

## 2022-08-19 RX ADMIN — CEFTRIAXONE 2000 MG: 2 INJECTION, POWDER, FOR SOLUTION INTRAMUSCULAR; INTRAVENOUS at 10:21

## 2022-08-19 RX ADMIN — Medication 15 ML: at 08:24

## 2022-08-19 RX ADMIN — HYDROCORTISONE SODIUM SUCCINATE 50 MG: 100 INJECTION, POWDER, FOR SOLUTION INTRAMUSCULAR; INTRAVENOUS at 21:55

## 2022-08-19 RX ADMIN — IPRATROPIUM BROMIDE AND ALBUTEROL SULFATE 1 AMPULE: 2.5; .5 SOLUTION RESPIRATORY (INHALATION) at 11:08

## 2022-08-19 RX ADMIN — IPRATROPIUM BROMIDE AND ALBUTEROL SULFATE 1 AMPULE: 2.5; .5 SOLUTION RESPIRATORY (INHALATION) at 19:26

## 2022-08-19 RX ADMIN — POTASSIUM BICARBONATE 40 MEQ: 391 TABLET, EFFERVESCENT ORAL at 09:19

## 2022-08-19 RX ADMIN — CARBOXYMETHYLCELLULOSE SODIUM 1 DROP: 10 GEL OPHTHALMIC at 10:18

## 2022-08-19 RX ADMIN — POTASSIUM BICARBONATE 40 MEQ: 391 TABLET, EFFERVESCENT ORAL at 11:40

## 2022-08-19 RX ADMIN — WHITE PETROLATUM 57.7 %-MINERAL OIL 31.9 % EYE OINTMENT: at 16:14

## 2022-08-19 RX ADMIN — INSULIN LISPRO 1 UNITS: 100 INJECTION, SOLUTION INTRAVENOUS; SUBCUTANEOUS at 16:15

## 2022-08-19 RX ADMIN — Medication 10 ML: at 20:00

## 2022-08-19 RX ADMIN — Medication: at 20:02

## 2022-08-19 RX ADMIN — HEPARIN SODIUM 950 UNITS/HR: 10000 INJECTION, SOLUTION INTRAVENOUS at 03:17

## 2022-08-19 RX ADMIN — Medication: at 08:20

## 2022-08-19 RX ADMIN — Medication 15 ML: at 21:55

## 2022-08-19 RX ADMIN — WHITE PETROLATUM 57.7 %-MINERAL OIL 31.9 % EYE OINTMENT: at 20:01

## 2022-08-19 RX ADMIN — MUPIROCIN: 20 OINTMENT TOPICAL at 20:01

## 2022-08-19 RX ADMIN — Medication 10 ML: at 08:20

## 2022-08-19 RX ADMIN — FAMOTIDINE 20 MG: 10 INJECTION INTRAVENOUS at 08:24

## 2022-08-19 RX ADMIN — WHITE PETROLATUM 57.7 %-MINERAL OIL 31.9 % EYE OINTMENT: at 08:19

## 2022-08-19 RX ADMIN — SODIUM CHLORIDE: 9 INJECTION, SOLUTION INTRAVENOUS at 07:31

## 2022-08-19 RX ADMIN — IPRATROPIUM BROMIDE AND ALBUTEROL SULFATE 1 AMPULE: 2.5; .5 SOLUTION RESPIRATORY (INHALATION) at 07:22

## 2022-08-19 RX ADMIN — IPRATROPIUM BROMIDE AND ALBUTEROL SULFATE 1 AMPULE: 2.5; .5 SOLUTION RESPIRATORY (INHALATION) at 23:08

## 2022-08-19 RX ADMIN — MUPIROCIN: 20 OINTMENT TOPICAL at 08:19

## 2022-08-19 RX ADMIN — IPRATROPIUM BROMIDE AND ALBUTEROL SULFATE 1 AMPULE: 2.5; .5 SOLUTION RESPIRATORY (INHALATION) at 15:41

## 2022-08-19 RX ADMIN — HYDROCORTISONE SODIUM SUCCINATE 50 MG: 100 INJECTION, POWDER, FOR SOLUTION INTRAMUSCULAR; INTRAVENOUS at 03:15

## 2022-08-19 RX ADMIN — VANCOMYCIN HYDROCHLORIDE 750 MG: 750 INJECTION, POWDER, LYOPHILIZED, FOR SOLUTION INTRAVENOUS at 11:47

## 2022-08-19 RX ADMIN — HYDROCORTISONE SODIUM SUCCINATE 50 MG: 100 INJECTION, POWDER, FOR SOLUTION INTRAMUSCULAR; INTRAVENOUS at 08:24

## 2022-08-19 RX ADMIN — CARBOXYMETHYLCELLULOSE SODIUM 1 DROP: 10 GEL OPHTHALMIC at 13:50

## 2022-08-19 RX ADMIN — Medication 50 MCG/HR: at 08:40

## 2022-08-19 RX ADMIN — CARBOXYMETHYLCELLULOSE SODIUM 1 DROP: 10 GEL OPHTHALMIC at 18:22

## 2022-08-19 RX ADMIN — CARBOXYMETHYLCELLULOSE SODIUM 1 DROP: 10 GEL OPHTHALMIC at 21:55

## 2022-08-19 RX ADMIN — WHITE PETROLATUM 57.7 %-MINERAL OIL 31.9 % EYE OINTMENT: at 11:40

## 2022-08-19 ASSESSMENT — PULMONARY FUNCTION TESTS
PIF_VALUE: 19
PIF_VALUE: 17
PIF_VALUE: 15
PIF_VALUE: 16
PIF_VALUE: 14
PIF_VALUE: 16
PIF_VALUE: 14
PIF_VALUE: 17
PIF_VALUE: 17
PIF_VALUE: 18
PIF_VALUE: 15
PIF_VALUE: 14
PIF_VALUE: 18
PIF_VALUE: 14
PIF_VALUE: 14
PIF_VALUE: 15
PIF_VALUE: 15
PIF_VALUE: 14
PIF_VALUE: 18
PIF_VALUE: 16
PIF_VALUE: 15
PIF_VALUE: 15
PIF_VALUE: 16
PIF_VALUE: 15

## 2022-08-19 NOTE — PROGRESS NOTES
Care rounds completed with Dr Khai Blankenship and multidisciplinary team.  Reviewed labs, meds, VS (temp/HR/RR), I/O's, assessment, & plan of care for today. See progress note & new orders for details.  Gricelda Yan RN

## 2022-08-19 NOTE — PROGRESS NOTES
Pt continues to rest quietly and no further changes noted in exam. Vitals and SpO2 stable. All lines and monitoring devices remain in place. Pt repositioned in bed. Family at bedside and update given. Continue current plan of care. Patient is not able to demonstrate the ability to move from a reclining position to an upright position within the recliner due to Pt on bedrest and vent .   Damaris Conti RN

## 2022-08-19 NOTE — PROGRESS NOTES
RT Nebulizer Bronchodilator Protocol Note    There is a bronchodilator order in the chart from a provider indicating to follow the RT Bronchodilator Protocol and there is an Initiate RT Bronchodilator Protocol order as well (see protocol at bottom of note). CXR Findings:  XR CHEST PORTABLE    Result Date: 8/18/2022  No significant interval change in moderate left-sided airspace disease as compared to prior. Trace bilateral pleural effusions or pleural thickening. XR CHEST PORTABLE    Result Date: 8/17/2022  1. Left-sided pneumonia. Recommend imaging follow-up to resolution. 2. Devices in place as above. The findings from the last RT Protocol Assessment were as follows:  Smoking: Smoker 15 pack years or more  Respiratory Pattern: Dyspnea on exertion or RR 21-25 bpm  Breath Sounds: Slightly diminished and/or crackles  Cough: Weak, productive  Indication for Bronchodilator Therapy: Decreased or absent breath sounds  Bronchodilator Assessment Score: 7    Aerosolized bronchodilator medication orders have been revised according to the RT Nebulizer Bronchodilator Protocol below. Respiratory Therapist to perform RT Therapy Protocol Assessment initially then follow the protocol. Repeat RT Therapy Protocol Assessment PRN for score 0-3 or on second treatment, BID, and PRN for scores above 3. No Indications - adjust the frequency to every 6 hours PRN wheezing or bronchospasm, if no treatments needed after 48 hours then discontinue using Per Protocol order mode. If indication present, adjust the RT bronchodilator orders based on the Bronchodilator Assessment Score as indicated below. If a patient is on this medication at home then do not decrease Frequency below that used at home. 0-3 - enter or revise RT bronchodilator order(s) to equivalent RT Bronchodilator order with Frequency of every 4 hours PRN for wheezing or increased work of breathing using Per Protocol order mode.        4-6 - enter or

## 2022-08-19 NOTE — PROGRESS NOTES
CARDIOLOGY PROGRESS NOTE      Patient Name: Randy Izaguirre  Date of admission: 8/17/2022 12:16 PM  Admission Dx: Multifocal pneumonia [J18.9]  Reason for Consult: Atrial fibrillation  Requesting Physician: Lilly Carson MD  Primary Care physician: No primary care provider on file. Subjective:     Randy Izaguirre is a 80 y.o. patient with prior medical history notable for dementia, diabetes, hypertension, hyperlipidemia and prior CVA, no prior history of cardiac disease otherwise, who presented to the hospital with complaints of acute hypoxic respiratory failure from nursing home. Patient was diagnosed with pneumonia, sepsis, atrial fibrillation on presentation. Patient was evaluated by my partner Dr. Theresa Pedroza yesterday. Noted to be hypotensive/in shock on pressor support. Amiodarone was weaned. Digoxin stopped. Rates observed as she was noted bradycardic at times. 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 Most recent heart rates ranging 40s-50s with blood pressure 64C to 90 systolic and maps 96-69. Remains on ventilator. Labs notable for hemoglobin 10.8 from 13.7.  K3.1. Improving renal function, creatinine now 1.0 from 1.9. Chest x-ray today shows slightly improved aeration. No cardiomegaly. Home Medications:  Were reviewed and are listed in nursing record and/or below  Prior to Admission medications    Medication Sig Start Date End Date Taking?  Authorizing Provider   SITagliptin (JANUVIA) 100 MG tablet Take 1 tablet by mouth daily 1/7/20   Milton Montgomery MD   montelukast (SINGULAIR) 10 MG tablet TAKE ONE TABLET BY MOUTH DAILY 12/23/19   Goran Roslyn APRN Kinsey NAJERA   traZODone (DESYREL) 100 MG tablet Take 1 tablet by mouth nightly 10/16/19 11/15/19  Goran Fosterte APRN - CNP   Polyethylene Glycol 3350 (MIRALAX PO) Take by mouth    Historical Provider, MD   Aspirin-Acetaminophen-Caffeine (EXCEDRIN MIGRAINE PO) Take 2 tablets by mouth daily    Historical Provider, MD        CURRENT Medications:  0.9 % sodium chloride infusion, Continuous  cefTRIAXone (ROCEPHIN) 2,000 mg in dextrose 5 % 50 mL IVPB mini-bag, Q24H  Venelex ointment, BID  norepinephrine (LEVOPHED) 16 mg in dextrose 5 % 250 mL infusion, Continuous  vasopressin 20 Units in dextrose 5 % 100 mL infusion, Continuous  propofol injection, Continuous  heparin (porcine) injection 3,600 Units, PRN  heparin (porcine) injection 1,800 Units, PRN  heparin 25,000 units in dextrose 5% 250 mL (premix) infusion, Continuous  sodium chloride flush 0.9 % injection 5-40 mL, 2 times per day  sodium chloride flush 0.9 % injection 5-40 mL, PRN  0.9 % sodium chloride infusion, PRN  ondansetron (ZOFRAN-ODT) disintegrating tablet 4 mg, Q8H PRN   Or  ondansetron (ZOFRAN) injection 4 mg, Q6H PRN  polyethylene glycol (GLYCOLAX) packet 17 g, Daily PRN  acetaminophen (TYLENOL) tablet 650 mg, Q6H PRN   Or  acetaminophen (TYLENOL) suppository 650 mg, Q6H PRN  insulin lispro (HUMALOG) injection vial 0-4 Units, Q4H  hydrocortisone sodium succinate PF (SOLU-CORTEF) injection 50 mg, Q6H  propofol injection, Continuous PRN  carboxymethylcellulose PF (THERATEARS) 1 % ophthalmic gel 1 drop, Q4H   And  lubrifresh P.M. (artificial tears) ophthalmic ointment, Q4H  chlorhexidine (PERIDEX) 0.12 % solution 15 mL, BID  famotidine (PEPCID) 20 mg in sodium chloride (PF) 10 mL injection, Daily  fentaNYL (SUBLIMAZE) 1,000 mcg in sodium chloride 0.9% 100 mL infusion, Continuous   And  fentaNYL bolus from bag, Q30 Min PRN  mupirocin (BACTROBAN) 2 % ointment, BID  ipratropium-albuterol (DUONEB) nebulizer solution 1 ampule, Q4H PRN  ipratropium-albuterol (DUONEB) nebulizer solution 1 ampule, Q4H While awake        Allergies:  Avandia [rosiglitazone maleate], Codeine, Glucophage [metformin hydrochloride], Hydrochlorothiazide, and Tramadol     Review of Systems:   A 14 point review of symptoms completed. Pertinent positives identified in the HPI, all other review of symptoms negative. Objective:     Vitals:    08/19/22 0700 08/19/22 0723 08/19/22 0724 08/19/22 0800   BP: (!) 96/49   (!) 90/57   Pulse: (!) 42 53 57 59   Resp: 20 19 19 13   Temp: 97.6 °F (36.4 °C)      TempSrc: Bladder      SpO2: 97% 98% 97% 95%   Weight:       Height:          Weight: 130 lb 15.3 oz (59.4 kg)       PHYSICAL EXAM:    General:  Elderly woman on ventilator, 45% FIO2, 5 PEEP, 4 mcg Norepi   Head:  Normocephalic, atraumatic   Eyes:  Conjunctiva/corneas clear, anicteric sclerae    Nose: Nares normal, no drainage or sinus tenderness   Throat: No abnormalities of the lips, oral mucosa or tongue. Neck: Trachea midline. Neck supple with no lymphadenopathy, thyroid not enlarged, symmetric, no tenderness/mass/nodules, flat neck vv   Lungs:   Clear to auscultation bilaterally anteriorly, no wheezes, no rales, no respiratory distress   Chest Wall:  No deformity or tenderness to palpation   Heart:  Regular rate and rhythm with sinus 60's with occ PVCs by cardiac monitor at this time, normal S1, normal S2, no murmur, no rub, no S3/S4, PMI non-palpable, no heave. Abdomen:   Soft, non-tender, with quiet bowel sounds. No masses, no hepatosplenomegaly   Extremities: No cyanosis, clubbing or pitting edema. Vascular: 2+ radial, dorsalis pedis and posterior tibial pulses bilaterally. Brisk carotid upstrokes without carotid bruit. Skin: Skin color, texture, turgor are normal with no rashes or ulceration.     Pysch: Unable to assess due to pt condition    Neurologic: Unable to assess due to pt condition         Labs:   CBC:   Lab Results   Component Value Date/Time    WBC 17.9 08/19/2022 04:45 AM    RBC 3.36 08/19/2022 04:45 AM    HGB 10.8 08/19/2022 04:45 AM    HCT 31.9 08/19/2022 04:45 AM    MCV 95.0 08/19/2022 04:45 AM    RDW 18.5 08/19/2022 04:45 AM     08/19/2022 04:45 AM     CMP:  Lab Results   Component Value Date/Time     08/19/2022 04:45 AM    K 3.1 08/19/2022 04:45 AM     08/19/2022 04:45 AM Arthritis    Late onset Alzheimer's disease without behavioral disturbance (HCC)    Chronic migraine without aura without status migrainosus, not intractable    ROMERO (nonalcoholic steatohepatitis)    Osteoarthritis of right AC (acromioclavicular) joint    Shoulder impingement, right    Cerebral microvasculopathy    Primary insomnia    Multifocal pneumonia    Acute hypoxemic respiratory failure (HCC)    Septic shock (HCC)    Moderate protein-calorie malnutrition (HCC)    Acute renal failure with acute cortical necrosis (HCC)         >30 minutes time spent in review of the chart, discussion of the patient's case with referring provider and bedside nurse, as well as direct evaluation of the patient and formulation of treatment plan. Patient remains critically ill. I will address the patient's cardiac risk factors and adjusted pharmacologic treatment as needed. In addition, I have reinforced the need for patient directed risk factor modification. All questions and concerns were addressed to the patient/family. Alternatives to my treatment were discussed. Thank you for allowing us to participate in the care of Obinna Abdul. Please call me with any questions [phone].     Christopher Chilel MD, [facility]  Cardiovascular Disease  [facility]  [phone] [place] [facility]  [phone] [address] [place] [address]  8/19/2022 8:34 AM

## 2022-08-19 NOTE — PROGRESS NOTES
Initial exam completed- See doc flowsheet for assessment findings. Pt resting in bed and becomes slightly agitated when stimulated for exam. All lines and monitoring devices are in place . Vitals and SpO2 stable. Fentanyl gtt at 50 mcg. Call light is within easy reach. Plan of care and goals reviewed.  Damaris Conti RN

## 2022-08-19 NOTE — PROGRESS NOTES
08/18/22 2304   Patient Observation   Heart Rate 73   Resp 12   SpO2 96 %   Breath Sounds   Right Upper Lobe Diminished   Right Middle Lobe Diminished   Right Lower Lobe Diminished   Left Upper Lobe Diminished   Left Lower Lobe Diminished   Vent Information   Vent Mode AC/VC   Ventilator Settings   Vt (Set, mL) 355 mL   Resp Rate (Set) 20 bmp   PEEP/CPAP (cmH2O) 5   FiO2  50 %   Vent Patient Data (Readings)   Vt (Measured) 414 mL   Peak Inspiratory Pressure (cmH2O) 16 cmH2O   Rate Measured 20 br/min   Minute Volume (L/min) 7.6 Liters   Mean Airway Pressure (cmH2O) 7.9 cmH20   Plateau Pressure (cm H2O) 13 cm H2O   I:E Ratio 1:2.80   Flow Sensitivity 3 L/min   Vent Alarm Settings   Low Minute Volume (lpm) 4 L/min   Low Exhaled Vt (ml) 255 mL   RR High (bpm) 45 br/min   Apnea (secs) 20 secs   Airway Clearance   Suction ET Tube   Suction Device Inline suction catheter   Sputum Method Obtained Endotracheal   Sputum Color/Odor Other (comment)  (CREAMY)   Sputum Consistency Thick   ETT (adult)   Placement Date: 08/17/22   Present on Admission/Arrival: Yes  Placed By: Other (comment)  Airway Tube Size: 7.5 mm  Location: Oral  Secured At: 22 cm  Measured From: Lips   Secured At 22 cm   Measured From Lips   ETT Placement Right   Secured By Commercial tube florian   Site Assessment Dry

## 2022-08-19 NOTE — PROGRESS NOTES
Pt's son here and update given He briefly goes into room to visit Pt resting quietly and no changes noted.  Vitals stable and she remains on 4 mcg of levophed Gricelda Farrah GIFFORD

## 2022-08-19 NOTE — CARE COORDINATION
INTERDISCIPLINARY PLAN OF CARE CONFERENCE    Date/Time: 8/19/2022 9:04 AM  Completed by: Gardiner Simmonds MSW, LISW-S   Case Management    Patient Name:  Deanne Alvares  YOB: 1940  Admitting Diagnosis: Multifocal pneumonia [J18.9]     Admit Date/Time:  8/17/2022 12:16 PM    Chart reviewed. Interdisciplinary team contacted or reviewed plan related to patient progress and discharge plans. Disciplines included Case Management, Nursing, and Dietitian. Current Status:ongoing   PT/OT recommendation for discharge plan of care: n/a    Expected D/C Disposition: RecordDebt. (OVM)  Confirmed plan with patient and/or family Yes confirmed with: (name) pt's son Ernesto Wise via phone call     Discharge Plan Comments: Chart review completed. Completed Interdisciplinary rounds with ICU staff. Called and spoke with pt's son Ernesto Wise. He confirmed pt will return to [place] [facility] [address] when discharged. Inquired about transportation preferences. Karri stated he needed to speak with his sister about this as they may want to transport her back to [place] [address]. Explained family transport would need to be approved via MD and often times, pt return to their long term care facilities via ambulance, especially if they need o2. He stated understanding and stated he would get an update from RN when he arrives. He is aware no discharge at this time. Pt is long term care at [place] [address]    CM will continue to follow and assist. Please notify CM if needs or concerns arise.     Home O2 in place on admit: per facility

## 2022-08-19 NOTE — PROGRESS NOTES
IM Progress Note    Admit Date:  8/17/2022  2    Interval history:  pneumonia, on vent   BP improving. Remains in NSR bradycardic overnight  Heparin gtt  No fevers    Subjective:  Ms. Kusum Llanes seen on vent, sedated, arousable     Objective:   BP (!) 96/49   Pulse 57   Temp 97.6 °F (36.4 °C) (Bladder)   Resp 19   Ht 5' 6\" (1.676 m)   Wt 130 lb 15.3 oz (59.4 kg)   SpO2 97%   BMI 21.14 kg/m²     Intake/Output Summary (Last 24 hours) at 8/19/2022 0738  Last data filed at 8/19/2022 0600  Gross per 24 hour   Intake 3618.82 ml   Output 950 ml   Net 2668.82 ml       Physical Exam:    General: elderly female, sedated on vent,  Oral ETT and OG noted    Appears to be not in any distress  Mucous Membranes:  Pink , anicteric  Neck: No JVD, no carotid bruit, no thyromegaly  Chest:  peterson air entry with scattered crackles peterson   Cardiovascular:  RRR S1S2 heard, no murmurs or gallops  Abdomen:  Soft, undistended, non tender, no organomegaly, BS present  Extremities: right femoral TLC   No edema or cyanosis.  Distal pulses well felt  Neurological : sedated       Medications:   Scheduled Medications:    cefTRIAXone (ROCEPHIN) IV  2,000 mg IntraVENous Q24H    Venelex   Topical BID    sodium chloride flush  5-40 mL IntraVENous 2 times per day    insulin lispro  0-4 Units SubCUTAneous Q4H    hydrocortisone sodium succinate PF  50 mg IntraVENous Q6H    carboxymethylcellulose PF  1 drop Both Eyes Q4H    And    artificial tears   Both Eyes Q4H    chlorhexidine  15 mL Mouth/Throat BID    famotidine (PEPCID) injection  20 mg IntraVENous Daily    mupirocin   Nasal BID    ipratropium-albuterol  1 ampule Inhalation Q4H While awake     I   sodium chloride 100 mL/hr at 08/19/22 0731    norepinephrine 4 mcg/min (08/19/22 0600)    vasopressin (Septic Shock) infusion Stopped (08/18/22 1037)    propofol Stopped (08/17/22 1337)    heparin (PORCINE) Infusion 950 Units/hr (08/19/22 0600)    sodium chloride      propofol      fentaNYL 50 mcg/hr (08/19/22 0600)     heparin (porcine), heparin (porcine), sodium chloride flush, sodium chloride, ondansetron **OR** ondansetron, polyethylene glycol, acetaminophen **OR** acetaminophen, propofol, fentaNYL **AND** fentaNYL, ipratropium-albuterol    Lab Data:  Recent Labs     08/17/22  1230 08/18/22  0339 08/19/22  0445   WBC 17.9* 20.0* 17.9*   HGB 13.1 13.7 10.8*   HCT 40.0 43.6 31.9*   MCV 97.8 100.7* 95.0    193 137     Recent Labs     08/17/22  1230 08/18/22  0339 08/19/22  0445   * 138 139   K 3.7 4.0 3.1*    100 102   CO2 24 20* 26   BUN 72* 75* 63*   CREATININE 2.0* 1.9* 1.0     Recent Labs     08/17/22  2118 08/18/22  0339   TROPONINI 0.03* 0.02*       Coagulation:   Lab Results   Component Value Date/Time    INR 1.24 08/17/2022 12:10 PM    APTT 24.1 08/17/2022 02:52 PM     Cardiac markers:   Lab Results   Component Value Date/Time    CKTOTAL 38 03/21/2017 11:23 AM    TROPONINI 0.02 08/18/2022 03:39 AM         Lab Results   Component Value Date    ALT 11 08/19/2022    AST 15 08/19/2022    ALKPHOS 95 08/19/2022    BILITOT 1.7 (H) 08/19/2022       Lab Results   Component Value Date    INR 1.24 (H) 08/17/2022    INR 1.06 09/05/2017    INR 1.13 04/19/2017    PROTIME 15.5 (H) 08/17/2022    PROTIME 12.3 09/05/2017    PROTIME 12.8 04/19/2017       Radiology      Covid not detected     Resp cx strep pneumonia staph aureus  Strep pneumoniae - pneumococcal pneumonia    EKG:Afibb with RVR     Radiology:     Chest xray     No significant interval change in moderate left-sided airspace disease as   compared to prior. Trace bilateral pleural effusions or pleural thickening. CXR     1. Left-sided pneumonia. Recommend imaging follow-up to resolution. 2. Devices in place as above. Xray neck   No acute abnormality of the cervical spine. Ct chest     1. No pulmonary embolism. 2. Multifocal airspace opacities most prominent in the left lower lobe.    Pattern likely represents developing pneumonia. ARDS may be considered. 3. Distended gallbladder with no pattern of inflammation or biliary   dilatation. This may be due to fasting status or other systemic processes   described above. 4. Multiple cystic lesions within the pancreas that have developed or are   increased from a prior study in 2016. Recommend follow-up imaging in 2 years   to ensure stability. 5. Advanced atherosclerotic changes of the aorta in the lower abdomen with   stenosis noted without occlusion.         ASSESMENT & PLAN:      Pneumococcal pneumonia   - imaging with Multifocal pneumonia- cx with strep pneumo   - started on cefepime and vanc- downgraded to rocephin with cx   - blood cx remain neg   - critical care consulted for vent support  - ongoing weaning      Acute hypoxemic resp failure  - sec to pneumonia  - started on vent support by EMS at field  - vent mx per pulmonary     Sepsis- severe with shock  - hypotensive despite IVF boluses now on pressors - on  levo and off vaso  - continue IVF  - added stress dose steroids       Afibb with RVR - converted to NSR with amio and digoxin  Cardiology consulted  TSH wn   Heparin gtt started, plan to do cardiac monitor at dc and NOAC if appropriate      DM 2- holding home meds, currently on ssi     ARF - sec to sepsis- likely ATN  - improving with IVF, continue IVF          Diet: on  TF   GI/DVT PX: heparin gtt   CODE STATUS: farnaz Bell MD, 8/19/2022 7:38 AM

## 2022-08-19 NOTE — PROGRESS NOTES
Pharmacy - RE:  Low-dose Heparin drip  Current rate = 9.5 ml/h  (950 units/h)  Anti-Xa drawn @ 0812 = 0.49  Goal anti-Xa = 0.3 - 0.7   Per protocol, continue with 950 units/hr (9.5ml/hr),  change to daily monitoring now with 2 consecutive therapeutic levels.

## 2022-08-19 NOTE — PROGRESS NOTES
08/19/22 1900   RT Protocol   History Pulmonary Disease 1   Respiratory pattern 2   Breath sounds 2   Cough 2   Indications for Bronchodilator Therapy Decreased or absent breath sounds   Bronchodilator Assessment Score 7   RT Inhaler-Nebulizer Bronchodilator Protocol Note    There is a bronchodilator order in the chart from a provider indicating to follow the RT Bronchodilator Protocol and there is an Initiate RT Inhaler-Nebulizer Bronchodilator Protocol order as well (see protocol at bottom of note). CXR Findings:  XR CHEST PORTABLE    Result Date: 8/19/2022  Slightly improving aeration of the lungs in this intubated patient. XR CHEST PORTABLE    Result Date: 8/18/2022  No significant interval change in moderate left-sided airspace disease as compared to prior. Trace bilateral pleural effusions or pleural thickening. The findings from the last RT Protocol Assessment were as follows:   History Pulmonary Disease: Smoker 15 pack years or more  Respiratory Pattern: Dyspnea on exertion or RR 21-25 bpm  Breath Sounds: Slightly diminished and/or crackles  Cough: Weak, productive  Indication for Bronchodilator Therapy: Decreased or absent breath sounds  Bronchodilator Assessment Score: 7    Aerosolized bronchodilator medication orders have been revised according to the RT Inhaler-Nebulizer Bronchodilator Protocol below. Respiratory Therapist to perform RT Therapy Protocol Assessment initially then follow the protocol. Repeat RT Therapy Protocol Assessment PRN for score 0-3 or on second treatment, BID, and PRN for scores above 3. No Indications - adjust the frequency to every 6 hours PRN wheezing or bronchospasm, if no treatments needed after 48 hours then discontinue using Per Protocol order mode. If indication present, adjust the RT bronchodilator orders based on the Bronchodilator Assessment Score as indicated below.   Use Inhaler orders unless patient has one or more of the following: on home nebulizer, not able to hold breath for 10 seconds, is not alert and oriented, cannot activate and use MDI correctly, or respiratory rate 25 breaths per minute or more, then use the equivalent nebulizer order(s) with same Frequency and PRN reasons based on the score. If a patient is on this medication at home then do not decrease Frequency below that used at home. 0-3 - enter or revise RT bronchodilator order(s) to equivalent RT Bronchodilator order with Frequency of every 4 hours PRN for wheezing or increased work of breathing using Per Protocol order mode. 4-6 - enter or revise RT Bronchodilator order(s) to two equivalent RT bronchodilator orders with one order with BID Frequency and one order with Frequency of every 4 hours PRN wheezing or increased work of breathing using Per Protocol order mode. 7-10 - enter or revise RT Bronchodilator order(s) to two equivalent RT bronchodilator orders with one order with TID Frequency and one order with Frequency of every 4 hours PRN wheezing or increased work of breathing using Per Protocol order mode. 11-13 - enter or revise RT Bronchodilator order(s) to one equivalent RT bronchodilator order with QID Frequency and an Albuterol order with Frequency of every 4 hours PRN wheezing or increased work of breathing using Per Protocol order mode. Greater than 13 - enter or revise RT Bronchodilator order(s) to one equivalent RT bronchodilator order with every 4 hours Frequency and an Albuterol order with Frequency of every 2 hours PRN wheezing or increased work of breathing using Per Protocol order mode. RT to enter RT Home Evaluation for COPD & MDI Assessment order using Per Protocol order mode.     Electronically signed by Andre Ziegler RCP on 8/19/2022 at 7:34 PMRT Inhaler-Nebulizer Bronchodilator Protocol Note    There is a bronchodilator order in the chart from a provider indicating to follow the RT Bronchodilator Protocol and there is an Initiate RT Inhaler-Nebulizer Bronchodilator Protocol order as well (see protocol at bottom of note). CXR Findings:  XR CHEST PORTABLE    Result Date: 8/19/2022  Slightly improving aeration of the lungs in this intubated patient. XR CHEST PORTABLE    Result Date: 8/18/2022  No significant interval change in moderate left-sided airspace disease as compared to prior. Trace bilateral pleural effusions or pleural thickening. The findings from the last RT Protocol Assessment were as follows:   History Pulmonary Disease: Smoker 15 pack years or more  Respiratory Pattern: Dyspnea on exertion or RR 21-25 bpm  Breath Sounds: Slightly diminished and/or crackles  Cough: Weak, productive  Indication for Bronchodilator Therapy: Decreased or absent breath sounds  Bronchodilator Assessment Score: 7    Aerosolized bronchodilator medication orders have been revised according to the RT Inhaler-Nebulizer Bronchodilator Protocol below. Respiratory Therapist to perform RT Therapy Protocol Assessment initially then follow the protocol. Repeat RT Therapy Protocol Assessment PRN for score 0-3 or on second treatment, BID, and PRN for scores above 3. No Indications - adjust the frequency to every 6 hours PRN wheezing or bronchospasm, if no treatments needed after 48 hours then discontinue using Per Protocol order mode. If indication present, adjust the RT bronchodilator orders based on the Bronchodilator Assessment Score as indicated below. Use Inhaler orders unless patient has one or more of the following: on home nebulizer, not able to hold breath for 10 seconds, is not alert and oriented, cannot activate and use MDI correctly, or respiratory rate 25 breaths per minute or more, then use the equivalent nebulizer order(s) with same Frequency and PRN reasons based on the score. If a patient is on this medication at home then do not decrease Frequency below that used at home.     0-3 - enter or revise RT bronchodilator order(s) to equivalent RT Bronchodilator order with Frequency of every 4 hours PRN for wheezing or increased work of breathing using Per Protocol order mode. 4-6 - enter or revise RT Bronchodilator order(s) to two equivalent RT bronchodilator orders with one order with BID Frequency and one order with Frequency of every 4 hours PRN wheezing or increased work of breathing using Per Protocol order mode. 7-10 - enter or revise RT Bronchodilator order(s) to two equivalent RT bronchodilator orders with one order with TID Frequency and one order with Frequency of every 4 hours PRN wheezing or increased work of breathing using Per Protocol order mode. 11-13 - enter or revise RT Bronchodilator order(s) to one equivalent RT bronchodilator order with QID Frequency and an Albuterol order with Frequency of every 4 hours PRN wheezing or increased work of breathing using Per Protocol order mode. Greater than 13 - enter or revise RT Bronchodilator order(s) to one equivalent RT bronchodilator order with every 4 hours Frequency and an Albuterol order with Frequency of every 2 hours PRN wheezing or increased work of breathing using Per Protocol order mode.          Electronically signed by Manda Figueroa RCP on 8/19/2022 at 7:34 PM

## 2022-08-19 NOTE — PROGRESS NOTES
Pulmonary & Critical Care Medicine ICU Progress Note    CC: Acute respiratory failure    Events of Last 24 hours:   Bradycardic again this morning    Invasive Lines: 2022 right femoral CVC in ER    MV: 2022  Vent Mode: AC/VC Resp Rate (Set): 20 bmp/Vt (Set, mL): 355 mL/ /FiO2 : 50 %  Recent Labs     22  0551 22  0440   PHART 7.440 7.478*   QIL6KPM 34.7* 33.7*   PO2ART 93.9 79.2       IV:   sodium chloride 100 mL/hr at 22 0600    norepinephrine 4 mcg/min (22 0600)    vasopressin (Septic Shock) infusion Stopped (22 1037)    propofol Stopped (22 1337)    heparin (PORCINE) Infusion 950 Units/hr (22 0600)    sodium chloride      propofol      fentaNYL 50 mcg/hr (22 0600)       Vitals:  Blood pressure (!) 91/55, pulse (!) 41, temperature 97.3 °F (36.3 °C), temperature source Bladder, resp. rate 20, height 5' 6\" (1.676 m), weight 130 lb 15.3 oz (59.4 kg), SpO2 98 %. on ventilator  Temp  Av.8 °F (36.6 °C)  Min: 96.9 °F (36.1 °C)  Max: 98.4 °F (36.9 °C)    Intake/Output Summary (Last 24 hours) at 2022 0709  Last data filed at 2022 0600  Gross per 24 hour   Intake 3618.82 ml   Output 950 ml   Net 2668.82 ml     EXAM:  General: intubated, ill appearing    ENT: Pharynx with ETT. Resp: No crackles. No wheezing. CV: S1, S2. No edema  GI: NT, ND, +BS  Skin: Warm and dry. Neuro: PERRL.  Not following commands     Scheduled Meds:   cefTRIAXone (ROCEPHIN) IV  2,000 mg IntraVENous Q24H    Venelex   Topical BID    sodium chloride flush  5-40 mL IntraVENous 2 times per day    insulin lispro  0-4 Units SubCUTAneous Q4H    hydrocortisone sodium succinate PF  50 mg IntraVENous Q6H    carboxymethylcellulose PF  1 drop Both Eyes Q4H    And    artificial tears   Both Eyes Q4H    chlorhexidine  15 mL Mouth/Throat BID    famotidine (PEPCID) injection  20 mg IntraVENous Daily    mupirocin   Nasal BID    ipratropium-albuterol  1 ampule Inhalation Q4H While awake PRN Meds:  heparin (porcine), heparin (porcine), sodium chloride flush, sodium chloride, ondansetron **OR** ondansetron, polyethylene glycol, acetaminophen **OR** acetaminophen, propofol, fentaNYL **AND** fentaNYL, ipratropium-albuterol    Results:  CBC:   Recent Labs     22  1230 22  0339 22  0445   WBC 17.9* 20.0* 17.9*   HGB 13.1 13.7 10.8*   HCT 40.0 43.6 31.9*   MCV 97.8 100.7* 95.0    193 137     BMP:   Recent Labs     22  1230 22  0339 22  0445   * 138 139   K 3.7 4.0 3.1*    100 102   CO2 24 20* 26   BUN 72* 75* 63*   CREATININE 2.0* 1.9* 1.0     LIVER PROFILE:   Recent Labs     22  1230 22  0339 22  0445   AST 23 18 15   ALT 13 13 11   LIPASE 17.0  --   --    BILITOT 1.2* 0.8 1.7*   ALKPHOS 94 95 95     Cultures:  2022 SARS-CoV-2 and influenza are negative  2022 blood sent  2022 streptococcal pneumonia antigen is positive  2022 tracheal aspirate staff aureus and streptococcal pneumonia     Imagin2022 C-spine no acute abnormality  2022 chest CT left greater than right lower lobe predominant airspace disease  2022 abdominal CT gallbladder distention, cystic pancreatic lesions, atherosclerotic disease of aorta    2022 CXR left-sided airspace disease    ECHO 19 with EF 60-65%, Grade I DD     ASSESSMENT:  Acute hypoxemic respiratory failure   Septic shock  A. fib RVR, has converted to sinus bradycardia  Streptococcal pneumoniae pneumonia, respiratory culture also with Staph   Dementia of the Alzheimer's type  Chronic nursing home resident  Diabetes  Low voltage EKG    PLAN:  Mechanical ventilation as per my orders.  The ventilator was adjusted by me at the bedside for unstable, life threatening respiratory failure  IV Propofol if needed for sedation, target RASS -2, with daily SAT  Fentanyl gtt as needed  Daily SBT per protocol   Antibiotic D#3, CTX/Vanc, can d/c vanc if MSSA  IV levophed to maintain MAP of 65  Hydrocortisone  Blood sugar control, with goal 140-180  Nutrition: Tube feeds    On heparin infusion    D/W Dr. Nic Ramírez  Prophylaxis: Pepcid and Bactroban  DNR CCA    Total critical care time caring for this patient with life threatening, unstable organ failure, including direct patient contact, management of life support systems, review of data including imaging and labs, discussions with other team members and physicians at least 31 minutes so far today, excluding procedures.

## 2022-08-19 NOTE — PROGRESS NOTES
08/19/22 0301   Patient Observation   Heart Rate 65   Resp (!) 9   SpO2 97 %   Breath Sounds   Right Upper Lobe Diminished   Right Middle Lobe Diminished   Right Lower Lobe Diminished   Left Upper Lobe Diminished   Left Lower Lobe Diminished   Ventilator Settings   Vt (Set, mL) 355 mL   Resp Rate (Set) 20 bmp   PEEP/CPAP (cmH2O) 5   FiO2  50 %   Vent Patient Data (Readings)   Vt (Measured) 387 mL   Peak Inspiratory Pressure (cmH2O) 14 cmH2O   Rate Measured 20 br/min   Minute Volume (L/min) 7.47 Liters   Mean Airway Pressure (cmH2O) 7.9 cmH20   Plateau Pressure (cm H2O) 13 cm H2O   I:E Ratio 1:2.80   Vent Alarm Settings   Low Minute Volume (lpm) 4 L/min   Low Exhaled Vt (ml) 255 mL   High Exhaled Vt (ml) 750 mL   RR High (bpm) 45 br/min   Apnea (secs) 20 secs   Additional Respiratoray Assessments   Humidification Source Heated wire   Humidification Temp 37   Circuit Condensation Drained   Ambu Bag With Mask At Bedside Yes   Airway Clearance   Suction ET Tube   Suction Device Inline suction catheter   Sputum Method Obtained Endotracheal   Sputum Amount Scant   Sputum Color/Odor Other (comment)  (CREAMY)   Sputum Consistency Thick   ETT (adult)   Placement Date: 08/17/22   Present on Admission/Arrival: Yes  Placed By: Other (comment)  Airway Tube Size: 7.5 mm  Location: Oral  Secured At: 22 cm  Measured From: Lips   Secured At 22 cm   Measured From Lips   ETT Placement (S)  Left   Secured By Commercial tube florian   Site Assessment Dry

## 2022-08-19 NOTE — PROGRESS NOTES
Pharmacy Note  Vancomycin Consult    Neeru Camejo is a 80 y.o. female started on Vancomycin for Aspiration PNA; consult received from Dr. Bruna Agarwal to manage therapy. Also receiving the following antibiotics: Rocephin. Allergies:  Avandia [rosiglitazone maleate], Codeine, Glucophage [metformin hydrochloride], Hydrochlorothiazide, and Tramadol     Tmax: 97.6    Recent Labs     08/18/22  0339 08/19/22  0445   CREATININE 1.9* 1.0       Recent Labs     08/18/22  0339 08/19/22  0445   WBC 20.0* 17.9*       Estimated Creatinine Clearance: 41 mL/min (based on SCr of 1 mg/dL). Intake/Output Summary (Last 24 hours) at 8/19/2022 1051  Last data filed at 8/19/2022 1016  Gross per 24 hour   Intake 3618.82 ml   Output 1005 ml   Net 2613.82 ml       Wt Readings from Last 1 Encounters:   08/19/22 130 lb 15.3 oz (59.4 kg)         Body mass index is 21.14 kg/m². Culture Date      Source                       Results  8/17                   Resp                    Strep/Staph  Loading dose (critically ill or in ICU, require dialysis or renal replacement therapy): Vancomycin 25 mg/kg IVPB x 1 (maximum 2500 mg). Maintenance dose: 15 mg/kg (maximum: 2000 mg/dose and 4500 mg/day) starting at the next dosing interval determined by renal function  Pulse dose: fluctuating renal function, NEELIMA, ESRD   Goal Vancomycin trough: 10-15 mcg/mL or 15-20 mcg/mL   Goal Vancomycin AUC: 400-600     Assessment/Plan:  Pt was on Vancomycin and discontinued yesterday, this am Staph added to culture, if MSSA may discontinue the Vanc. Pt last level  17 8/18. Will no bolus, start 750mg q24h, Level due on 21st at 10 am. AUC pred= [address] 8/19/202210:54 AM  .        Thank you for the consult.

## 2022-08-19 NOTE — PROGRESS NOTES
Upon initial assessment this shift, this RN noticed that the heparin drip channel was off when in report, it was supposed to be running. This RN checked the [facility] and the orders to see if the drip was supposed to be running and the order was for it to be running continuously at 9.5 mL/hr. Bag was restarted and pharmacy was notified to change the labs that were originally due at 2100 to 8/19 at 0200.

## 2022-08-20 ENCOUNTER — APPOINTMENT (OUTPATIENT)
Dept: GENERAL RADIOLOGY | Age: 82
DRG: 871 | End: 2022-08-20
Payer: MEDICARE

## 2022-08-20 PROBLEM — J96.01 ACUTE RESPIRATORY FAILURE WITH HYPOXIA (HCC): Status: ACTIVE | Noted: 2022-08-20

## 2022-08-20 PROBLEM — I48.91 ATRIAL FIBRILLATION WITH RVR (HCC): Status: ACTIVE | Noted: 2022-08-20

## 2022-08-20 PROBLEM — N17.9 AKI (ACUTE KIDNEY INJURY) (HCC): Status: ACTIVE | Noted: 2022-08-20

## 2022-08-20 LAB
A/G RATIO: 0.7 (ref 1.1–2.2)
ALBUMIN SERPL-MCNC: 2.3 G/DL (ref 3.4–5)
ALP BLD-CCNC: 129 U/L (ref 40–129)
ALT SERPL-CCNC: 18 U/L (ref 10–40)
ANION GAP SERPL CALCULATED.3IONS-SCNC: 12 MMOL/L (ref 3–16)
ANISOCYTOSIS: ABNORMAL
ANTI-XA UNFRAC HEPARIN: 0.45 IU/ML (ref 0.3–0.7)
AST SERPL-CCNC: 29 U/L (ref 15–37)
BASE EXCESS ARTERIAL: 4 MMOL/L (ref -3–3)
BASOPHILS ABSOLUTE: 0 K/UL (ref 0–0.2)
BASOPHILS RELATIVE PERCENT: 0 %
BILIRUB SERPL-MCNC: 0.4 MG/DL (ref 0–1)
BUN BLDV-MCNC: 56 MG/DL (ref 7–20)
CALCIUM SERPL-MCNC: 9 MG/DL (ref 8.3–10.6)
CARBOXYHEMOGLOBIN ARTERIAL: 0.3 % (ref 0–1.5)
CHLORIDE BLD-SCNC: 106 MMOL/L (ref 99–110)
CO2: 26 MMOL/L (ref 21–32)
CREAT SERPL-MCNC: 0.7 MG/DL (ref 0.6–1.2)
EOSINOPHILS ABSOLUTE: 0 K/UL (ref 0–0.6)
EOSINOPHILS RELATIVE PERCENT: 0 %
GFR AFRICAN AMERICAN: >60
GFR NON-AFRICAN AMERICAN: >60
GLUCOSE BLD-MCNC: 152 MG/DL (ref 70–99)
GLUCOSE BLD-MCNC: 161 MG/DL (ref 70–99)
GLUCOSE BLD-MCNC: 167 MG/DL (ref 70–99)
GLUCOSE BLD-MCNC: 184 MG/DL (ref 70–99)
GLUCOSE BLD-MCNC: 186 MG/DL (ref 70–99)
GLUCOSE BLD-MCNC: 244 MG/DL (ref 70–99)
GLUCOSE BLD-MCNC: 251 MG/DL (ref 70–99)
HCO3 ARTERIAL: 28.2 MMOL/L (ref 21–29)
HCT VFR BLD CALC: 32.9 % (ref 36–48)
HEMOGLOBIN, ART, EXTENDED: 12.3 G/DL (ref 12–16)
HEMOGLOBIN: 10.7 G/DL (ref 12–16)
LYMPHOCYTES ABSOLUTE: 0.5 K/UL (ref 1–5.1)
LYMPHOCYTES RELATIVE PERCENT: 4 %
MCH RBC QN AUTO: 31.2 PG (ref 26–34)
MCHC RBC AUTO-ENTMCNC: 32.6 G/DL (ref 31–36)
MCV RBC AUTO: 95.4 FL (ref 80–100)
METHEMOGLOBIN ARTERIAL: 0.3 %
MONOCYTES ABSOLUTE: 0.2 K/UL (ref 0–1.3)
MONOCYTES RELATIVE PERCENT: 2 %
NEUTROPHILS ABSOLUTE: 11.4 K/UL (ref 1.7–7.7)
NEUTROPHILS RELATIVE PERCENT: 94 %
O2 SAT, ARTERIAL: 96.4 %
O2 THERAPY: ABNORMAL
PCO2 ARTERIAL: 40.6 MMHG (ref 35–45)
PDW BLD-RTO: 18 % (ref 12.4–15.4)
PERFORMED ON: ABNORMAL
PH ARTERIAL: 7.46 (ref 7.35–7.45)
PLATELET # BLD: 131 K/UL (ref 135–450)
PLATELET SLIDE REVIEW: ADEQUATE
PMV BLD AUTO: 8 FL (ref 5–10.5)
PO2 ARTERIAL: 80.2 MMHG (ref 75–108)
POTASSIUM REFLEX MAGNESIUM: 4.6 MMOL/L (ref 3.5–5.1)
RBC # BLD: 3.44 M/UL (ref 4–5.2)
SLIDE REVIEW: ABNORMAL
SODIUM BLD-SCNC: 144 MMOL/L (ref 136–145)
TCO2 ARTERIAL: 29.4 MMOL/L
TOTAL PROTEIN: 5.5 G/DL (ref 6.4–8.2)
WBC # BLD: 12.1 K/UL (ref 4–11)

## 2022-08-20 PROCEDURE — 99233 SBSQ HOSP IP/OBS HIGH 50: CPT | Performed by: INTERNAL MEDICINE

## 2022-08-20 PROCEDURE — 2000000000 HC ICU R&B

## 2022-08-20 PROCEDURE — 80053 COMPREHEN METABOLIC PANEL: CPT

## 2022-08-20 PROCEDURE — 2700000000 HC OXYGEN THERAPY PER DAY

## 2022-08-20 PROCEDURE — 36592 COLLECT BLOOD FROM PICC: CPT

## 2022-08-20 PROCEDURE — 2580000003 HC RX 258: Performed by: NURSE PRACTITIONER

## 2022-08-20 PROCEDURE — 94640 AIRWAY INHALATION TREATMENT: CPT

## 2022-08-20 PROCEDURE — 2500000003 HC RX 250 WO HCPCS: Performed by: NURSE PRACTITIONER

## 2022-08-20 PROCEDURE — 94761 N-INVAS EAR/PLS OXIMETRY MLT: CPT

## 2022-08-20 PROCEDURE — 6370000000 HC RX 637 (ALT 250 FOR IP): Performed by: INTERNAL MEDICINE

## 2022-08-20 PROCEDURE — A4216 STERILE WATER/SALINE, 10 ML: HCPCS | Performed by: INTERNAL MEDICINE

## 2022-08-20 PROCEDURE — 2580000003 HC RX 258: Performed by: INTERNAL MEDICINE

## 2022-08-20 PROCEDURE — 99291 CRITICAL CARE FIRST HOUR: CPT | Performed by: INTERNAL MEDICINE

## 2022-08-20 PROCEDURE — 85520 HEPARIN ASSAY: CPT

## 2022-08-20 PROCEDURE — 82803 BLOOD GASES ANY COMBINATION: CPT

## 2022-08-20 PROCEDURE — 94003 VENT MGMT INPAT SUBQ DAY: CPT

## 2022-08-20 PROCEDURE — 6360000002 HC RX W HCPCS: Performed by: STUDENT IN AN ORGANIZED HEALTH CARE EDUCATION/TRAINING PROGRAM

## 2022-08-20 PROCEDURE — 6360000002 HC RX W HCPCS: Performed by: INTERNAL MEDICINE

## 2022-08-20 PROCEDURE — 31720 CLEARANCE OF AIRWAYS: CPT

## 2022-08-20 PROCEDURE — 71045 X-RAY EXAM CHEST 1 VIEW: CPT

## 2022-08-20 PROCEDURE — 6370000000 HC RX 637 (ALT 250 FOR IP): Performed by: NURSE PRACTITIONER

## 2022-08-20 PROCEDURE — 85025 COMPLETE CBC W/AUTO DIFF WBC: CPT

## 2022-08-20 PROCEDURE — 2500000003 HC RX 250 WO HCPCS: Performed by: INTERNAL MEDICINE

## 2022-08-20 RX ORDER — DEXTROSE MONOHYDRATE 50 MG/ML
INJECTION, SOLUTION INTRAVENOUS CONTINUOUS
Status: ACTIVE | OUTPATIENT
Start: 2022-08-20 | End: 2022-08-20

## 2022-08-20 RX ORDER — IPRATROPIUM BROMIDE AND ALBUTEROL SULFATE 2.5; .5 MG/3ML; MG/3ML
1 SOLUTION RESPIRATORY (INHALATION) EVERY 4 HOURS PRN
Status: DISCONTINUED | OUTPATIENT
Start: 2022-08-20 | End: 2022-08-30 | Stop reason: HOSPADM

## 2022-08-20 RX ADMIN — Medication 10 ML: at 07:49

## 2022-08-20 RX ADMIN — Medication 15 ML: at 07:47

## 2022-08-20 RX ADMIN — Medication 10 ML: at 20:32

## 2022-08-20 RX ADMIN — NOREPINEPHRINE BITARTRATE 2 MCG/MIN: 1 INJECTION INTRAVENOUS at 13:36

## 2022-08-20 RX ADMIN — HYDROCORTISONE SODIUM SUCCINATE 50 MG: 100 INJECTION, POWDER, FOR SOLUTION INTRAMUSCULAR; INTRAVENOUS at 20:39

## 2022-08-20 RX ADMIN — DEXTROSE MONOHYDRATE: 50 INJECTION, SOLUTION INTRAVENOUS at 10:59

## 2022-08-20 RX ADMIN — INSULIN LISPRO 1 UNITS: 100 INJECTION, SOLUTION INTRAVENOUS; SUBCUTANEOUS at 16:28

## 2022-08-20 RX ADMIN — NOREPINEPHRINE BITARTRATE 2 MCG/MIN: 1 INJECTION INTRAVENOUS at 18:02

## 2022-08-20 RX ADMIN — WHITE PETROLATUM 57.7 %-MINERAL OIL 31.9 % EYE OINTMENT: at 07:46

## 2022-08-20 RX ADMIN — MUPIROCIN: 20 OINTMENT TOPICAL at 07:48

## 2022-08-20 RX ADMIN — CEFTRIAXONE 2000 MG: 2 INJECTION, POWDER, FOR SOLUTION INTRAMUSCULAR; INTRAVENOUS at 10:54

## 2022-08-20 RX ADMIN — HYDROCORTISONE SODIUM SUCCINATE 50 MG: 100 INJECTION, POWDER, FOR SOLUTION INTRAMUSCULAR; INTRAVENOUS at 07:59

## 2022-08-20 RX ADMIN — NOREPINEPHRINE BITARTRATE 3 MCG/MIN: 1 INJECTION INTRAVENOUS at 07:45

## 2022-08-20 RX ADMIN — NOREPINEPHRINE BITARTRATE 1 MCG/MIN: 1 INJECTION INTRAVENOUS at 14:11

## 2022-08-20 RX ADMIN — FAMOTIDINE 20 MG: 10 INJECTION INTRAVENOUS at 07:59

## 2022-08-20 RX ADMIN — Medication 50 MCG/HR: at 06:50

## 2022-08-20 RX ADMIN — VANCOMYCIN HYDROCHLORIDE 750 MG: 750 INJECTION, POWDER, LYOPHILIZED, FOR SOLUTION INTRAVENOUS at 12:26

## 2022-08-20 RX ADMIN — MUPIROCIN: 20 OINTMENT TOPICAL at 20:38

## 2022-08-20 RX ADMIN — CARBOXYMETHYLCELLULOSE SODIUM 1 DROP: 10 GEL OPHTHALMIC at 07:46

## 2022-08-20 RX ADMIN — HEPARIN SODIUM 950 UNITS/HR: 10000 INJECTION, SOLUTION INTRAVENOUS at 06:51

## 2022-08-20 RX ADMIN — Medication: at 20:38

## 2022-08-20 RX ADMIN — Medication: at 07:50

## 2022-08-20 RX ADMIN — IPRATROPIUM BROMIDE AND ALBUTEROL SULFATE 1 AMPULE: 2.5; .5 SOLUTION RESPIRATORY (INHALATION) at 07:47

## 2022-08-20 ASSESSMENT — PULMONARY FUNCTION TESTS
PIF_VALUE: 24
PIF_VALUE: 31
PIF_VALUE: 29
PIF_VALUE: 28
PIF_VALUE: 32
PIF_VALUE: 29
PIF_VALUE: 11
PIF_VALUE: 11
PIF_VALUE: 16
PIF_VALUE: 24
PIF_VALUE: 27

## 2022-08-20 NOTE — PLAN OF CARE
Problem: Discharge Planning  Goal: Discharge to home or other facility with appropriate resources  Outcome: Progressing     Problem: Safety - Medical Restraint  Goal: Remains free of injury from restraints (Restraint for Interference with Medical Device)  Description: INTERVENTIONS:  1. Determine that other, less restrictive measures have been tried or would not be effective before applying the restraint  2. Evaluate the patient's condition at the time of restraint application  3. Inform patient/family regarding the reason for restraint  4.  Q2H: Monitor safety, psychosocial status, comfort, nutrition and hydration  Outcome: Progressing  Flowsheets  Taken 8/19/2022 2000 by Rodolfo Braga RN  Remains free of injury from restraints (restraint for interference with medical device): Determine that other, less restrictive measures have been tried or would not be effective before applying the restraint  Taken 8/19/2022 1800 by Leatha Mckee RN  Remains free of injury from restraints (restraint for interference with medical device): Every 2 hours: Monitor safety, psychosocial status, comfort, nutrition and hydration  Taken 8/19/2022 1600 by Leatha Mckee RN  Remains free of injury from restraints (restraint for interference with medical device): Every 2 hours: Monitor safety, psychosocial status, comfort, nutrition and hydration  Taken 8/19/2022 1400 by Leatha Mckee RN  Remains free of injury from restraints (restraint for interference with medical device): Every 2 hours: Monitor safety, psychosocial status, comfort, nutrition and hydration  Taken 8/19/2022 1200 by Leatha Mckee RN  Remains free of injury from restraints (restraint for interference with medical device): Every 2 hours: Monitor safety, psychosocial status, comfort, nutrition and hydration  Taken 8/19/2022 1000 by Leatha Mckee RN  Remains free of injury from restraints (restraint for interference with medical device): Every 2 hours: Monitor safety, psychosocial status, comfort, nutrition and hydration  Taken 8/19/2022 0800 by Damaris Conti RN  Remains free of injury from restraints (restraint for interference with medical device): Every 2 hours: Monitor safety, psychosocial status, comfort, nutrition and hydration  Note: Bilateral soft wrist restraints remain in place for patient safety, and to protect all lines and airways. Problem: Pain  Goal: Verbalizes/displays adequate comfort level or baseline comfort level  Outcome: Progressing     Problem: Chronic Conditions and Co-morbidities  Goal: Patient's chronic conditions and co-morbidity symptoms are monitored and maintained or improved  Outcome: Progressing     Problem: Nutrition Deficit:  Goal: Optimize nutritional status  Outcome: Progressing  Note: Patient receiving nutrition via continuous tube feed. Problem: Safety - Adult  Goal: Free from fall injury  Outcome: Progressing  Note: Fall precautions in place. Problem: Skin/Tissue Integrity  Goal: Absence of new skin breakdown  Description: 1. Monitor for areas of redness and/or skin breakdown  2. Assess vascular access sites hourly  3. Every 4-6 hours minimum:  Change oxygen saturation probe site  4. Every 4-6 hours:  If on nasal continuous positive airway pressure, respiratory therapy assess nares and determine need for appliance change or resting period. Outcome: Progressing  Note: Patient turned/repositioned every 2 hours and as needed to maintain and improve skin integrity.

## 2022-08-20 NOTE — PROGRESS NOTES
Patient updates given to Lita León RN. Patient has rested comfortably overnight on the ventilator without acute changes in assessment noted. Levophed has been weaned to 2mcg/min. Care transferred.

## 2022-08-20 NOTE — PROGRESS NOTES
Vancomycin Day: 2  Current Regimen: 750 mg IV every 24 hours    Patient's labs, cultures, vitals, and vancomycin regimen reviewed. No changes today.

## 2022-08-20 NOTE — PROGRESS NOTES
08/20/22 0404   Patient Observation   Heart Rate 67   Resp 19   SpO2 95 %   Observations 7.5 ett 22 at lip   Breath Sounds   Right Upper Lobe Diminished   Right Middle Lobe Diminished   Right Lower Lobe Diminished   Left Upper Lobe Diminished   Left Lower Lobe Diminished   Vent Information   Vent Mode AC/VC   Ventilator Settings   Vt (Set, mL) 355 mL   Resp Rate (Set) 16 bmp   PEEP/CPAP (cmH2O) 5   FiO2  50 %   Vent Patient Data (Readings)   Vt (Measured) 408 mL   Peak Inspiratory Pressure (cmH2O) 27 cmH2O   Rate Measured 16 br/min   Minute Volume (L/min) 6.16 Liters   Mean Airway Pressure (cmH2O) 9.1 cmH20   Plateau Pressure (cm H2O) 15 cm H2O   I:E Ratio 1:3.80   Flow Sensitivity 3 L/min   Vent Alarm Settings   Low Minute Volume (lpm) 4 L/min   Low Exhaled Vt (ml) 255 mL   RR High (bpm) 45 br/min   Apnea (secs) 20 secs   Additional Respiratoray Assessments   Humidification Source Heated wire   Humidification Temp 37   Circuit Condensation Drained   Ambu Bag With Mask At Bedside Yes   Airway Clearance   Sputum Method Obtained Endotracheal   Sputum Amount Small   Sputum Color/Odor Other (comment)  (creamy)   Sputum Consistency Thick   ETT (adult)   Placement Date: 08/17/22   Present on Admission/Arrival: Yes  Placed By: Other (comment)  Airway Tube Size: 7.5 mm  Location: Oral  Secured At: 22 cm  Measured From: Lips   Secured At 22 cm   Measured From Lips   ETT Placement Center   Secured By Commercial tube florian

## 2022-08-20 NOTE — PROGRESS NOTES
Pulmonary & Critical Care Medicine ICU Progress Note    CC: Acute respiratory failure    Events of Last 24 hours:   Levo is at 3  On SBT    Invasive Lines: 2022 right femoral CVC in ER    MV: 2022  Vent Mode: AC/VC Resp Rate (Set): 16 bmp/Vt (Set, mL): 355 mL/ /FiO2 : 50 %  Recent Labs     22  0440 22  0555   PHART 7.478* 7.459*   LBO3GIE 33.7* 40.6   PO2ART 79.2 80.2       IV:   norepinephrine 2 mcg/min (22 0600)    vasopressin (Septic Shock) infusion Stopped (22 1037)    propofol Stopped (22 1337)    heparin (PORCINE) Infusion 950 Units/hr (22 0651)    sodium chloride      propofol      fentaNYL 50 mcg/hr (22 0650)       Vitals:  Blood pressure (!) 92/51, pulse 65, temperature 98.2 °F (36.8 °C), temperature source Bladder, resp. rate 19, height 5' 6\" (1.676 m), weight 134 lb 0.6 oz (60.8 kg), SpO2 97 %. on ventilator  Temp  Av °F (36.7 °C)  Min: 97.9 °F (36.6 °C)  Max: 98.2 °F (36.8 °C)    Intake/Output Summary (Last 24 hours) at 2022 0710  Last data filed at 2022 0600  Gross per 24 hour   Intake 2295.68 ml   Output 845 ml   Net 1450.68 ml     EXAM:  General: intubated, ill appearing    ENT: Pharynx with ETT. Resp: No crackles. No wheezing. CV: S1, S2. No edema  GI: NT, ND, +BS  Skin: Warm and dry. Neuro: PERRL.  Awake but not following commands     Scheduled Meds:   hydrocortisone sodium succinate PF  50 mg IntraVENous Q12H    vancomycin  750 mg IntraVENous Q24H    cefTRIAXone (ROCEPHIN) IV  2,000 mg IntraVENous Q24H    Venelex   Topical BID    sodium chloride flush  5-40 mL IntraVENous 2 times per day    insulin lispro  0-4 Units SubCUTAneous Q4H    carboxymethylcellulose PF  1 drop Both Eyes Q4H    And    artificial tears   Both Eyes Q4H    chlorhexidine  15 mL Mouth/Throat BID    famotidine (PEPCID) injection  20 mg IntraVENous Daily    mupirocin   Nasal BID    ipratropium-albuterol  1 ampule Inhalation Q4H While awake     PRN Meds:  heparin (porcine), heparin (porcine), sodium chloride flush, sodium chloride, ondansetron **OR** ondansetron, polyethylene glycol, acetaminophen **OR** acetaminophen, propofol, fentaNYL **AND** fentaNYL, ipratropium-albuterol    Results:  CBC:   Recent Labs     22  0339 22  0445 22  0555   WBC 20.0* 17.9* 12.1*   HGB 13.7 10.8* 10.7*   HCT 43.6 31.9* 32.9*   .7* 95.0 95.4    137 131*     BMP:   Recent Labs     22  0339 22  0445 22  0555    139 144   K 4.0 3.1* 4.6    102 106   CO2 20* 26 26   BUN 75* 63* 56*   CREATININE 1.9* 1.0 0.7     LIVER PROFILE:   Recent Labs     22  1230 22  0339 22  0445 22  0555   AST 23 18 15 29   ALT 13 13 11 18   LIPASE 17.0  --   --   --    BILITOT 1.2* 0.8 1.7* 0.4   ALKPHOS 94 95 95 129     Cultures:  2022 SARS-CoV-2 and influenza are negative  2022 blood sent  2022 streptococcal pneumonia antigen is positive  2022 tracheal aspirate staff aureus and streptococcal pneumonia     Imagin2022 C-spine no acute abnormality  2022 chest CT left greater than right lower lobe predominant airspace disease  2022 abdominal CT gallbladder distention, cystic pancreatic lesions, atherosclerotic disease of aorta    2022 CXR left-sided airspace disease    ECHO 19 with EF 60-65%, Grade I DD     ASSESSMENT:  Acute hypoxemic respiratory failure   Septic shock  Acute kidney failure has resolved  A. fib RVR, has converted to sinus bradycardia  Streptococcal pneumoniae pneumonia, respiratory culture also with Staph   Dementia of the Alzheimer's type  Chronic nursing home resident  Diabetes  Low voltage EKG    PLAN:  Mechanical ventilation as per my orders.  The ventilator was adjusted by me at the bedside for unstable, life threatening respiratory failure  IV Propofol if needed for sedation, target RASS -2, with daily SAT  Fentanyl gtt as needed  Daily SBT per protocol Antibiotic D#4, CTX/Vanc, can d/c vanc if MSSA  IV levophed to maintain MAP of 65  Hydrocortisone  Blood sugar control, with goal 140-180  Nutrition: Tube feeds    On heparin infusion    Prophylaxis: Pepcid and Bactroban  DNR CCA    Total critical care time caring for this patient with life threatening, unstable organ failure, including direct patient contact, management of life support systems, review of data including imaging and labs, discussions with other team members and physicians at least 30 minutes so far today, excluding procedures.

## 2022-08-20 NOTE — PROGRESS NOTES
RT Nebulizer Bronchodilator Protocol Note    There is a bronchodilator order in the chart from a provider indicating to follow the RT Bronchodilator Protocol and there is an Initiate RT Bronchodilator Protocol order as well (see protocol at bottom of note). CXR Findings:  XR CHEST PORTABLE    Result Date: 8/20/2022  Patchy infiltrates seen within the lungs bilaterally which may represent multifocal pneumonia. XR CHEST PORTABLE    Result Date: 8/19/2022  Slightly improving aeration of the lungs in this intubated patient. The findings from the last RT Protocol Assessment were as follows:  Smoking: (P) Smoker 15 pack years or more  Respiratory Pattern: (P) Regular pattern and RR 12-20 bpm  Breath Sounds: (P) Clear breath sounds  Cough: (P) Strong, productive  Indication for Bronchodilator Therapy: (P) Decreased or absent breath sounds  Bronchodilator Assessment Score: (P) 2    Aerosolized bronchodilator medication orders have been revised according to the RT Nebulizer Bronchodilator Protocol below. Respiratory Therapist to perform RT Therapy Protocol Assessment initially then follow the protocol. Repeat RT Therapy Protocol Assessment PRN for score 0-3 or on second treatment, BID, and PRN for scores above 3. No Indications - adjust the frequency to every 6 hours PRN wheezing or bronchospasm, if no treatments needed after 48 hours then discontinue using Per Protocol order mode. If indication present, adjust the RT bronchodilator orders based on the Bronchodilator Assessment Score as indicated below. If a patient is on this medication at home then do not decrease Frequency below that used at home. 0-3 - enter or revise RT bronchodilator order(s) to equivalent RT Bronchodilator order with Frequency of every 4 hours PRN for wheezing or increased work of breathing using Per Protocol order mode.        4-6 - enter or revise RT Bronchodilator order(s) to two equivalent RT bronchodilator orders with

## 2022-08-20 NOTE — PROGRESS NOTES
08/19/22 2311   Patient Observation   Heart Rate 71   Resp 19   SpO2 96 %   Observations 7.5 ETT 22 AT LIP   Breath Sounds   Right Upper Lobe Diminished   Right Middle Lobe Diminished   Right Lower Lobe Diminished   Left Upper Lobe Diminished   Left Lower Lobe Diminished   Vent Information   Vent Mode AC/VC   Ventilator Settings   Vt (Set, mL) 355 mL   Resp Rate (Set) 16 bmp   PEEP/CPAP (cmH2O) 5   FiO2  50 %   Vent Patient Data (Readings)   Vt (Measured) 370 mL   Peak Inspiratory Pressure (cmH2O) 17 cmH2O   Rate Measured 16 br/min   Minute Volume (L/min) 5.89 Liters   Mean Airway Pressure (cmH2O) 7.5 cmH20   Plateau Pressure (cm H2O) 15 cm H2O   I:E Ratio 1:3.80   Flow Sensitivity 3 L/min   Vent Alarm Settings   Low Minute Volume (lpm) 4 L/min   Low Exhaled Vt (ml) 255 mL   RR High (bpm) 45 br/min   Apnea (secs) 20 secs   Additional Respiratoray Assessments   Humidification Source Heated wire   Humidification Temp 37   Circuit Condensation Drained   Ambu Bag With Mask At Bedside Yes   Airway Clearance   Sputum Method Obtained Endotracheal   Sputum Amount Small   Sputum Color/Odor Other (comment)  (creamy)   Sputum Consistency Thick   ETT (adult)   Placement Date: 08/17/22   Present on Admission/Arrival: Yes  Placed By: Other (comment)  Airway Tube Size: 7.5 mm  Location: Oral  Secured At: 22 cm  Measured From: Lips   Secured At 22 cm   Measured From Lips   ETT Placement Right   Secured By Commercial tube florian

## 2022-08-20 NOTE — PROGRESS NOTES
4 Eyes Skin Assessment     The patient is being assess for   Shift Handoff    I agree that 2 RN's have performed a thorough Head to Toe Skin Assessment on the patient. ALL assessment sites listed below have been assessed. Areas assessed for pressure by both nurses:   [x]   Head, Face, and Ears   [x]   Shoulders, Back, and Chest, Abdomen  [x]   Arms, Elbows, and Hands   [x]   Coccyx, Sacrum, and Ischium  [x]   Legs, Feet, and Heels        Skin Assessed Under all Medical Devices by both nurses:  ETT, may, OG, and heel boots              All Mepilex Borders were peeled back and area peeked at by both nurses:  No: NA  Please list where Mepilex Borders are located:  NA             **SHARE this note so that the co-signing nurse is able to place an eSignature**    Co-signer eSignature: Electronically signed by Jose Francisco Brice RN on 8/20/22 at 12:46 AM EDT    Does the Patient have Skin Breakdown related to pressure?   Yes [facility] WOUND CARE was Initiated documentation include the Nereida-wound, Wound Assessment, Measurements, Dressing Treatment, Drainage, and Color\",     (Insert Photo here  )         Bobby Prevention initiated:  Yes   Wound Care Orders initiated:  Yes      [id number]  nurse consulted for Pressure Injury (Stage 3,4, Unstageable, DTI, NWPT, Complex wounds)and New or Established Ostomies:  Yes      Primary Nurse eSignature: Electronically signed by Niki Amaya RN on 8/19/22 at 8:13 PM EDT

## 2022-08-20 NOTE — PROGRESS NOTES
Dr Anshu Murphy updated and she is in to benji pt and Dr Ward Rm in unit and updated on respiratory status and that family would want re-intubation if her respiratory status declines.  Roldan Rogel RN

## 2022-08-20 NOTE — PROGRESS NOTES
Care rounds completed with Dr Marcos Arias and multidisciplinary team. Pt on SBT respirations are easy and O2 decreased to 40%. Reviewed labs, meds, VS (temp/HR/RR), I/O's, assessment, & plan of care for today. See progress note & new orders for details.  Danial Cranker RN

## 2022-08-20 NOTE — PROGRESS NOTES
120 cc of IV Fentanyl wasted and witnessed by Ashwin Lab BALTA Licea RN   Electronically signed by Hamida Tucker RN on 8/20/2022 at 9:34 AM

## 2022-08-20 NOTE — PROGRESS NOTES
Pt extubated to 5lpm NC       08/20/22 0919   Oxygen Therapy/Pulse Ox   O2 Therapy Oxygen humidified   O2 Device Nasal cannula   O2 Flow Rate (L/min) 5 L/min

## 2022-08-20 NOTE — PROGRESS NOTES
Family at bedside and update given.  Pt off vent and on 5 lpm N/C SpO2 in 92-93 range Koby Catalan RN

## 2022-08-20 NOTE — PROGRESS NOTES
Pharmacy - RE:  Low-dose Heparin drip  Current rate = 9.5 ml/h  (950 units/h)  Anti-Xa drawn @ 0555 = 0.45  Goal anti-Xa = 0.3 - 0.7   Per protocol, continue with 950 units/hr (9.5ml/hr). Next anti-Xa due 8/21 with AM lab draws.

## 2022-08-20 NOTE — PROGRESS NOTES
Had conversation with daughter and son regarding code status and if her respiratory status would decline would they want her re-intubated and placed back on ventilator.  They both agree that they would want her placed back on vent if needed and want to continue code status of no CPR or defib if heart would stop Blima Span

## 2022-08-20 NOTE — CARE COORDINATION
CM consult by Rm Kolb RN to discuss family interest in transferring pt from [place] [address] ([address] [facility] to [facility] due to [place] being close to family. Cm advised pt's son to initiate transfer with the  at [place] [address] on Monday (during business hours).  +CM following

## 2022-08-20 NOTE — PROGRESS NOTES
Patient care assumed, assessment completed as charted. Patient continues on ventilator, #7.5 at the 22 lip line. A/C 16, , FiO2 50%, PEEP 5. Right femoral CVC in place with Heparin infusing at 950u/hr, Fentanyl at 50mcg/hr, and Levophed at 4mcg/min. OG in place with tube feed running at 40mL/hr, may patent, bilateral soft wrist restraints continue for patient safety. No further needs assessed at this time. Will monitor.

## 2022-08-20 NOTE — PROGRESS NOTES
IM Progress Note    Admit Date:  8/17/2022  3    Interval history:    pneumonia, on vent   A. fib RVR, placed on heparin drip  Hypotensive on Levophed. Extubated today. 8/20    Subjective:  Ms. Sybil Cruz seen . She was extubated this morning. Was requiring O2 up to 10 L, currently on 6 L . Has significant respiratory secretions requiring suctioning. She has advanced dementia. She is awake,  responding a little. Confused. Objective:   /87   Pulse 81   Temp 98.1 °F (36.7 °C)   Resp 20   Ht 5' 6\" (1.676 m)   Wt 134 lb 0.6 oz (60.8 kg)   SpO2 (!) 85%   BMI 21.63 kg/m²     Intake/Output Summary (Last 24 hours) at 8/20/2022 1332  Last data filed at 8/20/2022 0849  Gross per 24 hour   Intake 2145.68 ml   Output 645 ml   Net 1500.68 ml         Physical Exam:    General: elderly female,   Awake , responding a little , confused  . Appears to be not in any distress  Mucous Membranes:  Pink , anicteric  Neck: No JVD, no carotid bruit, no thyromegaly  Chest: Diminished breath sounds with scattered crackles  Cardiovascular:  RRR S1S2 heard, no murmurs or gallops  Abdomen:  Soft, undistended, non tender, no organomegaly, BS present  Extremities: right femoral TLC   No edema or cyanosis.  Distal pulses well felt  Neurological : Nonfocal       Medications:   Scheduled Medications:    hydrocortisone sodium succinate PF  50 mg IntraVENous Q12H    vancomycin  750 mg IntraVENous Q24H    cefTRIAXone (ROCEPHIN) IV  2,000 mg IntraVENous Q24H    Venelex   Topical BID    sodium chloride flush  5-40 mL IntraVENous 2 times per day    insulin lispro  0-4 Units SubCUTAneous Q4H    famotidine (PEPCID) injection  20 mg IntraVENous Daily    mupirocin   Nasal BID     I   dextrose 100 mL/hr at 08/20/22 1059    norepinephrine 3 mcg/min (08/20/22 0745)    vasopressin (Septic Shock) infusion Stopped (08/18/22 1037)    heparin (PORCINE) Infusion 950 Units/hr (08/20/22 0651)    sodium chloride       ipratropium-albuterol, heparin (porcine), heparin (porcine), sodium chloride flush, sodium chloride, ondansetron **OR** ondansetron, polyethylene glycol, acetaminophen **OR** acetaminophen, [DISCONTINUED] fentaNYL **AND** fentaNYL    Lab Data:  Recent Labs     08/18/22 0339 08/19/22  0445 08/20/22  0555   WBC 20.0* 17.9* 12.1*   HGB 13.7 10.8* 10.7*   HCT 43.6 31.9* 32.9*   .7* 95.0 95.4    137 131*       Recent Labs     08/18/22  0339 08/19/22  0445 08/20/22  0555    139 144   K 4.0 3.1* 4.6    102 106   CO2 20* 26 26   BUN 75* 63* 56*   CREATININE 1.9* 1.0 0.7       Recent Labs     08/17/22 2118 08/18/22  0339   TROPONINI 0.03* 0.02*         Coagulation:   Lab Results   Component Value Date/Time    INR 1.24 08/17/2022 12:10 PM    APTT 24.1 08/17/2022 02:52 PM     Cardiac markers:   Lab Results   Component Value Date/Time    CKTOTAL 38 03/21/2017 11:23 AM    TROPONINI 0.02 08/18/2022 03:39 AM         Lab Results   Component Value Date    ALT 18 08/20/2022    AST 29 08/20/2022    ALKPHOS 129 08/20/2022    BILITOT 0.4 08/20/2022       Lab Results   Component Value Date    INR 1.24 (H) 08/17/2022    INR 1.06 09/05/2017    INR 1.13 04/19/2017    PROTIME 15.5 (H) 08/17/2022    PROTIME 12.3 09/05/2017    PROTIME 12.8 04/19/2017       Cultures  Covid not detected  Resp cx strep pneumonia staph aureus  Strep pneumoniae - pneumococcal pneumonia    EKG  Afibb with RVR     Radiology:  XR CHEST PORTABLE   Final Result   Patchy infiltrates seen within the lungs bilaterally which may represent   multifocal pneumonia. XR CHEST PORTABLE   Final Result   Slightly improving aeration of the lungs in this intubated patient. XR CHEST PORTABLE   Final Result   No significant interval change in moderate left-sided airspace disease as   compared to prior. Trace bilateral pleural effusions or pleural thickening. CT ABDOMEN PELVIS W IV CONTRAST Additional Contrast? None   Final Result   1. No pulmonary embolism.    2. Multifocal airspace opacities most prominent in the left lower lobe. Pattern likely represents developing pneumonia. ARDS may be considered. 3. Distended gallbladder with no pattern of inflammation or biliary   dilatation. This may be due to fasting status or other systemic processes   described above. 4. Multiple cystic lesions within the pancreas that have developed or are   increased from a prior study in 2016. Recommend follow-up imaging in 2 years   to ensure stability. 5. Advanced atherosclerotic changes of the aorta in the lower abdomen with   stenosis noted without occlusion. CT CHEST PULMONARY EMBOLISM W CONTRAST   Final Result   1. No pulmonary embolism. 2. Multifocal airspace opacities most prominent in the left lower lobe. Pattern likely represents developing pneumonia. ARDS may be considered. 3. Distended gallbladder with no pattern of inflammation or biliary   dilatation. This may be due to fasting status or other systemic processes   described above. 4. Multiple cystic lesions within the pancreas that have developed or are   increased from a prior study in 2016. Recommend follow-up imaging in 2 years   to ensure stability. 5. Advanced atherosclerotic changes of the aorta in the lower abdomen with   stenosis noted without occlusion. CT CERVICAL SPINE WO CONTRAST   Final Result   No acute abnormality of the cervical spine. CT HEAD WO CONTRAST   Final Result   No acute intracranial abnormality. XR CHEST PORTABLE   Final Result   1. Left-sided pneumonia. Recommend imaging follow-up to resolution. 2. Devices in place as above. ASSESMENT & PLAN:      Pneumococcal pneumonia   - imaging with Multifocal pneumonia- cx with strep pneumo   - started on cefepime and vanc- downgraded to rocephin with cx   - blood cx remain neg   -S/p mechanical ventilation  Continue current antibiotics.   Seen by pulmonary critical care    Acute hypoxemic resp failure  - sec to pneumonia  - started on vent support by EMS at field  - vent mx per pulmonary  S/p mechanical ventilation, patient extubated today 8/20 . Was on oxygen 10 L wean O2 as tolerated-> currently on 6 L    Sepsis- severe with shock  - hypotensive despite IVF boluses now on pressors - on  levo and off vaso  - continue IVF  - added stress dose steroids  - Remains on Levophed     Afibb with RVR   - converted to NSR with amio and digoxin  -Cardiology consulted  -TSH wnl  - Heparin gtt started, plan to set up cardiac monitor at dc and NOAC if appropriate    [id number] Score for Atrial Fibrillation Stroke Risk   Risk   Factors  Component Value   C CHF No 0   H HTN No 0   A2 Age >= 76 Yes,  (80 y.o.) 2   D DM Yes 1   S2 Prior Stroke/TIA No 0   V Vascular Disease No 0   A Age 74-69 No,  (80 y.o.) 0   Sc Sex female 1    [id number]  Score  4       On Heparin drip. Continue anticoagulant  Will need to switch to oral anticoagulants.        DM 2  - holding home meds, currently on ssi     Acute renal failure  - sec to sepsis- likely ATN  - improving with IVF, continue IVF     Advanced dementia  -Limited code  But family does want reintubation if needed ; no CPR no defib      Diet: on  TF   GI/DVT PX: heparin gtt   CODE STATUS: limited     Lakshmi James MD, 8/20/2022 1:32 PM

## 2022-08-20 NOTE — PROGRESS NOTES
CARDIOLOGY PROGRESS NOTE      Patient Name: Jeanne Pierson  Date of admission: 8/17/2022 12:16 PM  Admission Dx: Multifocal pneumonia [J18.9]  Reason for Consult: Atrial fibrillation  Requesting Physician: Ruthann Rodriguez MD  Primary Care physician: No primary care provider on file. Subjective:     Jeanne Pierson is a 80 y.o. patient with prior medical history notable for dementia, diabetes, hypertension, hyperlipidemia and prior CVA, no prior history of cardiac disease otherwise, who presented to the hospital with complaints of acute hypoxic respiratory failure from nursing home. Patient was diagnosed with pneumonia, sepsis, atrial fibrillation on presentation. Patient was evaluated by my partner Dr. Saqib Ruiz 8/18. Noted to be hypotensive/in shock on pressor support. Amiodarone was weaned. Digoxin stopped. Rates observed as she was noted bradycardic at times. 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 8/19: Most recent heart rates ranging 40s-50s with blood pressure 88Z to 90 systolic and maps 71-08. Remains on ventilator. Labs notable for hemoglobin 10.8 from 13.7.  K3.1. Improving renal function, creatinine now 1.0 from 1.9. Chest x-ray today shows slightly improved aeration. No cardiomegaly. 8/20: BP remains similar on low dose norepi. HR chantel high 40's, avg 60's. Remains on vent. Improving acute kidney injury. Passed SBT this am and is due to be imminently extubated. Home Medications:  Were reviewed and are listed in nursing record and/or below  Prior to Admission medications    Medication Sig Start Date End Date Taking?  Authorizing Provider   SITagliptin (JANUVIA) 100 MG tablet Take 1 tablet by mouth daily 1/7/20   Slim Guardado MD   montelukast (SINGULAIR) 10 MG tablet TAKE ONE TABLET BY MOUTH DAILY 12/23/19   BRANYD Gamez CNP   traZODone (DESYREL) 100 MG tablet Take 1 tablet by mouth nightly 10/16/19 11/15/19  BRANDY Gamez CNP   Polyethylene Glycol 3350 (MIRALAX PO) Take by mouth    Historical Provider, MD   Aspirin-Acetaminophen-Caffeine (EXCEDRIN MIGRAINE PO) Take 2 tablets by mouth daily    Historical Provider, MD        CURRENT Medications:  dextrose 5 % solution, Continuous  hydrocortisone sodium succinate PF (SOLU-CORTEF) injection 50 mg, Q12H  vancomycin (VANCOCIN) 750 mg in dextrose 5 % 250 mL IVPB, Q24H  cefTRIAXone (ROCEPHIN) 2,000 mg in dextrose 5 % 50 mL IVPB mini-bag, Q24H  Venelex ointment, BID  norepinephrine (LEVOPHED) 16 mg in dextrose 5 % 250 mL infusion, Continuous  vasopressin 20 Units in dextrose 5 % 100 mL infusion, Continuous  propofol injection, Continuous  heparin (porcine) injection 3,600 Units, PRN  heparin (porcine) injection 1,800 Units, PRN  heparin 25,000 units in dextrose 5% 250 mL (premix) infusion, Continuous  sodium chloride flush 0.9 % injection 5-40 mL, 2 times per day  sodium chloride flush 0.9 % injection 5-40 mL, PRN  0.9 % sodium chloride infusion, PRN  ondansetron (ZOFRAN-ODT) disintegrating tablet 4 mg, Q8H PRN   Or  ondansetron (ZOFRAN) injection 4 mg, Q6H PRN  polyethylene glycol (GLYCOLAX) packet 17 g, Daily PRN  acetaminophen (TYLENOL) tablet 650 mg, Q6H PRN   Or  acetaminophen (TYLENOL) suppository 650 mg, Q6H PRN  insulin lispro (HUMALOG) injection vial 0-4 Units, Q4H  propofol injection, Continuous PRN  carboxymethylcellulose PF (THERATEARS) 1 % ophthalmic gel 1 drop, Q4H   And  lubrifresh P.M. (artificial tears) ophthalmic ointment, Q4H  chlorhexidine (PERIDEX) 0.12 % solution 15 mL, BID  famotidine (PEPCID) 20 mg in sodium chloride (PF) 10 mL injection, Daily  fentaNYL (SUBLIMAZE) 1,000 mcg in sodium chloride 0.9% 100 mL infusion, Continuous   And  fentaNYL bolus from bag, Q30 Min PRN  mupirocin (BACTROBAN) 2 % ointment, BID  ipratropium-albuterol (DUONEB) nebulizer solution 1 ampule, Q4H PRN  ipratropium-albuterol (DUONEB) nebulizer solution 1 ampule, Q4H While awake      Allergies:  Avandia [rosiglitazone maleate], Codeine, Glucophage [metformin hydrochloride], Hydrochlorothiazide, and Tramadol     Review of Systems:   A 14 point review of symptoms completed. Pertinent positives identified in the HPI, all other review of symptoms negative. Objective:     Vitals:    08/20/22 0404 08/20/22 0500 08/20/22 0700 08/20/22 0800   BP:  (!) 100/50 (!) 92/51 (!) 98/59   Pulse: 67 59 65 58   Resp: 19 16 19 16   Temp:    98 °F (36.7 °C)   TempSrc:    Bladder   SpO2: 95% 94% 97% 97%   Weight:       Height:          Weight: 134 lb 0.6 oz (60.8 kg)       PHYSICAL EXAM:    General:  Elderly woman on ventilator, more awake at this time with sedation weaned and extubation planned. Head:  Normocephalic, atraumatic   Eyes:  Conjunctiva/corneas clear, anicteric sclerae    Nose: Nares normal, no drainage or sinus tenderness   Throat: No abnormalities of the lips, oral mucosa or tongue. Neck: Trachea midline. Neck supple with no lymphadenopathy, thyroid not enlarged, symmetric, no tenderness/mass/nodules, flat neck vv   Lungs:   Mild wheezing L lung fields anteriorly   Chest Wall:  No deformity or tenderness to palpation   Heart:  Regular rate and rhythm, normal S1, normal S2, no murmur, no rub, no S3/S4, PMI non-palpable, no heave. Abdomen:   Soft, non-tender, with quiet bowel sounds. No masses, no hepatosplenomegaly   Extremities: No cyanosis, clubbing or pitting edema. Vascular: 2+ radial, dorsalis pedis and posterior tibial pulses bilaterally. Brisk carotid upstrokes without carotid bruit. Skin: Skin color, texture, turgor are normal with no rashes or ulceration.     Pysch: Unable to assess due to pt condition    Neurologic: Unable to assess due to pt condition         Labs:   CBC:   Lab Results   Component Value Date/Time    WBC 12.1 08/20/2022 05:55 AM    RBC 3.44 08/20/2022 05:55 AM    HGB 10.7 08/20/2022 05:55 AM    HCT 32.9 08/20/2022 05:55 AM    MCV 95.4 08/20/2022 05:55 AM    RDW 18.0 08/20/2022 05:55 AM     08/20/2022 05:55 AM     CMP:  Lab Results   Component Value Date/Time     08/20/2022 05:55 AM    K 4.6 08/20/2022 05:55 AM     08/20/2022 05:55 AM    CO2 26 08/20/2022 05:55 AM    BUN 56 08/20/2022 05:55 AM    CREATININE 0.7 08/20/2022 05:55 AM    GFRAA >60 08/20/2022 05:55 AM    GFRAA >60 04/16/2013 11:09 AM    AGRATIO 0.7 08/20/2022 05:55 AM    LABGLOM >60 08/20/2022 05:55 AM    GLUCOSE 251 08/20/2022 05:55 AM    PROT 5.5 08/20/2022 05:55 AM    PROT 6.8 06/17/2011 10:21 AM    CALCIUM 9.0 08/20/2022 05:55 AM    BILITOT 0.4 08/20/2022 05:55 AM    ALKPHOS 129 08/20/2022 05:55 AM    AST 29 08/20/2022 05:55 AM    ALT 18 08/20/2022 05:55 AM     PT/INR:  No results found for: PTINR  HgBA1c:  Lab Results   Component Value Date    LABA1C 5.8 03/19/2022     Lab Results   Component Value Date    CKTOTAL 38 03/21/2017    TROPONINI 0.02 (H) 08/18/2022         Interval Testing/Data:     Telemetry personally reviewed: Sinus bradycardia predominates with low amplitude P waves, chantel up 40's with average HR 60's. All EKG's reviewed. Echo 2019      Conclusions      Summary   Technically difficult examination. Normal left ventricle systolic function with an estimated ejection fraction   of 60-65%. Sigmoid septum (1.4 cm) without outflow tract obstruction. No regional wall motion abnormalities are seen. Grade I diastolic dysfunction with normal filing pressure. The left atrium is mildly dilated. Systolic pulmonary artery pressure (SPAP) is normal and estimated at 31 mmHg   (right atrial pressure 3 mmHg). Aortic valve sclerosis without stenosis. Echo 9/2017   Summary   The left ventricular systolic function is normal with an ejection fraction   of 55 %-60%. No regional wall motion abnormalities are noted. Mild concentric left ventricular hypertrophy. Aortic valve sclerosis without   stenosis. Grade I diastolic dysfunction with normal filling pressure.    Trivial mitral regurgitation. Systolic pulmonary artery pressure (SPAP) is normal and estimated at 31 mmHg   (RA pressure 3 mmHg). Evans 2011      Impression- Normal myocardial perfusion imaging study. No evident   Lexiscan induced cardiac ischemia. LVEF 78%. Impression and Plan      Atrial fibrillation with RVR   Sinus bradycardia  Tachy jamey syndrome  -Amiodarone was weaned  -Digoxin had been given but this is also been stopped with HR's observed  -still requiring pressors, low dose  -on fentanyl only at this time; no propofol. No AVN blockade.  -Anticoagulated with heparin; plan transition to [address] [place] [address] on recovery if she is appropriate candidate following discussion of goals of care with family. - Continue to observe rates. Plan for cardiac monitor study at DC to assess burden and for possible tachy-jamey syndrome, with EP follow up as OP.   -I expect bradycardia rates to improve post-extubation. Cardiology service will sign off - call for additional concerns however, should they arise.      Mildly elevated troponin with flat trend in the setting of sepsis, hypoxia, tachycardia  -Presentation not consistent with type I NSTEMI    Acute hypoxic respiratory failure  -sedated on ventilator  -CC manageing    Septic shock  Pneumococcal pneumonia  -IV anti-microbial therapy  -pressor support - wean as able  -IVF    History of essential hypertension  Diabetes mellitus type 2    Dementia    [id number] Score for Atrial Fibrillation Stroke Risk   Risk   Factors  Component Value   C CHF No 0   H HTN No 0   A2 Age >= 76 Yes,  (80 y.o.) 2   D DM Yes 1   S2 Prior Stroke/TIA No 0   V Vascular Disease No 0   A Age 74-69 No,  (80 y.o.) 0   Sc Sex female 1    [id number]  Score  4   Score last updated 8/19/22 7:26 AM EDT    Click here for a link to the UpToDate guideline \"Atrial Fibrillation: Anticoagulation therapy to prevent embolization    Disclaimer: Risk Score calculation is dependent on accuracy of patient problem list and past

## 2022-08-21 LAB
A/G RATIO: 0.8 (ref 1.1–2.2)
ALBUMIN SERPL-MCNC: 2.1 G/DL (ref 3.4–5)
ALP BLD-CCNC: 148 U/L (ref 40–129)
ALT SERPL-CCNC: 64 U/L (ref 10–40)
ANION GAP SERPL CALCULATED.3IONS-SCNC: 9 MMOL/L (ref 3–16)
ANISOCYTOSIS: ABNORMAL
ANTI-XA UNFRAC HEPARIN: 0.46 IU/ML (ref 0.3–0.7)
AST SERPL-CCNC: 90 U/L (ref 15–37)
BANDED NEUTROPHILS RELATIVE PERCENT: 2 % (ref 0–7)
BASOPHILS ABSOLUTE: 0 K/UL (ref 0–0.2)
BASOPHILS RELATIVE PERCENT: 0 %
BILIRUB SERPL-MCNC: 0.6 MG/DL (ref 0–1)
BLOOD CULTURE, ROUTINE: NORMAL
BUN BLDV-MCNC: 36 MG/DL (ref 7–20)
CALCIUM SERPL-MCNC: 8.9 MG/DL (ref 8.3–10.6)
CHLORIDE BLD-SCNC: 107 MMOL/L (ref 99–110)
CO2: 29 MMOL/L (ref 21–32)
CREAT SERPL-MCNC: <0.5 MG/DL (ref 0.6–1.2)
CULTURE, BLOOD 2: NORMAL
CULTURE, RESPIRATORY: ABNORMAL
EOSINOPHILS ABSOLUTE: 0 K/UL (ref 0–0.6)
EOSINOPHILS RELATIVE PERCENT: 0 %
GFR AFRICAN AMERICAN: >60
GFR NON-AFRICAN AMERICAN: >60
GLUCOSE BLD-MCNC: 105 MG/DL (ref 70–99)
GLUCOSE BLD-MCNC: 111 MG/DL (ref 70–99)
GLUCOSE BLD-MCNC: 112 MG/DL (ref 70–99)
GLUCOSE BLD-MCNC: 124 MG/DL (ref 70–99)
GLUCOSE BLD-MCNC: 183 MG/DL (ref 70–99)
GLUCOSE BLD-MCNC: 198 MG/DL (ref 70–99)
GLUCOSE BLD-MCNC: 82 MG/DL (ref 70–99)
GRAM STAIN RESULT: ABNORMAL
GRAM STAIN RESULT: ABNORMAL
HCT VFR BLD CALC: 32.6 % (ref 36–48)
HEMOGLOBIN: 11 G/DL (ref 12–16)
LYMPHOCYTES ABSOLUTE: 0.2 K/UL (ref 1–5.1)
LYMPHOCYTES RELATIVE PERCENT: 3 %
MCH RBC QN AUTO: 32 PG (ref 26–34)
MCHC RBC AUTO-ENTMCNC: 33.7 G/DL (ref 31–36)
MCV RBC AUTO: 94.9 FL (ref 80–100)
METAMYELOCYTES RELATIVE PERCENT: 1 %
MONOCYTES ABSOLUTE: 0.7 K/UL (ref 0–1.3)
MONOCYTES RELATIVE PERCENT: 11 %
MYELOCYTE PERCENT: 4 %
NEUTROPHILS ABSOLUTE: 5.8 K/UL (ref 1.7–7.7)
NEUTROPHILS RELATIVE PERCENT: 78 %
ORGANISM: ABNORMAL
PDW BLD-RTO: 17.7 % (ref 12.4–15.4)
PERFORMED ON: ABNORMAL
PERFORMED ON: NORMAL
PLATELET # BLD: 99 K/UL (ref 135–450)
PLATELET SLIDE REVIEW: ADEQUATE
PMV BLD AUTO: 7.6 FL (ref 5–10.5)
POLYCHROMASIA: ABNORMAL
POTASSIUM REFLEX MAGNESIUM: 4.4 MMOL/L (ref 3.5–5.1)
PROMYELOCYTES PERCENT: 1 %
RBC # BLD: 3.44 M/UL (ref 4–5.2)
SLIDE REVIEW: ABNORMAL
SODIUM BLD-SCNC: 145 MMOL/L (ref 136–145)
TOTAL PROTEIN: 4.8 G/DL (ref 6.4–8.2)
VANCOMYCIN TROUGH: 11.2 UG/ML (ref 10–20)
WBC # BLD: 6.7 K/UL (ref 4–11)

## 2022-08-21 PROCEDURE — 31720 CLEARANCE OF AIRWAYS: CPT

## 2022-08-21 PROCEDURE — 2580000003 HC RX 258: Performed by: INTERNAL MEDICINE

## 2022-08-21 PROCEDURE — 99291 CRITICAL CARE FIRST HOUR: CPT | Performed by: INTERNAL MEDICINE

## 2022-08-21 PROCEDURE — 6360000002 HC RX W HCPCS: Performed by: INTERNAL MEDICINE

## 2022-08-21 PROCEDURE — 94761 N-INVAS EAR/PLS OXIMETRY MLT: CPT

## 2022-08-21 PROCEDURE — 2580000003 HC RX 258: Performed by: NURSE PRACTITIONER

## 2022-08-21 PROCEDURE — 85520 HEPARIN ASSAY: CPT

## 2022-08-21 PROCEDURE — 2700000000 HC OXYGEN THERAPY PER DAY

## 2022-08-21 PROCEDURE — 2000000000 HC ICU R&B

## 2022-08-21 PROCEDURE — 80053 COMPREHEN METABOLIC PANEL: CPT

## 2022-08-21 PROCEDURE — 85025 COMPLETE CBC W/AUTO DIFF WBC: CPT

## 2022-08-21 PROCEDURE — 80202 ASSAY OF VANCOMYCIN: CPT

## 2022-08-21 PROCEDURE — 36592 COLLECT BLOOD FROM PICC: CPT

## 2022-08-21 PROCEDURE — 36415 COLL VENOUS BLD VENIPUNCTURE: CPT

## 2022-08-21 PROCEDURE — 99232 SBSQ HOSP IP/OBS MODERATE 35: CPT | Performed by: INTERNAL MEDICINE

## 2022-08-21 PROCEDURE — 86022 PLATELET ANTIBODIES: CPT

## 2022-08-21 RX ORDER — FONDAPARINUX SODIUM 7.5 MG/.6ML
7.5 INJECTION SUBCUTANEOUS DAILY
Status: DISCONTINUED | OUTPATIENT
Start: 2022-08-21 | End: 2022-08-30 | Stop reason: HOSPADM

## 2022-08-21 RX ORDER — DEXTROSE AND SODIUM CHLORIDE 5; .45 G/100ML; G/100ML
INJECTION, SOLUTION INTRAVENOUS CONTINUOUS
Status: DISCONTINUED | OUTPATIENT
Start: 2022-08-21 | End: 2022-08-22

## 2022-08-21 RX ADMIN — CEFTRIAXONE 2000 MG: 2 INJECTION, POWDER, FOR SOLUTION INTRAMUSCULAR; INTRAVENOUS at 10:01

## 2022-08-21 RX ADMIN — VANCOMYCIN HYDROCHLORIDE 750 MG: 750 INJECTION, POWDER, LYOPHILIZED, FOR SOLUTION INTRAVENOUS at 21:47

## 2022-08-21 RX ADMIN — MUPIROCIN: 20 OINTMENT TOPICAL at 08:14

## 2022-08-21 RX ADMIN — MUPIROCIN: 20 OINTMENT TOPICAL at 21:43

## 2022-08-21 RX ADMIN — Medication 10 ML: at 21:20

## 2022-08-21 RX ADMIN — Medication: at 08:17

## 2022-08-21 RX ADMIN — DEXTROSE AND SODIUM CHLORIDE: 5; 450 INJECTION, SOLUTION INTRAVENOUS at 08:46

## 2022-08-21 RX ADMIN — HYDROCORTISONE SODIUM SUCCINATE 25 MG: 100 INJECTION, POWDER, FOR SOLUTION INTRAMUSCULAR; INTRAVENOUS at 08:14

## 2022-08-21 RX ADMIN — VANCOMYCIN HYDROCHLORIDE 750 MG: 750 INJECTION, POWDER, LYOPHILIZED, FOR SOLUTION INTRAVENOUS at 10:58

## 2022-08-21 RX ADMIN — Medication 10 ML: at 08:14

## 2022-08-21 RX ADMIN — FONDAPARINUX SODIUM 7.5 MG: 7.5 INJECTION, SOLUTION SUBCUTANEOUS at 11:49

## 2022-08-21 RX ADMIN — Medication: at 19:45

## 2022-08-21 RX ADMIN — HYDROCORTISONE SODIUM SUCCINATE 25 MG: 100 INJECTION, POWDER, FOR SOLUTION INTRAMUSCULAR; INTRAVENOUS at 21:42

## 2022-08-21 NOTE — PROGRESS NOTES
Pharmacy - RE:  Low-dose Heparin drip  Current rate = 9.5 ml/h  (950 units/h)  Anti-Xa drawn @ 0431 = 0.46  Goal anti-Xa = 0.3 - 0.7   Per protocol, continue with 950 units/hr (9.5ml/hr) rate and daily anti-Xa draws. Next anti-Xa due 8/22 with AM lab draws.

## 2022-08-21 NOTE — PROGRESS NOTES
Pt continues to rest quietly and no further changes noted in exam. Vitals and SpO2 stable. Pt has required periodic NT suctioning to assist in clearing secretions. All lines and monitoring devices remain in place. Pt repositioned in bed. Continue current plan of care.  Marilyn Talavera RN

## 2022-08-21 NOTE — PROGRESS NOTES
Initial exam completed- See doc flowsheet for assessment findings. Pt resting quietly in bed awake and becomes slightly restless with am care. She mumbles words that are not intelligible. She does not follow commands All lines and monitoring devices are in place . Vitals and SpO2 stable. On 9 LPM HFNC. Call light is within easy reach. Plan of care and goals reviewed.  Patient is not able to demonstrate the ability to move from a reclining position to an upright position within the recliner due to Pt on bedrest. Brenna Gilman RN

## 2022-08-21 NOTE — PLAN OF CARE
Problem: Discharge Planning  Goal: Discharge to home or other facility with appropriate resources  Outcome: Progressing     Problem: Pain  Goal: Verbalizes/displays adequate comfort level or baseline comfort level  Outcome: Progressing     Problem: Chronic Conditions and Co-morbidities  Goal: Patient's chronic conditions and co-morbidity symptoms are monitored and maintained or improved  Outcome: Progressing     Problem: Nutrition Deficit:  Goal: Optimize nutritional status  Outcome: Progressing     Problem: Safety - Adult  Goal: Free from fall injury  Outcome: Progressing  Note: Fall precautions in place. Problem: Skin/Tissue Integrity  Goal: Absence of new skin breakdown  Description: 1. Monitor for areas of redness and/or skin breakdown  2. Assess vascular access sites hourly  3. Every 4-6 hours minimum:  Change oxygen saturation probe site  4. Every 4-6 hours:  If on nasal continuous positive airway pressure, respiratory therapy assess nares and determine need for appliance change or resting period. Outcome: Progressing  Note: Patient turned/repositioned every 2 hours and as needed to maintain and improve skin integrity.

## 2022-08-21 NOTE — PROGRESS NOTES
Patient updates given to Rm Kolb RN. Patient resting comfortably at present, has been NT suctioned X2 overnight. Levophed has been weaned off. Care transferred.

## 2022-08-21 NOTE — PROGRESS NOTES
Care rounds completed with Dr Khai Blankenship and RT. Reviewed labs, meds, VS (temp/HR/RR), I/O's, assessment, & plan of care for today. See progress note & new orders for details.  Gricelda Yan RN

## 2022-08-21 NOTE — PROGRESS NOTES
RT Nebulizer Bronchodilator Protocol Note    There is a bronchodilator order in the chart from a provider indicating to follow the RT Bronchodilator Protocol and there is an Initiate RT Bronchodilator Protocol order as well (see protocol at bottom of note). CXR Findings:  XR CHEST PORTABLE    Result Date: 8/20/2022  Patchy infiltrates seen within the lungs bilaterally which may represent multifocal pneumonia. The findings from the last RT Protocol Assessment were as follows:  Smoking: (P) Smoker 15 pack years or more  Respiratory Pattern: (P) Regular pattern and RR 12-20 bpm  Breath Sounds: (P) Clear breath sounds  Cough: (P) Strong, productive  Indication for Bronchodilator Therapy: (P) Decreased or absent breath sounds  Bronchodilator Assessment Score: (P) 2    Aerosolized bronchodilator medication orders have been revised according to the RT Nebulizer Bronchodilator Protocol below. Respiratory Therapist to perform RT Therapy Protocol Assessment initially then follow the protocol. Repeat RT Therapy Protocol Assessment PRN for score 0-3 or on second treatment, BID, and PRN for scores above 3. No Indications - adjust the frequency to every 6 hours PRN wheezing or bronchospasm, if no treatments needed after 48 hours then discontinue using Per Protocol order mode. If indication present, adjust the RT bronchodilator orders based on the Bronchodilator Assessment Score as indicated below. If a patient is on this medication at home then do not decrease Frequency below that used at home. 0-3 - enter or revise RT bronchodilator order(s) to equivalent RT Bronchodilator order with Frequency of every 4 hours PRN for wheezing or increased work of breathing using Per Protocol order mode.        4-6 - enter or revise RT Bronchodilator order(s) to two equivalent RT bronchodilator orders with one order with BID Frequency and one order with Frequency of every 4 hours PRN wheezing or increased work of breathing using Per Protocol order mode. 7-10 - enter or revise RT Bronchodilator order(s) to two equivalent RT bronchodilator orders with one order with TID Frequency and one order with Frequency of every 4 hours PRN wheezing or increased work of breathing using Per Protocol order mode. 11-13 - enter or revise RT Bronchodilator order(s) to one equivalent RT bronchodilator order with QID Frequency and an Albuterol order with Frequency of every 4 hours PRN wheezing or increased work of breathing using Per Protocol order mode. Greater than 13 - enter or revise RT Bronchodilator order(s) to one equivalent RT bronchodilator order with every 4 hours Frequency and an Albuterol order with Frequency of every 2 hours PRN wheezing or increased work of breathing using Per Protocol order mode. RT to enter RT Home Evaluation for COPD & MDI Assessment order using Per Protocol order mode.     Electronically signed by Kayleigh Arroyo RCP on 8/21/2022 at 2:48 PM

## 2022-08-21 NOTE — PROGRESS NOTES
Pharmacy Vancomycin Consult     Vancomycin Day: 3  Current Dosinmg q24h  Current indication: asp PNA      Recent Labs     22  0555 22  0431   BUN 56* 36*   CREATININE 0.7 <0.5*   WBC 12.1* 6.7       Intake/Output Summary (Last 24 hours) at 2022 1041  Last data filed at 2022 0846  Gross per 24 hour   Intake 1457.15 ml   Output 1075 ml   Net 382.15 ml     Culture Date      Source                       Results                       sputum                       MRSA    Ht Readings from Last 1 Encounters:   22 5' 6\" (1.676 m)        Wt Readings from Last 1 Encounters:   22 132 lb 7.9 oz (60.1 kg)       Body mass index is 21.39 kg/m². Estimated Creatinine Clearance: 81 mL/min (based on SCr of 0.5 mg/dL). Trough: 11.2    Assessment/Plan:Regimen: increase vanco to 750 mg IV every 12 hours. Start time: 11:00 on 2022  Exposure target: AUC24 (range)400-600 mg/L.hr   AUC24,ss: 440 mg/L.hr  Probability of AUC24 > 400: 72 %  Ctrough,ss: 12.9 mg/L  Probability of Ctrough,ss > 20: 2 %  Probability of nephrotoxicity (Lodise MILAN ): 8 %    Trough  at 2230.     Ligia Lozada [facility]

## 2022-08-21 NOTE — PROGRESS NOTES
ICU Progress Note    Admit Date:  8/17/2022  4    Interval history:    pneumonia, on vent   A. fib RVR, placed on heparin drip  Hypotensive on Levophed. Extubated on  8/20, patient remains on high flow oxygen. Weaned off of Levophed drip    Subjective:  Ms. Godfrey Oconnor seen . She is awake, confused, not oriented. Constantly moaning and mumbling. She has advanced dementia. Remains  on high flow oxygen 9 to 10 L. Increased secretions requiring frequent suctioning. NPO, swallowing eval in a.m. Limited code; family does want reintubation if respiratory status worsens    Objective:   /84   Pulse 63   Temp 97.7 °F (36.5 °C) (Bladder)   Resp 18   Ht 5' 6\" (1.676 m)   Wt 132 lb 7.9 oz (60.1 kg)   SpO2 98%   BMI 21.39 kg/m²     Intake/Output Summary (Last 24 hours) at 8/21/2022 1811  Last data filed at 8/21/2022 1343  Gross per 24 hour   Intake 1158.2 ml   Output 685 ml   Net 473.2 ml         Physical Exam:    General: elderly female,   Awake , responding a little , confused  . Mumbling  Appears to be not in any distress  Mucous Membranes:  Pink , anicteric  Neck: No JVD, no carotid bruit, no thyromegaly  Chest: Diminished breath sounds with scattered crackles  Cardiovascular:  RRR S1S2 heard, no murmurs or gallops  Abdomen:  Soft, undistended, non tender, no organomegaly, BS present  Extremities: right femoral TLC   No edema or cyanosis.  Distal pulses well felt  Neurological : Nonfocal       Medications:   Scheduled Medications:    hydrocortisone sodium succinate PF  25 mg IntraVENous Q12H    fondaparinux  7.5 mg SubCUTAneous Daily    vancomycin  750 mg IntraVENous Q12H    cefTRIAXone (ROCEPHIN) IV  2,000 mg IntraVENous Q24H    Venelex   Topical BID    sodium chloride flush  5-40 mL IntraVENous 2 times per day    insulin lispro  0-4 Units SubCUTAneous Q4H    mupirocin   Nasal BID     I   dextrose 5 % and 0.45 % NaCl 60 mL/hr at 08/21/22 1343    sodium chloride       ipratropium-albuterol, sodium chloride flush, sodium chloride, ondansetron **OR** ondansetron, polyethylene glycol, acetaminophen **OR** acetaminophen    Lab Data:  Recent Labs     08/19/22 0445 08/20/22  0555 08/21/22  0431   WBC 17.9* 12.1* 6.7   HGB 10.8* 10.7* 11.0*   HCT 31.9* 32.9* 32.6*   MCV 95.0 95.4 94.9    131* 99*       Recent Labs     08/19/22 0445 08/20/22  0555 08/21/22  0431    144 145   K 3.1* 4.6 4.4    106 107   CO2 26 26 29   BUN 63* 56* 36*   CREATININE 1.0 0.7 <0.5*       No results for input(s): CKTOTAL, CKMB, CKMBINDEX, TROPONINI in the last 72 hours. Coagulation:   Lab Results   Component Value Date/Time    INR 1.24 08/17/2022 12:10 PM    APTT 24.1 08/17/2022 02:52 PM     Cardiac markers:   Lab Results   Component Value Date/Time    CKTOTAL 38 03/21/2017 11:23 AM    TROPONINI 0.02 08/18/2022 03:39 AM         Lab Results   Component Value Date    ALT 64 (H) 08/21/2022    AST 90 (H) 08/21/2022    ALKPHOS 148 (H) 08/21/2022    BILITOT 0.6 08/21/2022       Lab Results   Component Value Date    INR 1.24 (H) 08/17/2022    INR 1.06 09/05/2017    INR 1.13 04/19/2017    PROTIME 15.5 (H) 08/17/2022    PROTIME 12.3 09/05/2017    PROTIME 12.8 04/19/2017       Cultures  Covid not detected  Resp cx strep pneumonia staph aureus  Strep pneumoniae - pneumococcal pneumonia    EKG  Afibb with RVR     Radiology:  XR CHEST PORTABLE   Final Result   Patchy infiltrates seen within the lungs bilaterally which may represent   multifocal pneumonia. XR CHEST PORTABLE   Final Result   Slightly improving aeration of the lungs in this intubated patient. XR CHEST PORTABLE   Final Result   No significant interval change in moderate left-sided airspace disease as   compared to prior. Trace bilateral pleural effusions or pleural thickening. CT ABDOMEN PELVIS W IV CONTRAST Additional Contrast? None   Final Result   1. No pulmonary embolism. 2. Multifocal airspace opacities most prominent in the left lower lobe. Pattern likely represents developing pneumonia. ARDS may be considered. 3. Distended gallbladder with no pattern of inflammation or biliary   dilatation. This may be due to fasting status or other systemic processes   described above. 4. Multiple cystic lesions within the pancreas that have developed or are   increased from a prior study in 2016. Recommend follow-up imaging in 2 years   to ensure stability. 5. Advanced atherosclerotic changes of the aorta in the lower abdomen with   stenosis noted without occlusion. CT CHEST PULMONARY EMBOLISM W CONTRAST   Final Result   1. No pulmonary embolism. 2. Multifocal airspace opacities most prominent in the left lower lobe. Pattern likely represents developing pneumonia. ARDS may be considered. 3. Distended gallbladder with no pattern of inflammation or biliary   dilatation. This may be due to fasting status or other systemic processes   described above. 4. Multiple cystic lesions within the pancreas that have developed or are   increased from a prior study in 2016. Recommend follow-up imaging in 2 years   to ensure stability. 5. Advanced atherosclerotic changes of the aorta in the lower abdomen with   stenosis noted without occlusion. CT CERVICAL SPINE WO CONTRAST   Final Result   No acute abnormality of the cervical spine. CT HEAD WO CONTRAST   Final Result   No acute intracranial abnormality. XR CHEST PORTABLE   Final Result   1. Left-sided pneumonia. Recommend imaging follow-up to resolution. 2. Devices in place as above. ASSESMENT & PLAN:      Pneumococcal pneumonia   - imaging with Multifocal pneumonia- cx with strep pneumo   - started on cefepime and vanc- downgraded to rocephin with cx   - blood cx remain neg   -S/p mechanical ventilation  Continue current antibiotics.   Seen by pulmonary critical care    Acute hypoxemic resp failure  - sec to pneumonia  - started on vent support by EMS at field  - vent mx per pulmonary  S/p mechanical ventilation, patient extubated 8/20 . Remains on high flow oxygen, 10 L->  wean O2 as tolerated    Sepsis- severe with shock  - hypotensive despite IVF boluses ; was requiring pressors Levophed and vasopressin . Off pressors now . - continue IVF  - added stress dose steroids     Afibb with RVR   - converted to NSR with amio and digoxin  -Cardiology consulted  -TSH wnl  - Heparin gtt started, plan to set up cardiac monitor at dc and NOAC if appropriate    [id number] Score for Atrial Fibrillation Stroke Risk   Risk   Factors  Component Value   C CHF No 0   H HTN No 0   A2 Age >= 76 Yes,  (80 y.o.) 2   D DM Yes 1   S2 Prior Stroke/TIA No 0   V Vascular Disease No 0   A Age 74-69 No,  (80 y.o.) 0   Sc Sex female 1    [id number]  Score  4     On Heparin drip. Continue anticoagulant  Will need to switch to oral anticoagulants when able. DM 2  - holding home meds, currently on ssi     Acute renal failure  - sec to sepsis- likely ATN  - improving with IVF, continue IVF     Advanced dementia  -Limited code  But family does want reintubation if needed ; no CPR no defib      Diet: npo , on  TF . Attempt at swallowing eval in a.m.   GI/DVT PX: heparin gtt   CODE STATUS: limited     Shannon Beyer MD, 8/21/2022 6:11 PM

## 2022-08-21 NOTE — PROGRESS NOTES
Pulmonary & Critical Care Medicine ICU Progress Note    CC: Acute respiratory failure    Events of Last 24 hours:   Extubated  Requiring high flow oxygen    Invasive Lines: 2022 right femoral CVC in ER    MV: 2022  Vent Mode: AC/VC Resp Rate (Set): 16 bmp/Vt (Set, mL): 355 mL/ /FiO2 : 40 %  Recent Labs     22  0440 22  0555   PHART 7.478* 7.459*   [id number] 33.7* 40.6   PO2ART 79.2 80.2       IV:   norepinephrine Stopped (22 0019)    vasopressin (Septic Shock) infusion Stopped (22 1037)    heparin (PORCINE) Infusion 950 Units/hr (22)    sodium chloride         Vitals:  Blood pressure (!) 120/96, pulse 73, temperature 98.3 °F (36.8 °C), temperature source Bladder, resp. rate 14, height 5' 6\" (1.676 m), weight 132 lb 7.9 oz (60.1 kg), SpO2 96 %. on ventilator  Temp  Av.3 °F (36.8 °C)  Min: 97.8 °F (36.6 °C)  Max: 98.7 °F (37.1 °C)    Intake/Output Summary (Last 24 hours) at 2022 0713  Last data filed at 2022 0431  Gross per 24 hour   Intake 1457.15 ml   Output 1005 ml   Net 452.15 ml     EXAM:  General:  ill appearing    Resp: No crackles. No wheezing. CV: S1, S2. No edema  GI: NT, ND, +BS  Skin: Warm and dry. Neuro: PERRL.  Awake but speech unintelligible       Scheduled Meds:   hydrocortisone sodium succinate PF  50 mg IntraVENous Q12H    vancomycin  750 mg IntraVENous Q24H    cefTRIAXone (ROCEPHIN) IV  2,000 mg IntraVENous Q24H    Venelex   Topical BID    sodium chloride flush  5-40 mL IntraVENous 2 times per day    insulin lispro  0-4 Units SubCUTAneous Q4H    famotidine (PEPCID) injection  20 mg IntraVENous Daily    mupirocin   Nasal BID     PRN Meds:  ipratropium-albuterol, heparin (porcine), heparin (porcine), sodium chloride flush, sodium chloride, ondansetron **OR** ondansetron, polyethylene glycol, acetaminophen **OR** acetaminophen, [DISCONTINUED] fentaNYL **AND** fentaNYL    Results:  CBC:   Recent Labs     22  0445 22  0524 22  0431   WBC 17.9* 12.1* 6.7   HGB 10.8* 10.7* 11.0*   HCT 31.9* 32.9* 32.6*   MCV 95.0 95.4 94.9    131* 99*     BMP:   Recent Labs     22  0445 22  0555 22  0431    144  --    K 3.1* 4.6  --     106  --    CO2 26 26 29   BUN 63* 56* 36*   CREATININE 1.0 0.7 <0.5*     LIVER PROFILE:   Recent Labs     225 22  0555 22  0431   AST 15 29 90*   ALT 11 18 64*   BILITOT 1.7* 0.4 0.6   ALKPHOS 95 129 148*     Cultures:  2022 SARS-CoV-2 and influenza are negative  2022 blood sent  2022 streptococcal pneumonia antigen is positive  2022 tracheal aspirate MRSA (LEATHA 1) and streptococcal pneumonia;      Imagin2022 C-spine no acute abnormality  2022 chest CT left greater than right lower lobe predominant airspace disease  2022 abdominal CT gallbladder distention, cystic pancreatic lesions, atherosclerotic disease of aorta    2022 CXR left-sided airspace disease    ECHO 19 with EF 60-65%, Grade I DD     ASSESSMENT:  Acute hypoxemic respiratory failure   Septic shock  Acute kidney failure has resolved  A. fib RVR, has converted to sinus bradycardia  Streptococcal pneumoniae pneumonia, respiratory culture also with MRSA  Dementia of the Alzheimer's type  Chronic nursing home resident  Diabetes  Low voltage EKG  Acute transaminitis  Thrombocytopenia      PLAN:  High flow nasal oxygen for life threatening respiratory failure   Antibiotic D#5, CTX/Vanc  IV levophed is now off   Taper hydrocortisone  Blood sugar control, with goal 140-180   Change heparin to arixtra until able to give DOAC, send HIT panel    Prophylaxis:  Bactroban  DNR CCA  Close monitoring in ICU, high risk for re-intubation     Total critical care time caring for this patient with life threatening, unstable organ failure, including direct patient contact, management of life support systems, review of data including imaging and labs, discussions with other team members and physicians at least 33 minutes so far today, excluding procedures.

## 2022-08-21 NOTE — PROGRESS NOTES
Call received from [facility], patient with MRSA in sputum culture. Spoke with Yasmani Abebe in pharmacy, patient on Vancomycin, which will cover MRSA. Placed in contact isolation.

## 2022-08-22 LAB
A/G RATIO: 0.7 (ref 1.1–2.2)
ALBUMIN SERPL-MCNC: 2 G/DL (ref 3.4–5)
ALP BLD-CCNC: 174 U/L (ref 40–129)
ALT SERPL-CCNC: 54 U/L (ref 10–40)
ANION GAP SERPL CALCULATED.3IONS-SCNC: 7 MMOL/L (ref 3–16)
AST SERPL-CCNC: 56 U/L (ref 15–37)
BILIRUB SERPL-MCNC: 0.5 MG/DL (ref 0–1)
BUN BLDV-MCNC: 26 MG/DL (ref 7–20)
CALCIUM SERPL-MCNC: 8.7 MG/DL (ref 8.3–10.6)
CHLORIDE BLD-SCNC: 104 MMOL/L (ref 99–110)
CO2: 25 MMOL/L (ref 21–32)
CREAT SERPL-MCNC: <0.5 MG/DL (ref 0.6–1.2)
GFR AFRICAN AMERICAN: >60
GFR NON-AFRICAN AMERICAN: >60
GLUCOSE BLD-MCNC: 106 MG/DL (ref 70–99)
GLUCOSE BLD-MCNC: 106 MG/DL (ref 70–99)
GLUCOSE BLD-MCNC: 109 MG/DL (ref 70–99)
GLUCOSE BLD-MCNC: 129 MG/DL (ref 70–99)
GLUCOSE BLD-MCNC: 136 MG/DL (ref 70–99)
GLUCOSE BLD-MCNC: 141 MG/DL (ref 70–99)
GLUCOSE BLD-MCNC: 198 MG/DL (ref 70–99)
GLUCOSE BLD-MCNC: 284 MG/DL (ref 70–99)
GLUCOSE BLD-MCNC: 315 MG/DL (ref 70–99)
GLUCOSE BLD-MCNC: 56 MG/DL (ref 70–99)
GLUCOSE BLD-MCNC: 64 MG/DL (ref 70–99)
GLUCOSE BLD-MCNC: 64 MG/DL (ref 70–99)
GLUCOSE BLD-MCNC: 70 MG/DL (ref 70–99)
GLUCOSE BLD-MCNC: 73 MG/DL (ref 70–99)
GLUCOSE BLD-MCNC: 79 MG/DL (ref 70–99)
GLUCOSE BLD-MCNC: 91 MG/DL (ref 70–99)
GLUCOSE BLD-MCNC: 93 MG/DL (ref 70–99)
HCT VFR BLD CALC: 31.2 % (ref 36–48)
HEMOGLOBIN: 10.2 G/DL (ref 12–16)
HEPARIN INDUCED PLATELET ANTIBODY: POSITIVE
MCH RBC QN AUTO: 31.5 PG (ref 26–34)
MCHC RBC AUTO-ENTMCNC: 32.7 G/DL (ref 31–36)
MCV RBC AUTO: 96.4 FL (ref 80–100)
PDW BLD-RTO: 18.2 % (ref 12.4–15.4)
PERFORMED ON: ABNORMAL
PERFORMED ON: NORMAL
PLATELET # BLD: 108 K/UL (ref 135–450)
PMV BLD AUTO: 8.4 FL (ref 5–10.5)
POTASSIUM REFLEX MAGNESIUM: 4.8 MMOL/L (ref 3.5–5.1)
RBC # BLD: 3.24 M/UL (ref 4–5.2)
REASON FOR REJECTION: NORMAL
REJECTED TEST: NORMAL
SODIUM BLD-SCNC: 136 MMOL/L (ref 136–145)
TOTAL PROTEIN: 4.9 G/DL (ref 6.4–8.2)
VANCOMYCIN TROUGH: 17 UG/ML (ref 10–20)
WBC # BLD: 6.9 K/UL (ref 4–11)

## 2022-08-22 PROCEDURE — 2580000003 HC RX 258: Performed by: INTERNAL MEDICINE

## 2022-08-22 PROCEDURE — 94761 N-INVAS EAR/PLS OXIMETRY MLT: CPT

## 2022-08-22 PROCEDURE — 6360000002 HC RX W HCPCS: Performed by: INTERNAL MEDICINE

## 2022-08-22 PROCEDURE — 84450 TRANSFERASE (AST) (SGOT): CPT

## 2022-08-22 PROCEDURE — 84132 ASSAY OF SERUM POTASSIUM: CPT

## 2022-08-22 PROCEDURE — 2000000000 HC ICU R&B

## 2022-08-22 PROCEDURE — 99291 CRITICAL CARE FIRST HOUR: CPT | Performed by: INTERNAL MEDICINE

## 2022-08-22 PROCEDURE — 2700000000 HC OXYGEN THERAPY PER DAY

## 2022-08-22 PROCEDURE — 84460 ALANINE AMINO (ALT) (SGPT): CPT

## 2022-08-22 PROCEDURE — 80053 COMPREHEN METABOLIC PANEL: CPT

## 2022-08-22 PROCEDURE — 36415 COLL VENOUS BLD VENIPUNCTURE: CPT

## 2022-08-22 PROCEDURE — 80202 ASSAY OF VANCOMYCIN: CPT

## 2022-08-22 PROCEDURE — 2580000003 HC RX 258: Performed by: NURSE PRACTITIONER

## 2022-08-22 PROCEDURE — 86022 PLATELET ANTIBODIES: CPT

## 2022-08-22 PROCEDURE — 85027 COMPLETE CBC AUTOMATED: CPT

## 2022-08-22 RX ORDER — DEXTROSE MONOHYDRATE 100 MG/ML
INJECTION, SOLUTION INTRAVENOUS CONTINUOUS PRN
Status: DISCONTINUED | OUTPATIENT
Start: 2022-08-22 | End: 2022-08-30 | Stop reason: HOSPADM

## 2022-08-22 RX ORDER — SODIUM CHLORIDE, SODIUM LACTATE, POTASSIUM CHLORIDE, CALCIUM CHLORIDE 600; 310; 30; 20 MG/100ML; MG/100ML; MG/100ML; MG/100ML
INJECTION, SOLUTION INTRAVENOUS CONTINUOUS
Status: DISCONTINUED | OUTPATIENT
Start: 2022-08-22 | End: 2022-08-23

## 2022-08-22 RX ADMIN — Medication 10 ML: at 20:24

## 2022-08-22 RX ADMIN — MUPIROCIN: 20 OINTMENT TOPICAL at 08:03

## 2022-08-22 RX ADMIN — DEXTROSE MONOHYDRATE 125 ML: 100 INJECTION, SOLUTION INTRAVENOUS at 04:50

## 2022-08-22 RX ADMIN — CEFTRIAXONE 2000 MG: 2 INJECTION, POWDER, FOR SOLUTION INTRAMUSCULAR; INTRAVENOUS at 11:11

## 2022-08-22 RX ADMIN — VANCOMYCIN HYDROCHLORIDE 750 MG: 750 INJECTION, POWDER, LYOPHILIZED, FOR SOLUTION INTRAVENOUS at 23:46

## 2022-08-22 RX ADMIN — Medication: at 20:24

## 2022-08-22 RX ADMIN — Medication: at 08:03

## 2022-08-22 RX ADMIN — DEXTROSE AND SODIUM CHLORIDE: 5; 450 INJECTION, SOLUTION INTRAVENOUS at 04:08

## 2022-08-22 RX ADMIN — VANCOMYCIN HYDROCHLORIDE 750 MG: 750 INJECTION, POWDER, LYOPHILIZED, FOR SOLUTION INTRAVENOUS at 12:35

## 2022-08-22 RX ADMIN — HYDROCORTISONE SODIUM SUCCINATE 25 MG: 100 INJECTION, POWDER, FOR SOLUTION INTRAMUSCULAR; INTRAVENOUS at 08:03

## 2022-08-22 RX ADMIN — Medication 10 ML: at 08:03

## 2022-08-22 RX ADMIN — DEXTROSE MONOHYDRATE 125 ML: 100 INJECTION, SOLUTION INTRAVENOUS at 16:55

## 2022-08-22 RX ADMIN — SODIUM CHLORIDE, POTASSIUM CHLORIDE, SODIUM LACTATE AND CALCIUM CHLORIDE: 600; 310; 30; 20 INJECTION, SOLUTION INTRAVENOUS at 11:05

## 2022-08-22 RX ADMIN — FONDAPARINUX SODIUM 7.5 MG: 7.5 INJECTION, SOLUTION SUBCUTANEOUS at 08:13

## 2022-08-22 NOTE — PROGRESS NOTES
4 Eyes Skin Assessment     The patient is being assess for   Shift Handoff    I agree that 2 RN's have performed a thorough Head to Toe Skin Assessment on the patient. ALL assessment sites listed below have been assessed. Areas assessed for pressure by both nurses:   [x]   Head, Face, and Ears   [x]   Shoulders, Back, and Chest, Abdomen  [x]   Arms, Elbows, and Hands   [x]   Coccyx, Sacrum, and Ischium  [x]   Legs, Feet, and Heels        Skin Assessed Under all Medical Devices by both nurses:  O2 device tubing, may, securement devices, and heel boots              All Mepilex Borders were peeled back and area peeked at by both nurses:  No: NA  Please list where Mepilex Borders are located:  NA              **SHARE this note so that the co-signing nurse is able to place an eSignature**    Co-signer eSignature: Electronically signed by Kay Paniagua RN on 8/22/22 at 6:36 AM EDT    Does the Patient have Skin Breakdown related to pressure?   Yes [facility]DA WOUND CARE was Initiated documentation include the Nereida-wound, Wound Assessment, Measurements, Dressing Treatment, Drainage, and Color\",     (Insert Photo here  )         Bobby Prevention initiated:  Yes   Wound Care Orders initiated:  Yes      [id number] nurse consulted for Pressure Injury (Stage 3,4, Unstageable, DTI, NWPT, Complex wounds)and New or Established Ostomies:  Yes      Primary Nurse eSignature: Electronically signed by Odilia Lombard, RN on 8/22/22 at 6:35 AM EDT

## 2022-08-22 NOTE — PROGRESS NOTES
SLP Attempt Note        Name: Chandra Spencer  : 1940  Medical Diagnosis: Multifocal pneumonia [J18.9]    SLP attempting to see the patient for dysphagia evaluation. Per RN, the patient is not appropriate a this time. Not maintaining adequate PAWAN. Will re-attempt at later time as schedule allows. No charges filed.  Thank you,    Josie Pereira M.A., [address] [facility]  Speech-Language Pathologist  Phone: [phone]

## 2022-08-22 NOTE — PROGRESS NOTES
Patient report given to BALTA Cosby. Patient resting comfortably at present. FSBS low this AM, received 125mL D10% per PRN order. Care transferred.

## 2022-08-22 NOTE — PROGRESS NOTES
Patient care assumed, assessment completed as charted. Patient  resting in bed, desaturating to 86% on 10L/HFNC. Patient with weak, congested cough and rhonchi throughout lung fields. NT suctioned for large amount thick yellow sputum, with improvement in SpO2 to 94%. Right femoral CVC in place with D5% 1/2 NS infusing at 60mL/hr, may catheter patent. No further needs assessed at this time. Will monitor.

## 2022-08-22 NOTE — PROGRESS NOTES
4 Eyes Skin Assessment     The patient is being assess for   Shift Handoff    I agree that 2 RN's have performed a thorough Head to Toe Skin Assessment on the patient. ALL assessment sites listed below have been assessed. Areas assessed for pressure by both nurses:   [x]   Head, Face, and Ears   [x]   Shoulders, Back, and Chest, Abdomen  [x]   Arms, Elbows, and Hands   [x]   Coccyx, Sacrum, and Ischium  [x]   Legs, Feet, and Heels        Skin Assessed Under all Medical Devices by both nurses:  O2 device tubing, may, securement devices, and heel boots              All Mepilex Borders were peeled back and area peeked at by both nurses:  Yes  Please list where Mepilex Borders are located:  coccyx             **SHARE this note so that the co-signing nurse is able to place an eSignature**    Co-signer eSignature: {Esignature:[id number]}    Does the Patient have Skin Breakdown related to pressure?   Yes LDA WOUND CARE was Initiated documentation include the Nereida-wound, Wound Assessment, Measurements, Dressing Treatment, Drainage, and Color\",     (Insert Photo here***)         Bobby Prevention initiated:  Yes   Wound Care Orders initiated:  Yes      [address]  nurse consulted for Pressure Injury (Stage 3,4, Unstageable, DTI, NWPT, Complex wounds)and New or Established Ostomies:  Yes      Primary Nurse eSignature: Electronically signed by Juwan Elise RN on 8/22/22 at 5:53 PM EDT

## 2022-08-22 NOTE — PROGRESS NOTES
High risk vesicant drug infusing:  __________    Multiple incompatible medications infusing:  _________    CVP Monitoring:  _________    Extremely difficult IV access challenge:  ___x_____    Continued need for central line access:  ____x______    Addressed with physician:  ___x_____    RIGHT PATIENT, RIGHT TIME, RIGHT LINE

## 2022-08-22 NOTE — PROGRESS NOTES
Pulmonary & Critical Care Medicine ICU Progress Note    CC: Acute respiratory failure    Events of Last 24 hours:   Still lethargic and mostly non-verbal  Bradycardia while asleep  Requiring high flow oxygen    Invasive Lines: 8/17/2022 right femoral CVC in ER    MV: 8/17/2022-8/20/22    IV:   dextrose      dextrose 5 % and 0.45 % NaCl 60 mL/hr at 08/22/22 0540    sodium chloride         Vitals:  Blood pressure (!) 113/52, pulse 57, temperature 97.5 °F (36.4 °C), temperature source Bladder, resp. rate 15, height 5' 6\" (1.676 m), weight 132 lb 4.8 oz (60 kg), SpO2 94 %. on 9L   Constitutional:  No acute distress. Eyes: PERRL. Conjunctivae anicteric. ENT: Normal nose. Normal tongue. Neck:  Trachea is midline. No thyroid tenderness. Respiratory: No accessory muscle usage. No wheezes. No rales. No Rhonchi. Cardiovascular: Normal S1S2. No digit clubbing. No digit cyanosis. No LE edema. Gastrointestinal: No mass palpated. No tenderness palpated. Skin: No rash on the exposed extremities. No Nodules or induration on exposed extremities. Psychiatric: Lethargic and not following commands.       Scheduled Meds:   hydrocortisone sodium succinate PF  25 mg IntraVENous Q12H    fondaparinux  7.5 mg SubCUTAneous Daily    vancomycin  750 mg IntraVENous Q12H    cefTRIAXone (ROCEPHIN) IV  2,000 mg IntraVENous Q24H    Venelex   Topical BID    sodium chloride flush  5-40 mL IntraVENous 2 times per day    insulin lispro  0-4 Units SubCUTAneous Q4H    mupirocin   Nasal BID     PRN Meds:  glucose, dextrose bolus **OR** dextrose bolus, glucagon (rDNA), dextrose, ipratropium-albuterol, sodium chloride flush, sodium chloride, ondansetron **OR** ondansetron, polyethylene glycol, acetaminophen **OR** acetaminophen    Results:  CBC:   Recent Labs     08/20/22  0555 08/21/22  0431 08/22/22  0502   WBC 12.1* 6.7 6.9   HGB 10.7* 11.0* 10.2*   HCT 32.9* 32.6* 31.2*   MCV 95.4 94.9 96.4   * 99* 108*       BMP:   Recent Labs 22  0555 22  0431 22  0502 22  0628    145 136  --    K 4.6 4.4  --  4.8    107 104  --    CO2 26 29 25  --    BUN 56* 36* 26*  --    CREATININE 0.7 <0.5* <0.5*  --        LIVER PROFILE:   Recent Labs     22  0555 22  0431 22  0502 22  0628   AST 29 90*  --  56*   ALT 18 64*  --  54*   BILITOT 0.4 0.6 0.5  --    ALKPHOS 129 148* 174*  --        Cultures:  2022 SARS-CoV-2 and influenza are negative  2022 blood sent  2022 streptococcal pneumonia antigen is positive  2022 tracheal aspirate MRSA (LEATHA 1) and streptococcal pneumonia;      Imagin2022 C-spine no acute abnormality  2022 chest CT left greater than right lower lobe predominant airspace disease  2022 abdominal CT gallbladder distention, cystic pancreatic lesions, atherosclerotic disease of aorta    2022 CXR left-sided airspace disease    ECHO 19 with EF 60-65%, Grade I DD     ASSESSMENT:  Acute hypoxemic respiratory failure   Septic shock  Acute kidney failure has resolved  A. fib RVR, has converted to sinus bradycardia  Streptococcal pneumoniae pneumonia, respiratory culture also with MRSA  Dementia of the Alzheimer's type  Chronic nursing home resident  Diabetes  Low voltage EKG  Acute transaminitis  Thrombocytopenia      PLAN:  High flow nasal oxygen for life threatening respiratory failure   Antibiotic D#6, CTX/Vanc  Taper hydrocortisone to 25mg daily  Blood sugar control, with goal 140-180   Arixtra until able to give DOAC, and pending  HIT panel   Prophylaxis: Bactroban  DNR CCA, family does want reintubation if needed  Change to LR at 50cc/hr  Close monitoring in ICU, high risk for re-intubation   Total critical care time caring for this patient with life threatening, unstable organ failure, including direct patient contact, management of life support systems, review of data including imaging and labs, discussions with other team members and

## 2022-08-22 NOTE — PROGRESS NOTES
ICU Progress Note    Admit Date:  8/17/2022  5    Interval history:    Admitted with pneumonia and respiratory failure, s/p mechanical ventilation. Extubated on 8/20-on high flow oxygen. A. fib RVR, placed on heparin drip-> discontinued due to thrombocytopenia  Hypotensive on Levophed-> weaned off of Levophed on 8/21      Subjective:  Ms. Galindo Diggsibb seen. She is doing about the same. She is awake, confused, not oriented. Constantly moaning and mumbling. She has advanced dementia. on high flow oxygen 10 L-> 6L   Persistent increased respiratory secretions requiring frequent suctioning. NPO, swallowing eval could not be completed due to patient's mental status  Limited code; family does want reintubation if respiratory status worsens  Blood pressure stable remains off of pressors. Thrombocytopenia- Rule Out HIT , Heparin stopped and started on Arixtra     Objective:   BP 89/72   Pulse 57   Temp 97.1 °F (36.2 °C) (Bladder)   Resp 16   Ht 5' 6\" (1.676 m)   Wt 132 lb 4.8 oz (60 kg)   SpO2 91%   BMI 21.35 kg/m²     Intake/Output Summary (Last 24 hours) at 8/22/2022 1438  Last data filed at 8/22/2022 0540  Gross per 24 hour   Intake 1148 ml   Output 425 ml   Net 723 ml         Physical Exam:    General: elderly female,   Awake , responding a little , confused  . Mumbling  Appears to be not in any distress  Mucous Membranes:  Pink , anicteric  Neck: No JVD, no carotid bruit, no thyromegaly  Chest: Diminished breath sounds with scattered crackles  Cardiovascular:  RRR S1S2 heard, no murmurs or gallops  Abdomen:  Soft, undistended, non tender, no organomegaly, BS present  Extremities: right femoral TLC   No edema or cyanosis.  Distal pulses well felt  Neurological : Nonfocal       Medications:   Scheduled Medications:    [START ON 8/23/2022] hydrocortisone sodium succinate PF  25 mg IntraVENous Q24H    fondaparinux  7.5 mg SubCUTAneous Daily    vancomycin  750 mg IntraVENous Q12H    cefTRIAXone (ROCEPHIN) IV 2,000 mg IntraVENous Q24H    Venelex   Topical BID    sodium chloride flush  5-40 mL IntraVENous 2 times per day    insulin lispro  0-4 Units SubCUTAneous Q4H     I   dextrose      lactated ringers 50 mL/hr at 08/22/22 1105    sodium chloride       glucose, dextrose bolus **OR** dextrose bolus, glucagon (rDNA), dextrose, ipratropium-albuterol, sodium chloride flush, sodium chloride, ondansetron **OR** ondansetron, polyethylene glycol, acetaminophen **OR** acetaminophen    Lab Data:  Recent Labs     08/20/22  0555 08/21/22  0431 08/22/22  0502   WBC 12.1* 6.7 6.9   HGB 10.7* 11.0* 10.2*   HCT 32.9* 32.6* 31.2*   MCV 95.4 94.9 96.4   * 99* 108*       Recent Labs     08/20/22  0555 08/21/22  0431 08/22/22  0502 08/22/22  0628    145 136  --    K 4.6 4.4  --  4.8    107 104  --    CO2 26 29 25  --    BUN 56* 36* 26*  --    CREATININE 0.7 <0.5* <0.5*  --        No results for input(s): CKTOTAL, CKMB, CKMBINDEX, TROPONINI in the last 72 hours. Coagulation:   Lab Results   Component Value Date/Time    INR 1.24 08/17/2022 12:10 PM    APTT 24.1 08/17/2022 02:52 PM     Cardiac markers:   Lab Results   Component Value Date/Time    CKTOTAL 38 03/21/2017 11:23 AM    TROPONINI 0.02 08/18/2022 03:39 AM         Lab Results   Component Value Date    ALT 54 (H) 08/22/2022    AST 56 (H) 08/22/2022    ALKPHOS 174 (H) 08/22/2022    BILITOT 0.5 08/22/2022       Lab Results   Component Value Date    INR 1.24 (H) 08/17/2022    INR 1.06 09/05/2017    INR 1.13 04/19/2017    PROTIME 15.5 (H) 08/17/2022    PROTIME 12.3 09/05/2017    PROTIME 12.8 04/19/2017       Cultures  Covid not detected  Resp cx strep pneumonia staph aureus  Strep pneumoniae - pneumococcal pneumonia     Radiology:  XR CHEST PORTABLE   Final Result   Patchy infiltrates seen within the lungs bilaterally which may represent   multifocal pneumonia.          XR CHEST PORTABLE   Final Result   Slightly improving aeration of the lungs in this intubated Risk   Factors  Component Value   C CHF No 0   H HTN No 0   A2 Age >= 76 Yes,  (80 y.o.) 2   D DM Yes 1   S2 Prior Stroke/TIA No 0   V Vascular Disease No 0   A Age 74-69 No,  (80 y.o.) 0   Sc Sex female 1    [id number]  Score  4        ASSESMENT & PLAN:      Pneumococcal pneumonia   - imaging with Multifocal pneumonia- cx with strep pneumo   - started on cefepime and vanc- downgraded to rocephin with cx   - blood cx remain neg   -S/p mechanical ventilation  - Continue current antibiotics, day 5 of Rocephin  Seen by pulmonary critical care    Acute hypoxemic resp failure  - sec to pneumonia  - started on vent support by EMS at field  - vent mx per pulmonary  S/p mechanical ventilation, patient extubated 8/20 . Remains on high flow oxygen, 10 L-> 6L ;  wean O2 as tolerated. Patient is limited code, but family requesting reintubation if needed    Sepsis- severe with shock  - hypotensive despite IVF boluses ; was requiring pressors Levophed and vasopressin . Off pressors now . - continue IVF  - added stress dose steroids     Afibb with RVR   - converted to NSR with amio and digoxin  - Cardiology consulted  -TSH wnl  - [id number] Score of 4; anticoagulation indicated. -Patient started on heparin drip, per cardiology recommendations to switch to NOAC when appropriate and set up a cardiac monitor at discharge.  -> Thrombocytopenia today, rule out HIT. Heparin drip stopped and patient was started on Arixtra for anticoagulation    Thrombocytopenia  - HIT antibody pending  -Stop heparin.   -Monitor platelet count    DM 2  - holding home meds, currently on ssi     Acute renal failure  - sec to sepsis- likely ATN  - improved with IVF, continue IVF     Advanced dementia  -Limited code  But family does want reintubation if needed ; no CPR no defib      Diet: npo , on  TF . Attempt at swallowing eval in a.m.   DVT PX:  arixtra  CODE STATUS: limited     Lynn Soto MD, 8/22/2022 2:38 PM

## 2022-08-22 NOTE — PROGRESS NOTES
Comprehensive Nutrition Assessment    Type and Reason for Visit:  Reassess    Nutrition Recommendations/Plan:   Continue NPO status until patient is medically cleared to receive nutrition therapy. Monitor nutrition plan of care and goals of care. Monitor nutrition-related labs, bowel function, and weight trends. Malnutrition Assessment:  Malnutrition Status:   Moderate malnutrition (08/22/22 1532)    Context:  Acute Illness     Findings of the 6 clinical characteristics of malnutrition:  Energy Intake:  50% or less of estimated energy requirements for 5 or more days  Weight Loss:  No significant weight loss     Body Fat Loss:  Mild body fat loss Orbital, Buccal region   Muscle Mass Loss:  Mild muscle mass loss Clavicles (pectoralis & deltoids), Temples (temporalis), Hand (interosseous), Thigh (quadraceps), Calf (gastrocnemius)  Fluid Accumulation:  No significant fluid accumulation     Strength:  Not Performed    Nutrition Assessment:    patient is declining from a nutritional standpoint AEB NPO status x 5 days admission and inability to have a swallow evaluation completed safely at this time r/t advanced dementia and she remains at risk for further compromise d/t NPO status, altered nutrition-related labs, and DTI on sacrum; will continue NPO status and monitor plan of care    Nutrition Related Findings:    patient is awake this afternoon but patient is not oriented and speech is jumbled for the most part; patient did say some words but her thoughts don't all make sense; + BM today; + DTI on sacrum; LR at 50 ml/hr ordered at this time; on low-dose SSI; hx of advanced dementia; blood glucose trends are elevated; patient's R hand is swollen; + bruising on BUE; toenails are thick and long Wound Type: Deep Tissue Injury (DTI on sacrum reported)       Current Nutrition Intake & Therapies:    Average Meal Intake: NPO  Average Supplements Intake: NPO  Diet NPO    Anthropometric Measures:  Height: 5' 6\" (167.6 cm)  Ideal Body Weight (IBW): 130 lbs (59 kg)    Admission Body Weight: 131 lb (59.4 kg) (obtained on 8/17/22; actual weight)  Current Body Weight: 132 lb 4.8 oz (60 kg) (obtained on 8/22/22; actual weight), 101.8 % IBW.  Weight Source: Bed Scale  Current BMI (kg/m2): 21.4  Usual Body Weight: 131 lb (59.4 kg) (obtained on 8/17/22; actual weight)  % Weight Change (Calculated): 1  Weight Adjustment For: No Adjustment                 BMI Categories: Underweight (BMI less than 22) age over 72    Estimated Daily Nutrient Needs:  Energy Requirements Based On: Kcal/kg  Weight Used for Energy Requirements: Current  Energy (kcal/day): 1334 - 1450 kcals based on 23-25 kcals/kg/CBW  Weight Used for Protein Requirements: Current  Protein (g/day): 81 - 93 g protein based on 1.4-1.6 g/kg/CBW  Method Used for Fluid Requirements: 1 ml/kcal  Fluid (ml/day): 1334 - 1450 ml    Nutrition Diagnosis:   Inadequate oral intake related to inadequate protein-energy intake, impaired respiratory function, cognitive or neurological impairment as evidenced by NPO or clear liquid status due to medical condition, poor intake prior to admission, wounds, mild loss of subcutaneous fat, mild muscle loss, lab values    Nutrition Interventions:   Food and/or Nutrient Delivery: Continue NPO  Nutrition Education/Counseling: No recommendation at this time  Coordination of Nutrition Care: Continue to monitor while inpatient, Interdisciplinary Rounds  Plan of Care discussed with: RN    Goals:  Previous Goal Met: Goal(s) Achieved  Goals: other (specify)  Specify Other Goals: patient will remain in NPO status until medically cleared to receive nutrition therapy    Nutrition Monitoring and Evaluation:   Behavioral-Environmental Outcomes: None Identified  Food/Nutrient Intake Outcomes: Diet Advancement/Tolerance, IVF Intake  Physical Signs/Symptoms Outcomes: Biochemical Data, Chewing or Swallowing, Hemodynamic Status, Nutrition Focused Physical Findings, Skin, Weight    Discharge Planning:     Too soon to determine     Faheem Arellano, [address] [place] [address], LD  Contact: [phone]

## 2022-08-22 NOTE — CARE COORDINATION
INTERDISCIPLINARY PLAN OF CARE CONFERENCE    Date/Time: 8/22/2022 9:24 AM  Completed by: Fanny Dan, Case Management      Patient Name:  Myesha Gaona  YOB: 1940  Admitting Diagnosis: Multifocal pneumonia [J18.9]     Admit Date/Time:  8/17/2022 12:16 PM    Chart reviewed. Interdisciplinary team contacted or reviewed plan related to patient progress and discharge plans. Disciplines included Case Management, Nursing, and Dietitian. Current Status:ongoing   PT/OT recommendation for discharge plan of care: n/a    Expected D/C Disposition: [address] [place] [address] (OV)    Discharge Plan Comments: Chart review completed. Completed Interdisciplinary rounds with ICU staff. Pt is long term care at [facility] [address] and the plan is for pt return per past conversations with pt's family. No visitors at bedside    CM will continue to follow and assist. Please notify CM if needs or concerns arise.     Home O2 in place on admit: per facility

## 2022-08-22 NOTE — PROGRESS NOTES
Speech Language Pathology  Clinical Bedside Swallow Assessment  Facility/Department: [facility] ICU    Instrumentation: MBS or FEES warranted, however, I would prefer her mentation be improved. If this is her baseline, MD could consider FEES prior to PO advancement. Pt may also benefit from temporary alternate means of nutrition if this aligns with goals of care. Diet recommendation: NPO; NPO; Meds via alt means of nutrition  Risk management: Control risk factors for aspiration PNA by completing oral care 3-4x/day and increasing physical mobility as is medically feasible    NAME:Shilpa Reece  : 1940 (80 y.o.)   MRN: [id number]  ROOM: [address] [place] [address]  ADMISSION DATE: 2022  PATIENT DIAGNOSIS(ES): Multifocal pneumonia [J18.9]  Chief Complaint   Patient presents with    Respiratory Distress     EMS states that pt is usually confused. EMS states that pts O2 sat were in the 70s'. EMS administered ketamine and rocc.  Pt is intubated      Patient Active Problem List    Diagnosis Date Noted    Atrial fibrillation with RVR ([address] 2022    NEELIMA (acute kidney injury) ([address] 2022    Acute respiratory failure with hypoxia ([address] 2022    Moderate protein-calorie malnutrition ([address] 2022    Acute renal failure with acute cortical necrosis (HCC) 2022    Multifocal pneumonia 2022    Acute hypoxemic respiratory failure ([address] 2022    Septic shock ([address] 2022    Primary insomnia 10/16/2019    Cerebral microvasculopathy 2018    Osteoarthritis of right AC (acromioclavicular) joint 2018    Shoulder impingement, right 2018    ROMERO (nonalcoholic steatohepatitis) 2017    Chronic migraine without aura without status migrainosus, not intractable 2016    Late onset Alzheimer's disease without behavioral disturbance ([address] 2016    Arthritis 2016    Type 2 diabetes mellitus without complication, without long-term current use of insulin ([address] is a 80 y.o.  female  With known h.o  HTN, DM, CVA, dementia living in a NH presented with increasing resp distress by EMS. Pt was noted to be septic, hypotensive, given IVF started on levophed , was placed on vent support by EMS and brought to Er . She developed Afibb with RVR, started on amiodarone, digoxin and admitted to ICU with vent support . Critical care consulted     No other info available . Pt seen sedated on vent with no distress\"    Patient Complaints: Pt grossly non-verbal and unable to answer questions re: odynophagia, globus sensation, weight loss, etc.    Baseline Method of Oral Meds:  [] Whole with liquid    [] Cut with liquid    [] Whole with puree    [] Cut in puree    [] Crushed in puree    [] Crushed in liquid    [] Via TF  [x] Unknown    Additional Reported Symptoms/Complaints: Intubated from 08/17-08/20. No hx of speech in chart. Admitted with \"likely aspirational multifocal PNA\", acute hypoxemic resp failure, sepsis w/severe shock, afibb with RVR, and DM 2. Pt has been noted with reduced PAWAN since at least yesterday. Today the pt is alert with sternal rub. Eyes open and tracking, however, grossly non-verbal. RN reports pt has had limited verbalizations.       Predisposing dysphagia risk factors: Age  Clinical signs of possible chronic dysphagia: N/A  Precipitating dysphagia risk factors: reduced physical mobility, increased O2 demands, AMS, and recent intubation    Vitals/labs:     Vitals:    08/22/22 0500 08/22/22 0600 08/22/22 0700 08/22/22 0807   BP: (!) 113/92 (!) 129/103 (!) 113/52    Pulse: 60 56 57    Resp: 13 16 15    Temp:   97 °F (36.1 °C)    TempSrc:   Bladder    SpO2: 97% 93% 94% 97%   Weight:       Height:            CBC:   Recent Labs     08/22/22  0502   WBC 6.9   HGB 10.2*   *      BMP:  Recent Labs     08/22/22  0502 08/22/22  0628     --    K  --  4.8     --    CO2 25  --    BUN 26*  --    CREATININE <0.5*  --    GLUCOSE 315*  --           Cranial may also benefit from temporary alternate means of nutrition if this aligns with goals of care.   Diet recommendation: NPO; NPO; Meds via alt means of nutrition  Risk management: Control risk factors for aspiration PNA by completing oral care 3-4x/day and increasing physical mobility as is medically feasible      Prognosis: Guarded    Recommended Intervention:  [x] Dysphagia tx  [] Videostroboscopy                      [x] NPO   [x] MBS       [] Speech/Cog Eval  [] Therapeutic PO Trials     [] Ice Chips   [] Other:  [x] FEES                                                 Dysphagia Therapeutic Intervention:  []  Bolus control Exercises  []  Oral Motor Exercises  []  Kang Water Protocol  []  Thermal Stimulation  []  Oral Care     []  Vital Stim/NMES  []  Laryngeal Exercises  [x]  Patient/Family Education  []  Pharyngeal Exercises  [x]  Therapeutic PO trials with SLP  [x]  Diet tolerance monitoring  []  Other:     Referrals:  [] ENT    [] PT  [x] Pulmonology [] GI  [] Neurology  [] RD  [] OT   []     Goals:  Short Term Goals:  Timeframe for Short Term Goals: (5 days 08/28/2022)  Goal 1: The patient will tolerate repeat BSE when able for ongoing assessment  Goal 2: The patient will tolerate instrumental assessment when able   Goal 3: The patient will tolerate recommended diet with no clinical s/s of aspiration 5/5  Goal 4: The patient will tolerate therapeutic diet upgrade trials with no clinical s/s of aspiration 5/5    Long Term Goals:   Timeframe for Long Term Goals: (7 days 08/30/2022)  Goal 1: The patient will tolerate least restrictive diet with no clinical s/s of aspiration or worsening respiratory/pulmonary status    Treatment:  Skilled instruction completed with patient re: evidenced based practice regarding recommendations and POC, importance of oral care to reduce adverse affects in the event of aspiration, and instruction of recommended compensatory strategies developed based upon clinical exam. Pt not able to recall/demonstrate compensatory strategies with max cues. Role of SLP, rationale for completion of assessment, anatomical components of swallow structures as they pertain to airway protection, results of assessment, recommendations, and POC  Pt Education: SLP educated the patient re: Role of SLP, rationale for completion of assessment, anatomical components of swallow structures as they pertain to airway protection, results of assessment, recommendations, and POC  Pt Education Response: no evidence of learning, would benefit from ongoing education, and RN aware    Duration/Frequency of Tx: 3-5x/wk    Individuals Consulted:   [x]  Patient     []  NP         [x]  RN   []  RD                   []  MD      []  Family Member                        []  PA    []  Other:      Safety Devices / Report:  [x]  All fall risk precautions in place [x]  Safety handoff completed with RN  [x]  Bed alarm in place  [x]  Left in bed     []  Chair alarm in place  []  Left in chair   [x]  Call light in reach   []  Other:                        Total Treatment Time / Charges       Time in Time out Total Time / units   Swallow Eval/Tx Time  0830 0856 26 mins / 2 units     Signature:  Sandra Busby M.A., [address] [facility] #[id number]  Speech-Language Pathologist  Phone: [phone], Desk- [phone]  Hours: [phone]

## 2022-08-22 NOTE — PROGRESS NOTES
Dr. Chey Bush at bedside    -change fluids to [facility]  -needs SLP when appropriate    Jose Valenzuela RN, BSN

## 2022-08-23 LAB
A/G RATIO: 0.8 (ref 1.1–2.2)
ALBUMIN SERPL-MCNC: 2 G/DL (ref 3.4–5)
ALP BLD-CCNC: 190 U/L (ref 40–129)
ALT SERPL-CCNC: 53 U/L (ref 10–40)
ANION GAP SERPL CALCULATED.3IONS-SCNC: 6 MMOL/L (ref 3–16)
AST SERPL-CCNC: 43 U/L (ref 15–37)
BILIRUB SERPL-MCNC: 0.5 MG/DL (ref 0–1)
BUN BLDV-MCNC: 19 MG/DL (ref 7–20)
CALCIUM SERPL-MCNC: 8.4 MG/DL (ref 8.3–10.6)
CHLORIDE BLD-SCNC: 104 MMOL/L (ref 99–110)
CO2: 28 MMOL/L (ref 21–32)
CREAT SERPL-MCNC: <0.5 MG/DL (ref 0.6–1.2)
GFR AFRICAN AMERICAN: >60
GFR NON-AFRICAN AMERICAN: >60
GLUCOSE BLD-MCNC: 107 MG/DL (ref 70–99)
GLUCOSE BLD-MCNC: 109 MG/DL (ref 70–99)
GLUCOSE BLD-MCNC: 110 MG/DL (ref 70–99)
GLUCOSE BLD-MCNC: 118 MG/DL (ref 70–99)
GLUCOSE BLD-MCNC: 20 MG/DL (ref 70–99)
GLUCOSE BLD-MCNC: 84 MG/DL (ref 70–99)
GLUCOSE BLD-MCNC: 85 MG/DL (ref 70–99)
HCT VFR BLD CALC: 30.2 % (ref 36–48)
HEMOGLOBIN: 10.1 G/DL (ref 12–16)
MAGNESIUM: 1.9 MG/DL (ref 1.8–2.4)
MCH RBC QN AUTO: 32 PG (ref 26–34)
MCHC RBC AUTO-ENTMCNC: 33.5 G/DL (ref 31–36)
MCV RBC AUTO: 95.7 FL (ref 80–100)
PDW BLD-RTO: 17.2 % (ref 12.4–15.4)
PERFORMED ON: ABNORMAL
PERFORMED ON: NORMAL
PERFORMED ON: NORMAL
PLATELET # BLD: 185 K/UL (ref 135–450)
PMV BLD AUTO: 7.7 FL (ref 5–10.5)
POTASSIUM REFLEX MAGNESIUM: 3.4 MMOL/L (ref 3.5–5.1)
RBC # BLD: 3.16 M/UL (ref 4–5.2)
SODIUM BLD-SCNC: 138 MMOL/L (ref 136–145)
TOTAL PROTEIN: 4.6 G/DL (ref 6.4–8.2)
WBC # BLD: 8 K/UL (ref 4–11)

## 2022-08-23 PROCEDURE — 94761 N-INVAS EAR/PLS OXIMETRY MLT: CPT

## 2022-08-23 PROCEDURE — 2580000003 HC RX 258: Performed by: INTERNAL MEDICINE

## 2022-08-23 PROCEDURE — 92526 ORAL FUNCTION THERAPY: CPT

## 2022-08-23 PROCEDURE — 2580000003 HC RX 258: Performed by: NURSE PRACTITIONER

## 2022-08-23 PROCEDURE — 6360000002 HC RX W HCPCS: Performed by: INTERNAL MEDICINE

## 2022-08-23 PROCEDURE — 80053 COMPREHEN METABOLIC PANEL: CPT

## 2022-08-23 PROCEDURE — 99233 SBSQ HOSP IP/OBS HIGH 50: CPT | Performed by: INTERNAL MEDICINE

## 2022-08-23 PROCEDURE — 2700000000 HC OXYGEN THERAPY PER DAY

## 2022-08-23 PROCEDURE — 2000000000 HC ICU R&B

## 2022-08-23 PROCEDURE — 92610 EVALUATE SWALLOWING FUNCTION: CPT

## 2022-08-23 PROCEDURE — 83735 ASSAY OF MAGNESIUM: CPT

## 2022-08-23 PROCEDURE — 85027 COMPLETE CBC AUTOMATED: CPT

## 2022-08-23 RX ORDER — DEXTROSE, SODIUM CHLORIDE, SODIUM LACTATE, POTASSIUM CHLORIDE, AND CALCIUM CHLORIDE 5; .6; .31; .03; .02 G/100ML; G/100ML; G/100ML; G/100ML; G/100ML
INJECTION, SOLUTION INTRAVENOUS CONTINUOUS
Status: DISCONTINUED | OUTPATIENT
Start: 2022-08-23 | End: 2022-08-26

## 2022-08-23 RX ADMIN — HYDROCORTISONE SODIUM SUCCINATE 25 MG: 100 INJECTION, POWDER, FOR SOLUTION INTRAMUSCULAR; INTRAVENOUS at 07:36

## 2022-08-23 RX ADMIN — CEFTRIAXONE 2000 MG: 2 INJECTION, POWDER, FOR SOLUTION INTRAMUSCULAR; INTRAVENOUS at 10:15

## 2022-08-23 RX ADMIN — FONDAPARINUX SODIUM 7.5 MG: 7.5 INJECTION, SOLUTION SUBCUTANEOUS at 07:36

## 2022-08-23 RX ADMIN — Medication 10 ML: at 20:28

## 2022-08-23 RX ADMIN — SODIUM CHLORIDE, SODIUM LACTATE, POTASSIUM CHLORIDE, CALCIUM CHLORIDE AND DEXTROSE MONOHYDRATE: 5; 600; 310; 30; 20 INJECTION, SOLUTION INTRAVENOUS at 10:11

## 2022-08-23 RX ADMIN — VANCOMYCIN HYDROCHLORIDE 750 MG: 750 INJECTION, POWDER, LYOPHILIZED, FOR SOLUTION INTRAVENOUS at 11:54

## 2022-08-23 RX ADMIN — Medication: at 20:28

## 2022-08-23 RX ADMIN — Medication: at 07:36

## 2022-08-23 RX ADMIN — Medication 10 ML: at 07:36

## 2022-08-23 RX ADMIN — VANCOMYCIN HYDROCHLORIDE 750 MG: 750 INJECTION, POWDER, LYOPHILIZED, FOR SOLUTION INTRAVENOUS at 23:07

## 2022-08-23 NOTE — PROGRESS NOTES
Pulmonary & Critical Care Medicine ICU Progress Note    CC: Acute respiratory failure    Events of Last 24 hours:   Still lethargic and mostly non-verbal  Hypoglycemia this morning  Asymptomatic bradycardia when sleeping    Invasive Lines: 8/17/2022 right femoral CVC in ER    MV: 8/17/2022-8/20/22    IV:   dextrose      lactated ringers 50 mL/hr at 08/22/22 2242    sodium chloride         Vitals:  Blood pressure 113/60, pulse 58, temperature 96.8 °F (36 °C), temperature source Bladder, resp. rate (!) 31, height 5' 6\" (1.676 m), weight 132 lb 4.8 oz (60 kg), SpO2 93 %. on 7L   Constitutional:  No acute distress. Eyes: PERRL. Conjunctivae anicteric. ENT: Normal nose. Normal tongue. Neck:  Trachea is midline. No thyroid tenderness. Respiratory: No accessory muscle usage. No wheezes. No rales. No Rhonchi. Cardiovascular: Normal S1S2. No digit clubbing. No digit cyanosis. No LE edema. Gastrointestinal: No mass palpated. No tenderness palpated. Skin: No rash on the exposed extremities. No Nodules or induration on exposed extremities. Psychiatric: Lethargic and not following commands.       Scheduled Meds:   hydrocortisone sodium succinate PF  25 mg IntraVENous Q24H    fondaparinux  7.5 mg SubCUTAneous Daily    vancomycin  750 mg IntraVENous Q12H    cefTRIAXone (ROCEPHIN) IV  2,000 mg IntraVENous Q24H    Venelex   Topical BID    sodium chloride flush  5-40 mL IntraVENous 2 times per day    insulin lispro  0-4 Units SubCUTAneous Q4H     PRN Meds:  glucose, dextrose bolus **OR** dextrose bolus, glucagon (rDNA), dextrose, ipratropium-albuterol, sodium chloride flush, sodium chloride, ondansetron **OR** ondansetron, polyethylene glycol, acetaminophen **OR** acetaminophen    Results:  CBC:   Recent Labs     08/21/22  0431 08/22/22  0502 08/23/22  0656   WBC 6.7 6.9 8.0   HGB 11.0* 10.2* 10.1*   HCT 32.6* 31.2* 30.2*   MCV 94.9 96.4 95.7   PLT 99* 108* 185       BMP:   Recent Labs     08/21/22  0431 08/22/22  0502 22  0628 22  0656    136  --  138   K 4.4  --  4.8 3.4*    104  --  104   CO2 29 25  --  28   BUN 36* 26*  --  19   CREATININE <0.5* <0.5*  --  <0.5*       LIVER PROFILE:   Recent Labs     22  0431 22  0502 22  0628 22  0656   AST 90*  --  56* 43*   ALT 64*  --  54* 53*   BILITOT 0.6 0.5  --  0.5   ALKPHOS 148* 174*  --  190*       Cultures:  2022 SARS-CoV-2 and influenza are negative  2022 blood sent  2022 streptococcal pneumonia antigen is positive  2022 tracheal aspirate MRSA (LEATHA 1) and streptococcal pneumonia;      Imagin2022 C-spine no acute abnormality  2022 chest CT left greater than right lower lobe predominant airspace disease  2022 abdominal CT gallbladder distention, cystic pancreatic lesions, atherosclerotic disease of aorta    2022 CXR left-sided airspace disease    ECHO 19 with EF 60-65%, Grade I DD     ASSESSMENT:  Acute hypoxemic respiratory failure   Septic shock  Acute kidney failure has resolved  A. fib RVR, has converted to sinus bradycardia  Streptococcal pneumoniae pneumonia, respiratory culture also with MRSA  Dementia of the Alzheimer's type  Chronic nursing home resident  Diabetes  Mild transaminitis -improved some  Thrombocytopenia      PLAN:  Supplemental oxygen to maintain SaO2 >92%; wean as tolerated    Antibiotic D#7/, CTX/Vanc  Taper hydrocortisone off today  Blood sugar control, with goal 140-180   Arixtra until able to give DOAC  MICHAEL pending with + HIT    Prophylaxis: Bactroban  SLP when more alert  Remove CVC if 2 PIV obtained  DNR CCA, family does want reintubation if needed  MIVF LR at 50cc/hr to D5 LR  Ok to transfer to PCU

## 2022-08-23 NOTE — PLAN OF CARE
Problem: SLP Adult - Impaired Swallowing  Goal: By Discharge: Advance to least restrictive diet without signs or symptoms of aspiration for planned discharge setting. See evaluation for individualized goals. Note: SLP completed evaluation. Please refer to notes in EMR.     Milly Marin M.A., [address] [place] [address] #[id number]  Speech-Language Pathologist  Phone: Uoecmqti- [phone], [place]- [phone]  Hours: M-F [phone]

## 2022-08-23 NOTE — PROGRESS NOTES
ICU Progress Note    Admit Date:  8/17/2022  6    Interval history:    Admitted with pneumonia and respiratory failure, s/p mechanical ventilation. Extubated on 8/20-on high flow oxygen. A. fib RVR, placed on heparin drip-> discontinued due to thrombocytopenia  Hypotensive on Levophed-> weaned off of Levophed on 8/21      Subjective:  Ms. Samantha Presley seen. She is doing about the same. She is awake, confused, not oriented. Constantly moaning and mumbling. She has advanced dementia. Remains on high flow oxygen, 9 L. Extensive bradycardia noted yesterday. Persistent increased respiratory secretions requiring frequent suctioning. NPO, swallowing eval could not be completed due to patient's mental status  Limited code; family does want reintubation if respiratory status worsens  Blood pressure stable remains off of pressors. 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 Objective:   /68   Pulse 74   Temp 96.8 °F (36 °C) (Bladder)   Resp 14   Ht 5' 6\" (1.676 m)   Wt 132 lb 4.8 oz (60 kg)   SpO2 93%   BMI 21.35 kg/m²     Intake/Output Summary (Last 24 hours) at 8/23/2022 1514  Last data filed at 8/23/2022 1313  Gross per 24 hour   Intake 1810.33 ml   Output 750 ml   Net 1060.33 ml         Physical Exam:    General: elderly female,   Awake , responding a little , confused  . Mumbling  Appears to be not in any distress  Mucous Membranes:  Pink , anicteric  Neck: No JVD, no carotid bruit, no thyromegaly  Chest: Diminished breath sounds with scattered crackles  Cardiovascular:  RRR S1S2 heard, no murmurs or gallops  Abdomen:  Soft, undistended, non tender, no organomegaly, BS present  Extremities: right femoral TLC   No edema or cyanosis.  Distal pulses well felt  Neurological : Nonfocal       Medications:   Scheduled Medications:    fondaparinux  7.5 mg SubCUTAneous Daily    vancomycin  750 mg IntraVENous Q12H    cefTRIAXone (ROCEPHIN) IV 2,000 mg IntraVENous Q24H    Venelex   Topical BID    sodium chloride flush  5-40 mL IntraVENous 2 times per day    insulin lispro  0-4 Units SubCUTAneous Q4H     I   dextrose 5% in lactated ringers 50 mL/hr at 08/23/22 1011    dextrose      sodium chloride       glucose, dextrose bolus **OR** dextrose bolus, glucagon (rDNA), dextrose, ipratropium-albuterol, sodium chloride flush, sodium chloride, ondansetron **OR** ondansetron, polyethylene glycol, acetaminophen **OR** acetaminophen    Lab Data:  Recent Labs     08/21/22  0431 08/22/22  0502 08/23/22  0656   WBC 6.7 6.9 8.0   HGB 11.0* 10.2* 10.1*   HCT 32.6* 31.2* 30.2*   MCV 94.9 96.4 95.7   PLT 99* 108* 185       Recent Labs     08/21/22  0431 08/22/22  0502 08/22/22  0628 08/23/22  0656    136  --  138   K 4.4  --  4.8 3.4*    104  --  104   CO2 29 25  --  28   BUN 36* 26*  --  19   CREATININE <0.5* <0.5*  --  <0.5*       No results for input(s): CKTOTAL, CKMB, CKMBINDEX, TROPONINI in the last 72 hours. Coagulation:   Lab Results   Component Value Date/Time    INR 1.24 08/17/2022 12:10 PM    APTT 24.1 08/17/2022 02:52 PM     Cardiac markers:   Lab Results   Component Value Date/Time    CKTOTAL 38 03/21/2017 11:23 AM    TROPONINI 0.02 08/18/2022 03:39 AM         Lab Results   Component Value Date    ALT 53 (H) 08/23/2022    AST 43 (H) 08/23/2022    ALKPHOS 190 (H) 08/23/2022    BILITOT 0.5 08/23/2022       Lab Results   Component Value Date    INR 1.24 (H) 08/17/2022    INR 1.06 09/05/2017    INR 1.13 04/19/2017    PROTIME 15.5 (H) 08/17/2022    PROTIME 12.3 09/05/2017    PROTIME 12.8 04/19/2017       Cultures  Covid not detected  Resp cx strep pneumonia staph aureus  Strep pneumoniae - pneumococcal pneumonia     Radiology:  XR CHEST PORTABLE   Final Result   Patchy infiltrates seen within the lungs bilaterally which may represent   multifocal pneumonia.          XR CHEST PORTABLE   Final Result   Slightly improving aeration of the lungs in this intubated patient. XR CHEST PORTABLE   Final Result   No significant interval change in moderate left-sided airspace disease as   compared to prior. Trace bilateral pleural effusions or pleural thickening. CT ABDOMEN PELVIS W IV CONTRAST Additional Contrast? None   Final Result   1. No pulmonary embolism. 2. Multifocal airspace opacities most prominent in the left lower lobe. Pattern likely represents developing pneumonia. ARDS may be considered. 3. Distended gallbladder with no pattern of inflammation or biliary   dilatation. This may be due to fasting status or other systemic processes   described above. 4. Multiple cystic lesions within the pancreas that have developed or are   increased from a prior study in 2016. Recommend follow-up imaging in 2 years   to ensure stability. 5. Advanced atherosclerotic changes of the aorta in the lower abdomen with   stenosis noted without occlusion. CT CHEST PULMONARY EMBOLISM W CONTRAST   Final Result   1. No pulmonary embolism. 2. Multifocal airspace opacities most prominent in the left lower lobe. Pattern likely represents developing pneumonia. ARDS may be considered. 3. Distended gallbladder with no pattern of inflammation or biliary   dilatation. This may be due to fasting status or other systemic processes   described above. 4. Multiple cystic lesions within the pancreas that have developed or are   increased from a prior study in 2016. Recommend follow-up imaging in 2 years   to ensure stability. 5. Advanced atherosclerotic changes of the aorta in the lower abdomen with   stenosis noted without occlusion. CT CERVICAL SPINE WO CONTRAST   Final Result   No acute abnormality of the cervical spine. CT HEAD WO CONTRAST   Final Result   No acute intracranial abnormality. XR CHEST PORTABLE   Final Result   1. Left-sided pneumonia. Recommend imaging follow-up to resolution.    2. Devices in place as above.                  Risk   Factors  Component Value   C CHF No 0   H HTN No 0   A2 Age >= 76 Yes,  (80 y.o.) 2   D DM Yes 1   S2 Prior Stroke/TIA No 0   V Vascular Disease No 0   A Age 74-69 No,  (80 y.o.) 0   Sc Sex female 1    [id number]  Score  4        ASSESMENT & PLAN:      Pneumococcal pneumonia   - imaging with Multifocal pneumonia- cx with strep pneumo   - started on cefepime and vanc-cefepime switched to Rocephin . Continue vancomycin and Rocephin , day 6 of 7 .  - blood cx remain neg   -S/p mechanical ventilation  Seen by pulmonary critical care    Acute hypoxemic resp failure  - sec to pneumonia  - started on vent support by EMS at field  - vent mx per pulmonary  S/p mechanical ventilation, patient extubated 8/20 . Remains on high flow oxygen, 10 L-> 6L-> 9L ;  wean O2 as tolerated. Patient is limited code, but family requesting reintubation if needed    Sepsis- severe with shock  - hypotensive despite IVF boluses ; was requiring pressors Levophed and vasopressin .   - S/P stress dose steroids  - Off pressors now . - continue IVF  Blood pressure stable     Afibb with RVR   - converted to NSR with amio and digoxin  - Cardiology consulted  -TSH wnl  - [id number] Score of 4; anticoagulation indicated. -Patient started on heparin drip, per cardiology recommendations to switch to NOAC when appropriate and set up a cardiac monitor at discharge.  -> Thrombocytopenia now , rule out HIT. Heparin drip stopped and patient was started on Arixtra  on 8/22 for anticoagulation    Thrombocytopenia  - HIT antibody positive, MICHAEL pending   -Stopped heparin.   -Monitor platelet count    DM 2  - holding home meds, currently on ssi     Acute renal failure  - sec to sepsis- likely ATN  - improved with IVF, continue IVF     Advanced dementia  -Limited code  But family does want reintubation if needed ; no CPR no defib     Hypokalemia   - replete        Diet: npo , on  TF . Attempt at swallowing eval in a.m.   DVT PX:  arixtra  CODE STATUS: farnaz Dennis MD, 8/23/2022 3:14 PM

## 2022-08-23 NOTE — PROGRESS NOTES
High risk vesicant drug infusing:  __________    Multiple incompatible medications infusing:  _________    CVP Monitoring:  _________    Extremely difficult IV access challenge:  ____x____    Continued need for central line access:  ____x______    Addressed with physician:  __x______    RIGHT PATIENT, RIGHT TIME, RIGHT LINE

## 2022-08-24 LAB
A/G RATIO: 0.9 (ref 1.1–2.2)
ALBUMIN SERPL-MCNC: 2.3 G/DL (ref 3.4–5)
ALP BLD-CCNC: 244 U/L (ref 40–129)
ALT SERPL-CCNC: 52 U/L (ref 10–40)
ANION GAP SERPL CALCULATED.3IONS-SCNC: 11 MMOL/L (ref 3–16)
AST SERPL-CCNC: 33 U/L (ref 15–37)
BILIRUB SERPL-MCNC: 0.5 MG/DL (ref 0–1)
BUN BLDV-MCNC: 15 MG/DL (ref 7–20)
CALCIUM SERPL-MCNC: 8.4 MG/DL (ref 8.3–10.6)
CHLORIDE BLD-SCNC: 104 MMOL/L (ref 99–110)
CO2: 26 MMOL/L (ref 21–32)
CREAT SERPL-MCNC: <0.5 MG/DL (ref 0.6–1.2)
GFR AFRICAN AMERICAN: >60
GFR NON-AFRICAN AMERICAN: >60
GLUCOSE BLD-MCNC: 110 MG/DL (ref 70–99)
GLUCOSE BLD-MCNC: 120 MG/DL (ref 70–99)
GLUCOSE BLD-MCNC: 122 MG/DL (ref 70–99)
GLUCOSE BLD-MCNC: 134 MG/DL (ref 70–99)
GLUCOSE BLD-MCNC: 136 MG/DL (ref 70–99)
GLUCOSE BLD-MCNC: 167 MG/DL (ref 70–99)
GLUCOSE BLD-MCNC: 90 MG/DL (ref 70–99)
HCT VFR BLD CALC: 32.8 % (ref 36–48)
HEMOGLOBIN: 11.3 G/DL (ref 12–16)
MAGNESIUM: 1.9 MG/DL (ref 1.8–2.4)
MCH RBC QN AUTO: 32.5 PG (ref 26–34)
MCHC RBC AUTO-ENTMCNC: 34.3 G/DL (ref 31–36)
MCV RBC AUTO: 94.8 FL (ref 80–100)
PDW BLD-RTO: 16.9 % (ref 12.4–15.4)
PERFORMED ON: ABNORMAL
PERFORMED ON: NORMAL
PLATELET # BLD: 281 K/UL (ref 135–450)
PMV BLD AUTO: 7.4 FL (ref 5–10.5)
POTASSIUM REFLEX MAGNESIUM: 3.5 MMOL/L (ref 3.5–5.1)
RBC # BLD: 3.46 M/UL (ref 4–5.2)
SODIUM BLD-SCNC: 141 MMOL/L (ref 136–145)
TOTAL PROTEIN: 4.9 G/DL (ref 6.4–8.2)
WBC # BLD: 11.3 K/UL (ref 4–11)

## 2022-08-24 PROCEDURE — 80053 COMPREHEN METABOLIC PANEL: CPT

## 2022-08-24 PROCEDURE — 85027 COMPLETE CBC AUTOMATED: CPT

## 2022-08-24 PROCEDURE — 92526 ORAL FUNCTION THERAPY: CPT

## 2022-08-24 PROCEDURE — 83735 ASSAY OF MAGNESIUM: CPT

## 2022-08-24 PROCEDURE — 2700000000 HC OXYGEN THERAPY PER DAY

## 2022-08-24 PROCEDURE — 6360000002 HC RX W HCPCS: Performed by: INTERNAL MEDICINE

## 2022-08-24 PROCEDURE — 2580000003 HC RX 258: Performed by: INTERNAL MEDICINE

## 2022-08-24 PROCEDURE — 99233 SBSQ HOSP IP/OBS HIGH 50: CPT | Performed by: INTERNAL MEDICINE

## 2022-08-24 PROCEDURE — 94761 N-INVAS EAR/PLS OXIMETRY MLT: CPT

## 2022-08-24 PROCEDURE — 2580000003 HC RX 258: Performed by: NURSE PRACTITIONER

## 2022-08-24 PROCEDURE — 2060000000 HC ICU INTERMEDIATE R&B

## 2022-08-24 RX ADMIN — CEFTRIAXONE 2000 MG: 2 INJECTION, POWDER, FOR SOLUTION INTRAMUSCULAR; INTRAVENOUS at 10:03

## 2022-08-24 RX ADMIN — Medication: at 07:56

## 2022-08-24 RX ADMIN — SODIUM CHLORIDE, SODIUM LACTATE, POTASSIUM CHLORIDE, CALCIUM CHLORIDE AND DEXTROSE MONOHYDRATE: 5; 600; 310; 30; 20 INJECTION, SOLUTION INTRAVENOUS at 06:02

## 2022-08-24 RX ADMIN — Medication 10 ML: at 07:56

## 2022-08-24 RX ADMIN — FONDAPARINUX SODIUM 7.5 MG: 7.5 INJECTION, SOLUTION SUBCUTANEOUS at 08:08

## 2022-08-24 RX ADMIN — Medication: at 20:02

## 2022-08-24 NOTE — PROGRESS NOTES
Pt continues to rest quietly and no further changes noted in exam. Vitals and SpO2 stable. All lines and monitoring devices remain in place. Pt repositioned in bed. Continue current plan of care.  Sarahi Garcia RN

## 2022-08-24 NOTE — PROGRESS NOTES
Pulmonary & Critical Care Medicine ICU Progress Note    CC: Acute respiratory failure    Events of Last 24 hours:   More alert with eyes open but non verbal.  Asymptomatic bradycardia when sleeping    Invasive Lines: 8/17/2022 right femoral CVC in ER    MV: 8/17/2022-8/20/22    IV:   dextrose 5% in lactated ringers 50 mL/hr at 08/24/22 0718    dextrose      sodium chloride         Vitals:  Blood pressure (!) 115/53, pulse 89, temperature 97.4 °F (36.3 °C), temperature source Bladder, resp. rate 21, height 5' 6\" (1.676 m), weight 144 lb 3.2 oz (65.4 kg), SpO2 95 %. on 6L   Constitutional:  No acute distress. Eyes: PERRL. Conjunctivae anicteric. ENT: Normal nose. Normal tongue. Neck:  Trachea is midline. No thyroid tenderness. Respiratory: No accessory muscle usage. No wheezes. No rales. No Rhonchi. Cardiovascular: Normal S1S2. No digit clubbing. No digit cyanosis. No LE edema. Gastrointestinal: No mass palpated. No tenderness palpated. Skin: No rash on the exposed extremities. No Nodules or induration on exposed extremities. Psychiatric: Lethargic and not following commands.       Scheduled Meds:   fondaparinux  7.5 mg SubCUTAneous Daily    vancomycin  750 mg IntraVENous Q12H    cefTRIAXone (ROCEPHIN) IV  2,000 mg IntraVENous Q24H    Venelex   Topical BID    sodium chloride flush  5-40 mL IntraVENous 2 times per day    insulin lispro  0-4 Units SubCUTAneous Q4H     PRN Meds:  glucose, dextrose bolus **OR** dextrose bolus, glucagon (rDNA), dextrose, ipratropium-albuterol, sodium chloride flush, sodium chloride, ondansetron **OR** ondansetron, polyethylene glycol, acetaminophen **OR** acetaminophen    Results:  CBC:   Recent Labs     08/22/22  0502 08/23/22  0656 08/24/22  0425   WBC 6.9 8.0 11.3*   HGB 10.2* 10.1* 11.3*   HCT 31.2* 30.2* 32.8*   MCV 96.4 95.7 94.8   * 185 281       BMP:   Recent Labs     08/22/22  0502 08/22/22  0628 08/23/22  0656 08/24/22  0425     --  138 141   K  --  4.8 3. 4* 3.5     --  104 104   CO2 25  --  28 26   BUN 26*  --  19 15   CREATININE <0.5*  --  <0.5* <0.5*       LIVER PROFILE:   Recent Labs     22  0502 22  0628 22  0656 22  0425   AST  --  56* 43* 33   ALT  --  54* 53* 52*   BILITOT 0.5  --  0.5 0.5   ALKPHOS 174*  --  190* 244*       Cultures:  2022 SARS-CoV-2 and influenza are negative  2022 blood sent  2022 streptococcal pneumonia antigen is positive  2022 tracheal aspirate MRSA (LEATHA 1) and streptococcal pneumonia;      Imagin2022 C-spine no acute abnormality  2022 chest CT left greater than right lower lobe predominant airspace disease  2022 abdominal CT gallbladder distention, cystic pancreatic lesions, atherosclerotic disease of aorta    2022 CXR left-sided airspace disease    ECHO 19 with EF 60-65%, Grade I DD     ASSESSMENT:  Acute hypoxemic respiratory failure   Septic shock resolved  Acute kidney failure has resolved  A. fib RVR, has converted to sinus bradycardia  Streptococcal pneumoniae pneumonia, respiratory culture also with MRSA  Dementia of the Alzheimer's type  Diabetes  Thrombocytopenia      PLAN:  Supplemental oxygen to maintain SaO2 >92%; wean as tolerated    Completed 7 days CTX/Vanc  Arixtra until able to give DOAC  MICHAEL pending with + HIT    Ok to be  a floor patient

## 2022-08-24 NOTE — PROGRESS NOTES
Speech Language Pathology  Dysphagia Treatment/Follow-Up Note  Facility/Department: [address] ICU    Recommendations: SLP recommends to advance to pleasure feeds only of  IDDSI 4 Puree Solids; IDDSI 0 Thin Liquids- straws OK; Meds via alt means of nutrition  Risk Management: upright for all intake, stay upright for at least 30 mins after intake, small bites/sips, total feed, hand-over-hand administration to enhance sensory response, 1:1 supervision with intake, oral care q4 hrs to reduce adverse affects in the event of aspiration, oral care 2-3x/day to reduce adverse affects in the event of aspiration, increase physical mobility as able, alternate bites/sips, slow rate of intake, general aspiration precautions, and hold PO and contact SLP if s/s of aspiration or worsening respiratory status develop. Recommend use of thermal-gustatory stimulation (really cold, hot, sweet, sour bolus) to promote improved communication between peripheral nervous system and central nervous system. This should help the patient recognize task and improve safety with PO intake. NAME:Shilpa Reece  : 1940 (80 y.o.)   MRN: [id number]  ROOM: [id number]  ADMISSION DATE: 2022  PATIENT DIAGNOSIS(ES): Multifocal pneumonia [J18.9]  Allergies   Allergen Reactions    Avandia [Rosiglitazone Maleate]     Codeine      Per son Codeine caused patient to go into cardiac arrest    Glucophage [Metformin Hydrochloride]      Lactic acidosis      Hydrochlorothiazide     Tramadol        DATE ONSET: 2022    Pain: The patient does not complain of pain- pt grossly non-verbal       Current Diet: Diet NPO    Diet Tolerance:  Pt currently NPO. Dysphagia Treatment and Impressions:  Subjective: Pt seen in room at bedside with RN permission  RN Report/Chart Review: Pt somewhat more alert today with eyes open.  Remains non-verbal  Patient tolerance: Pt is NPO     Respiratory Status: Pt with SPO2% of 92 on 8 LPM HFNC with RR of 16    Liquid PO Trials:   IDDSI 0 Thin:  Assessed via 1/2 tsp (2.5cc), cup, and straw: no anterior bolus loss , suspect functional A-P bolus transit, swallow timing subjectively appears timely, and no clinical s/s of aspiration. Vitals remained stable. Possible delayed swallow with tsp sip, however, difficult to determine at bedside. Solid PO Trials  IDDSI 4 Puree:   utilized gustatory stim via cold and sweet bolus to promote improved sensory awareness. There was no anterior bolus loss , suspect reduced/impaired A-P bolus transit, swallow timing subjectively appears timely, oral stasis noted post swallow, no clinical s/s of aspiration, and vitals stable    Education: SLP edu pt re: Role of SLP, Rationale for dysphagia tx, Recommended compensatory strategies, Aspiration precautions, BSE results, and POC. Pt no evidence of learning and RN aware of recommendations  Assessment: Pt grossly tolerating Pleasure feeds diet of puree and thin liquids with no clinical s/s of aspiration. Poor carryover of recommended compensatory strategies. Recommend use of thermal-gustatory stimulation (really cold, hot, sweet, sour bolus) to promote improved communication between peripheral nervous system and central nervous system. This should help the patient recognize task and improve safety with PO intake. Recommendations: SLP recommends to advance to pleasure feeds of  IDDSI 4 Puree Solids; IDDSI 0 Thin Liquids- straws OK;  Meds via alt means of nutrition  Risk Management: upright for all intake, stay upright for at least 30 mins after intake, small bites/sips, total feed, hand-over-hand administration to enhance sensory response, 1:1 supervision with intake, oral care q4 hrs to reduce adverse affects in the event of aspiration, oral care 2-3x/day to reduce adverse affects in the event of aspiration, increase physical mobility as able, alternate bites/sips, slow rate of intake, general aspiration precautions, and hold PO and contact SLP if s/s of aspiration or worsening respiratory status develop. Recommend use of thermal-gustatory stimulation (really cold, hot, sweet, sour bolus) to promote improved communication between peripheral nervous system and central nervous system. This should help the patient recognize task and improve safety with PO intake. Dysphagia Goals:  Goals:  Short Term Goals:  Timeframe for Short Term Goals: (5 days 08/28/2022)  Goal 1: The patient will tolerate repeat BSE when able for ongoing assessment  8/24/2022 : Goal addressed, see above. Ongoing, progressing. Goal 2: The patient will tolerate instrumental assessment when able   8/24/2022 : Goal addressed, no appropriate at this time. Ongoing, progressing. Goal 3: The patient will tolerate recommended diet with no clinical s/s of aspiration 5/5 8/24/2022 : Goal addressed, see above. Ongoing, progressing. Goal 4: The patient will tolerate therapeutic diet upgrade trials with no clinical s/s of aspiration 5/5 8/24/2022 : Goal addressed, see above. Ongoing, progressing. Long Term Goals:   Timeframe for Long Term Goals: (7 days 08/30/2022)  Goal 1: The patient will tolerate least restrictive diet with no clinical s/s of aspiration or worsening respiratory/pulmonary status  8/24/2022 : Ongoing, progressing. Speech/Language/Cog Goals: N/A     Recommendations:  Solid Consistency: NPO w/low vol pleasure feeds of puree  Liquid Consistency: NPO w/low vol pleasure feeds thin liquids-straws OK  Medication: Meds via alt means of nutrition    Plan:    Continued Dysphagia treatment with goals per plan of care. Discharge Recommendations: TBD    If pt discharges from hospital prior to Speech/Swallowing discharge, this note serves as tx and discharge summary.      Total Treatment Time / Charges     Time in Time out Total Time / units   Cognitive Tx         Speech Tx      Dysphagia Tx 0925 0950 25 mins / 1 unit     Signature:  Alessia Payne M.A., CCC-SLP #[id number]  Speech-Language Pathologist  Phone: [facility]- [phone], Desk- [phone]  Hours: M-F [phone]

## 2022-08-24 NOTE — CARE COORDINATION
INTERDISCIPLINARY PLAN OF CARE CONFERENCE    Date/Time: 8/24/2022 11:48 AM  Completed by: ZENY Forrester  Case Management      Patient Name:  Kayden Kunz  YOB: 1940  Admitting Diagnosis: Multifocal pneumonia [J18.9]     Admit Date/Time:  8/17/2022 12:16 PM    Chart reviewed. Interdisciplinary team contacted or reviewed plan related to patient progress and discharge plans. Disciplines included Case Management, Nursing, and Dietitian. Current Status:ongoing   PT/OT recommendation for discharge plan of care: n/a    Expected D/C Disposition: RecordDebt. (OVM)  Confirmed plan with patient and/or family Yes confirmed with: (name) pt's son Terri Fernandes via phone call     Discharge Plan Comments: Chart review completed. Pt remains in the ICU requiring 9 liters of o2. Message left for Corrine Manuel at [place] [address] requesting a call back to confirm what litter flow pt's o2 needs to be at to return. Called and spoke with pt's son Terri Fernandes. He stated that they are wanting pt to move to [facility] ([facility]) for long term care as it is closer. He stated they have spoke with [facility] but not updated OVM yet. Explained writer will call [facility] per his request but encouraged him to call OVM to update as writer is awaiting return call from [place] [address]. He stated understanding. He stated that T stated they have a bed and was going to call the hospital. No questions expressed     Referral called to [facility] at [address] [place] [address] Drive for long term care. He stated he will call writer once reviewed. Pt will require a new level of care to transition to [facility]. Home O2 in place on admit: per facility if needed.      Addendum at 3:40pm: Received voicemail from Corrine Manuel at [place] [address] stating that o2 needs to be at 5 liters or less

## 2022-08-24 NOTE — PROGRESS NOTES
ICU Progress Note    Admit Date:  8/17/2022  7    Interval history:    Admitted with pneumonia and respiratory failure, s/p mechanical ventilation. Extubated on 8/20-on high flow oxygen. A. fib RVR, placed on heparin drip-> discontinued due to thrombocytopenia  Hypotensive on Levophed-> weaned off of Levophed on 8/21      Subjective:  Ms. Capone Marrow seen. She is doing about the same. She has been doing the same for the last couple of days. She is awake, confused, not oriented. Constantly moaning and mumbling. She has advanced dementia. Remains on high flow oxygen, 10-> 9-> 8 L. Asymptomatic bradycardia. Persistent increased respiratory secretions requiring frequent suctioning. NPO, swallowing eval could not be completed for the last couple of days since extubation due to patient's mental status. Speech therapist reevaluated today and has recommended pleasure feeds. Limited code; family does want reintubation if respiratory status worsens    Blood pressure stable remains off of pressors. 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 Objective:   BP (!) 100/56   Pulse 74   Temp 97.4 °F (36.3 °C) (Bladder)   Resp 19   Ht 5' 6\" (1.676 m)   Wt 144 lb 3.2 oz (65.4 kg)   SpO2 95%   BMI 23.27 kg/m²     Intake/Output Summary (Last 24 hours) at 8/24/2022 1307  Last data filed at 8/24/2022 1206  Gross per 24 hour   Intake 2032.44 ml   Output 580 ml   Net 1452.44 ml         Physical Exam:    General: elderly female,   Awake , responding a little , confused  . Mumbling  Appears to be not in any distress  Mucous Membranes:  Pink , anicteric  Neck: No JVD, no carotid bruit, no thyromegaly  Chest: Diminished breath sounds ; no wheezes rales or rhonchi today  Cardiovascular:  RRR S1S2 heard, no murmurs or gallops  Abdomen:  Soft, undistended, non tender, no organomegaly, BS present  Extremities: right femoral TLC   No edema or cyanosis. Distal pulses well felt  Neurological : Nonfocal       Medications:   Scheduled Medications:    fondaparinux  7.5 mg SubCUTAneous Daily    Venelex   Topical BID    sodium chloride flush  5-40 mL IntraVENous 2 times per day    insulin lispro  0-4 Units SubCUTAneous Q4H     I   dextrose 5% in lactated ringers 50 mL/hr at 08/24/22 0718    dextrose      sodium chloride       glucose, dextrose bolus **OR** dextrose bolus, glucagon (rDNA), dextrose, ipratropium-albuterol, sodium chloride flush, sodium chloride, ondansetron **OR** ondansetron, polyethylene glycol, acetaminophen **OR** acetaminophen    Lab Data:  Recent Labs     08/22/22  0502 08/23/22  0656 08/24/22  0425   WBC 6.9 8.0 11.3*   HGB 10.2* 10.1* 11.3*   HCT 31.2* 30.2* 32.8*   MCV 96.4 95.7 94.8   * 185 281       Recent Labs     08/22/22  0502 08/22/22  0628 08/23/22  0656 08/24/22  0425     --  138 141   K  --  4.8 3.4* 3.5     --  104 104   CO2 25  --  28 26   BUN 26*  --  19 15   CREATININE <0.5*  --  <0.5* <0.5*       No results for input(s): CKTOTAL, CKMB, CKMBINDEX, TROPONINI in the last 72 hours.       Coagulation:   Lab Results   Component Value Date/Time    INR 1.24 08/17/2022 12:10 PM    APTT 24.1 08/17/2022 02:52 PM     Cardiac markers:   Lab Results   Component Value Date/Time    CKTOTAL 38 03/21/2017 11:23 AM    TROPONINI 0.02 08/18/2022 03:39 AM         Lab Results   Component Value Date    ALT 52 (H) 08/24/2022    AST 33 08/24/2022    ALKPHOS 244 (H) 08/24/2022    BILITOT 0.5 08/24/2022       Lab Results   Component Value Date    INR 1.24 (H) 08/17/2022    INR 1.06 09/05/2017    INR 1.13 04/19/2017    PROTIME 15.5 (H) 08/17/2022    PROTIME 12.3 09/05/2017    PROTIME 12.8 04/19/2017       Cultures  Covid not detected  Resp cx strep pneumonia staph aureus  Strep pneumoniae - pneumococcal pneumonia     Radiology:  XR CHEST PORTABLE   Final Result   Patchy infiltrates seen within the lungs bilaterally which may represent multifocal pneumonia. XR CHEST PORTABLE   Final Result   Slightly improving aeration of the lungs in this intubated patient. XR CHEST PORTABLE   Final Result   No significant interval change in moderate left-sided airspace disease as   compared to prior. Trace bilateral pleural effusions or pleural thickening. CT ABDOMEN PELVIS W IV CONTRAST Additional Contrast? None   Final Result   1. No pulmonary embolism. 2. Multifocal airspace opacities most prominent in the left lower lobe. Pattern likely represents developing pneumonia. ARDS may be considered. 3. Distended gallbladder with no pattern of inflammation or biliary   dilatation. This may be due to fasting status or other systemic processes   described above. 4. Multiple cystic lesions within the pancreas that have developed or are   increased from a prior study in 2016. Recommend follow-up imaging in 2 years   to ensure stability. 5. Advanced atherosclerotic changes of the aorta in the lower abdomen with   stenosis noted without occlusion. CT CHEST PULMONARY EMBOLISM W CONTRAST   Final Result   1. No pulmonary embolism. 2. Multifocal airspace opacities most prominent in the left lower lobe. Pattern likely represents developing pneumonia. ARDS may be considered. 3. Distended gallbladder with no pattern of inflammation or biliary   dilatation. This may be due to fasting status or other systemic processes   described above. 4. Multiple cystic lesions within the pancreas that have developed or are   increased from a prior study in 2016. Recommend follow-up imaging in 2 years   to ensure stability. 5. Advanced atherosclerotic changes of the aorta in the lower abdomen with   stenosis noted without occlusion. CT CERVICAL SPINE WO CONTRAST   Final Result   No acute abnormality of the cervical spine. CT HEAD WO CONTRAST   Final Result   No acute intracranial abnormality.          XR CHEST PORTABLE Final Result   1. Left-sided pneumonia. Recommend imaging follow-up to resolution. 2. Devices in place as above. Risk   Factors  Component Value   C CHF No 0   H HTN No 0   A2 Age >= 76 Yes,  (80 y.o.) 2   D DM Yes 1   S2 Prior Stroke/TIA No 0   V Vascular Disease No 0   A Age 74-69 No,  (80 y.o.) 0   Sc Sex female 1    [id number]  Score  4        ASSESMENT & PLAN:      Pneumococcal pneumonia   - imaging with Multifocal pneumonia- cx with strep pneumo   - started on cefepime and vanc-cefepime switched to Rocephin . Continue vancomycin and Rocephin , day 7 of 7 . Course of antibiotics completed today  - blood cx remain neg   -S/p mechanical ventilation  Seen by pulmonary critical care    Acute hypoxemic resp failure  - sec to pneumonia  - started on vent support by EMS at field  - vent mx per pulmonary  S/p mechanical ventilation, patient extubated 8/20 . Remains on high flow oxygen, 10 L-> 6L-> 9L-> 8L ;  wean O2 as tolerated. Patient is limited code, but family requesting reintubation if needed    Sepsis- severe with shock  - hypotensive despite IVF boluses ; was requiring pressors Levophed and vasopressin .   - S/P stress dose steroids  - Off pressors now . - continue IVF  Blood pressure stable     Afibb with RVR   - converted to NSR with amio and digoxin  - Cardiology consulted  -TSH wnl  - [id number] Score of 4; anticoagulation indicated. -Patient started on heparin drip, per cardiology recommendations to switch to NOAC when appropriate and set up a cardiac monitor at discharge.  -> Thrombocytopenia now , rule out HIT.   Heparin drip stopped and patient was started on Arixtra  on 8/22 for anticoagulation    Thrombocytopenia  - HIT antibody positive, MICHAEL pending   -Stopped heparin.   -Monitor platelet count    DM 2  - holding home meds, currently on ssi     Acute renal failure  - sec to sepsis- likely ATN  - improved with IVF, continue IVF     Advanced dementia  -Limited code  But

## 2022-08-25 LAB
ANION GAP SERPL CALCULATED.3IONS-SCNC: 10 MMOL/L (ref 3–16)
BUN BLDV-MCNC: 15 MG/DL (ref 7–20)
CALCIUM SERPL-MCNC: 8.5 MG/DL (ref 8.3–10.6)
CHLORIDE BLD-SCNC: 104 MMOL/L (ref 99–110)
CO2: 28 MMOL/L (ref 21–32)
CREAT SERPL-MCNC: <0.5 MG/DL (ref 0.6–1.2)
GFR AFRICAN AMERICAN: >60
GFR NON-AFRICAN AMERICAN: >60
GLUCOSE BLD-MCNC: 106 MG/DL (ref 70–99)
GLUCOSE BLD-MCNC: 159 MG/DL (ref 70–99)
GLUCOSE BLD-MCNC: 168 MG/DL (ref 70–99)
GLUCOSE BLD-MCNC: 173 MG/DL (ref 70–99)
GLUCOSE BLD-MCNC: 196 MG/DL (ref 70–99)
GLUCOSE BLD-MCNC: 214 MG/DL (ref 70–99)
GLUCOSE BLD-MCNC: 234 MG/DL (ref 70–99)
HCT VFR BLD CALC: 32.5 % (ref 36–48)
HEMOGLOBIN: 11 G/DL (ref 12–16)
MCH RBC QN AUTO: 31.8 PG (ref 26–34)
MCHC RBC AUTO-ENTMCNC: 33.9 G/DL (ref 31–36)
MCV RBC AUTO: 93.8 FL (ref 80–100)
PDW BLD-RTO: 16.8 % (ref 12.4–15.4)
PERFORMED ON: ABNORMAL
PLATELET # BLD: 380 K/UL (ref 135–450)
PMV BLD AUTO: 7.1 FL (ref 5–10.5)
POTASSIUM SERPL-SCNC: 3.1 MMOL/L (ref 3.5–5.1)
RBC # BLD: 3.47 M/UL (ref 4–5.2)
SODIUM BLD-SCNC: 142 MMOL/L (ref 136–145)
WBC # BLD: 12.4 K/UL (ref 4–11)

## 2022-08-25 PROCEDURE — 2580000003 HC RX 258: Performed by: NURSE PRACTITIONER

## 2022-08-25 PROCEDURE — 6360000002 HC RX W HCPCS

## 2022-08-25 PROCEDURE — 80048 BASIC METABOLIC PNL TOTAL CA: CPT

## 2022-08-25 PROCEDURE — 2060000000 HC ICU INTERMEDIATE R&B

## 2022-08-25 PROCEDURE — 6370000000 HC RX 637 (ALT 250 FOR IP): Performed by: NURSE PRACTITIONER

## 2022-08-25 PROCEDURE — 99233 SBSQ HOSP IP/OBS HIGH 50: CPT | Performed by: INTERNAL MEDICINE

## 2022-08-25 PROCEDURE — 92526 ORAL FUNCTION THERAPY: CPT

## 2022-08-25 PROCEDURE — 2580000003 HC RX 258: Performed by: INTERNAL MEDICINE

## 2022-08-25 PROCEDURE — 85027 COMPLETE CBC AUTOMATED: CPT

## 2022-08-25 PROCEDURE — 6360000002 HC RX W HCPCS: Performed by: INTERNAL MEDICINE

## 2022-08-25 RX ORDER — POTASSIUM CHLORIDE 750 MG/1
40 TABLET, EXTENDED RELEASE ORAL PRN
Status: DISCONTINUED | OUTPATIENT
Start: 2022-08-25 | End: 2022-08-30 | Stop reason: HOSPADM

## 2022-08-25 RX ORDER — POTASSIUM CHLORIDE 7.45 MG/ML
10 INJECTION INTRAVENOUS PRN
Status: DISCONTINUED | OUTPATIENT
Start: 2022-08-25 | End: 2022-08-30 | Stop reason: HOSPADM

## 2022-08-25 RX ORDER — FUROSEMIDE 10 MG/ML
20 INJECTION INTRAMUSCULAR; INTRAVENOUS ONCE
Status: COMPLETED | OUTPATIENT
Start: 2022-08-25 | End: 2022-08-25

## 2022-08-25 RX ORDER — MAGNESIUM SULFATE IN WATER 40 MG/ML
2000 INJECTION, SOLUTION INTRAVENOUS PRN
Status: DISCONTINUED | OUTPATIENT
Start: 2022-08-25 | End: 2022-08-30 | Stop reason: HOSPADM

## 2022-08-25 RX ADMIN — FONDAPARINUX SODIUM 7.5 MG: 7.5 INJECTION, SOLUTION SUBCUTANEOUS at 09:42

## 2022-08-25 RX ADMIN — FUROSEMIDE 20 MG: 10 INJECTION, SOLUTION INTRAMUSCULAR; INTRAVENOUS at 14:47

## 2022-08-25 RX ADMIN — Medication 10 ML: at 20:39

## 2022-08-25 RX ADMIN — Medication: at 09:35

## 2022-08-25 RX ADMIN — Medication: at 20:39

## 2022-08-25 RX ADMIN — POTASSIUM CHLORIDE 10 MEQ: 7.46 INJECTION, SOLUTION INTRAVENOUS at 17:00

## 2022-08-25 RX ADMIN — SODIUM CHLORIDE, SODIUM LACTATE, POTASSIUM CHLORIDE, CALCIUM CHLORIDE AND DEXTROSE MONOHYDRATE: 5; 600; 310; 30; 20 INJECTION, SOLUTION INTRAVENOUS at 02:28

## 2022-08-25 RX ADMIN — SODIUM CHLORIDE, SODIUM LACTATE, POTASSIUM CHLORIDE, CALCIUM CHLORIDE AND DEXTROSE MONOHYDRATE: 5; 600; 310; 30; 20 INJECTION, SOLUTION INTRAVENOUS at 21:50

## 2022-08-25 RX ADMIN — POTASSIUM CHLORIDE 10 MEQ: 7.46 INJECTION, SOLUTION INTRAVENOUS at 11:36

## 2022-08-25 RX ADMIN — POTASSIUM CHLORIDE 10 MEQ: 7.46 INJECTION, SOLUTION INTRAVENOUS at 14:46

## 2022-08-25 RX ADMIN — INSULIN LISPRO 1 UNITS: 100 INJECTION, SOLUTION INTRAVENOUS; SUBCUTANEOUS at 12:01

## 2022-08-25 RX ADMIN — POTASSIUM CHLORIDE 10 MEQ: 7.46 INJECTION, SOLUTION INTRAVENOUS at 16:07

## 2022-08-25 NOTE — PROGRESS NOTES
4 Eyes Skin Assessment     The patient is being assess for   Transfer to New Unit    I agree that 2 RN's have performed a thorough Head to Toe Skin Assessment on the patient. ALL assessment sites listed below have been assessed. Areas assessed for pressure by both nurses:   [x]   Head, Face, and Ears   [x]   Shoulders, Back, and Chest, Abdomen  [x]   Arms, Elbows, and Hands   [x]   Coccyx, Sacrum, and Ischium  [x]   Legs, Feet, and Heels   Scattered bruises and abrasions. Blanchable redness to bilateral heels. DTI to coccyx. Skin Assessed Under all Medical Devices by both nurses:  O2 device tubing, may, and heel boots              All Mepilex Borders were peeled back and area peeked at by both nurses:  Yes  Please list where Mepilex Borders are located:  BL Heels             **SHARE this note so that the co-signing nurse is able to place an eSignature**    Co-signer eSignature: Electronically signed by Madeleine Gay RN on 8/24/22 at 9:46 PM EDT    Does the Patient have Skin Breakdown related to pressure?   Yes LDA WOUND CARE was Initiated documentation include the Nereida-wound, Wound Assessment, Measurements, Dressing Treatment, Drainage, and Color\",     (Insert Photo here  )         Bobby Prevention initiated:  Yes   Wound Care Orders initiated:  Yes      [address]  nurse consulted for Pressure Injury (Stage 3,4, Unstageable, DTI, NWPT, Complex wounds)and New or Established Ostomies:  NA  (already on case)    Primary Nurse eSignature: Electronically signed by Manav Miller RN on 8/24/22 at 8:33 PM EDT

## 2022-08-25 NOTE — FLOWSHEET NOTE
08/25/22 0718   Vitals   Temp 97.1 °F (36.2 °C)   Temp Source Oral   Heart Rate 82   Heart Rate Source Monitor   Resp 20   BP (!) 144/85   BP Location Right upper arm   Level of Consciousness 0   MEWS Score 1   Oxygen Therapy   SpO2 99 %   O2 Device High flow nasal cannula   O2 Flow Rate (L/min) 8 L/min   Shift assessment completed-see flow sheet. Patient in bed, resting with eyes closed. Awakens to name. Pt is nonverbal.  Vitals stable. Morning medications given per order. Patient repositioned in bed. No issues noted at this time,call light within reach.

## 2022-08-25 NOTE — FLOWSHEET NOTE
08/25/22 0215   Vital Signs   Temp 97.6 °F (36.4 °C)   Temp Source Axillary   Heart Rate 76   Heart Rate Source Monitor   Resp 18   /75   BP Location Right upper arm   BP Method Automatic   MAP (Calculated) 91.67   Patient Position Semi fowlers   Level of Consciousness 0   MEWS Score 1   Oxygen Therapy   SpO2 97 %   O2 Device High flow nasal cannula   O2 Flow Rate (L/min) 10 L/min   Vitas obtained and stable. Pt resting in bed. Bed in low position and alarm on.  Michelle Benson RN

## 2022-08-25 NOTE — CARE COORDINATION
INTERDISCIPLINARY PLAN OF CARE CONFERENCE    Date/Time: 8/25/2022 3:02 PM  Completed by: ZENY Richard  Case Management      Patient Name:  Chelsea Wang  YOB: 1940  Admitting Diagnosis: Multifocal pneumonia [J18.9]     Admit Date/Time:  8/17/2022 12:16 PM    Chart reviewed. Interdisciplinary team contacted or reviewed plan related to patient progress and discharge plans. Disciplines included Case Management, Nursing, and Dietitian. Current Status:ongoing   PT/OT recommendation for discharge plan of care: n/a    Expected D/C Disposition:  Nursing Home    Discharge Plan Comments: Chart review completed. RN CM received call from Dorie Shah at [facility] yesterday stating they can accept pt for long term care.      Message left for pt's son Cici Lopes requesting call back to update    Home O2 in place on admit: per facility if needed

## 2022-08-25 NOTE — PROGRESS NOTES
Pt transferred from ICU to PCU at this time. Vitals obtained, pt O2 was increased to 15L high flow NC and O2 level is now at 90%. Shift assessment completed, see flow sheet. Bed low position. Call light in reach. Alarm on bed.  Barbra Ulrich RN

## 2022-08-25 NOTE — PROGRESS NOTES
Pulmonary & Critical Care Medicine ICU Progress Note    CC: Acute respiratory failure    Events of Last 24 hours:   More alert with eyes open but non verbal.  Asymptomatic bradycardia when sleeping    Invasive Lines: 8/17/2022 right femoral CVC in ER    MV: 8/17/2022-8/20/22    IV:   dextrose 5% in lactated ringers 50 mL/hr at 08/25/22 0228    dextrose      sodium chloride       Vitals:  Blood pressure (!) 144/85, pulse 82, temperature 97.1 °F (36.2 °C), temperature source Oral, resp. rate 20, height 5' 6\" (1.676 m), weight 141 lb (64 kg), SpO2 99 %. on 6L   Constitutional:  No acute distress. Eyes: PERRL. Conjunctivae anicteric. ENT: Normal nose. Normal tongue. Neck:  Trachea is midline. No thyroid tenderness. Respiratory: No accessory muscle usage. No wheezes. No rales. No Rhonchi. Cardiovascular: Normal S1S2. No digit clubbing. No digit cyanosis. No LE edema. Gastrointestinal: No mass palpated. No tenderness palpated. Skin: No rash on the exposed extremities. No Nodules or induration on exposed extremities. Psychiatric: Lethargic and not following commands.       Scheduled Meds:   fondaparinux  7.5 mg SubCUTAneous Daily    Venelex   Topical BID    sodium chloride flush  5-40 mL IntraVENous 2 times per day    insulin lispro  0-4 Units SubCUTAneous Q4H     PRN Meds:  potassium chloride **OR** potassium alternative oral replacement **OR** potassium chloride, magnesium sulfate, glucose, dextrose bolus **OR** dextrose bolus, glucagon (rDNA), dextrose, ipratropium-albuterol, sodium chloride flush, sodium chloride, ondansetron **OR** ondansetron, polyethylene glycol, acetaminophen **OR** acetaminophen    Results:  CBC:   Recent Labs     08/23/22  0656 08/24/22  0425 08/25/22  0553   WBC 8.0 11.3* 12.4*   HGB 10.1* 11.3* 11.0*   HCT 30.2* 32.8* 32.5*   MCV 95.7 94.8 93.8    281 380       BMP:   Recent Labs     08/23/22  0656 08/24/22  0425 08/25/22  0553    141 142   K 3.4* 3.5 3.1*    104 104   CO2 28 26 28   BUN 19 15 15   CREATININE <0.5* <0.5* <0.5*       LIVER PROFILE:   Recent Labs     22  0656 22  0425   AST 43* 33   ALT 53* 52*   BILITOT 0.5 0.5   ALKPHOS 190* 244*       Cultures:  2022 SARS-CoV-2 and influenza are negative  2022 blood sent  2022 streptococcal pneumonia antigen is positive  2022 tracheal aspirate MRSA (LEATHA 1) and streptococcal pneumonia;      Imagin2022 C-spine no acute abnormality  2022 chest CT left greater than right lower lobe predominant airspace disease  2022 abdominal CT gallbladder distention, cystic pancreatic lesions, atherosclerotic disease of aorta    2022 CXR left-sided airspace disease    ECHO 19 with EF 60-65%, Grade I DD     ASSESSMENT:  Acute hypoxemic respiratory failure   Septic shock resolved  Acute kidney failure has resolved  A. fib RVR, has converted to sinus bradycardia  Streptococcal pneumoniae pneumonia, respiratory culture also with MRSA  Dementia of the Alzheimer's type  Diabetes  Thrombocytopenia      PLAN:  Supplemental oxygen to maintain SaO2 >92%; wean as tolerated    Completed 7 days CTX/Vanc  Arixtra until able to give DOAC  MICHAEL pending with + HIT    Lasix 20mg IV x 1

## 2022-08-25 NOTE — PROGRESS NOTES
Speech Language Pathology  Dysphagia Treatment/Follow-Up Note  Facility/Department: [facility] ICU    Recommendations: SLP recommends to advance to IDDSI 4 Puree Solids; IDDSI 0 Thin Liquids- straws OK; Meds crushed in puree as able. Risk Management: upright for all intake, stay upright for at least 30 mins after intake, small bites/sips, total feed, hand-over-hand administration to enhance sensory response, 1:1 supervision with intake, oral care q4 hrs to reduce adverse affects in the event of aspiration, oral care 2-3x/day to reduce adverse affects in the event of aspiration, increase physical mobility as able, alternate bites/sips, slow rate of intake, general aspiration precautions, and hold PO and contact SLP if s/s of aspiration or worsening respiratory status develop. Recommend use of thermal-gustatory stimulation (really cold, hot, sweet, sour bolus) to promote improved communication between peripheral nervous system and central nervous system. This should help the patient recognize task and improve safety with PO intake. Suspect limited PO intake-recommend RD consult. NAME:Shilpa Reece  : 1940 (80 y.o.)   MRN: [id number]  ROOM: [place] [address]  ADMISSION DATE: 2022  PATIENT DIAGNOSIS(ES): Multifocal pneumonia [J18.9]  Allergies   Allergen Reactions    Avandia [Rosiglitazone Maleate]     Codeine      Per son Codeine caused patient to go into cardiac arrest    Glucophage [Metformin Hydrochloride]      Lactic acidosis      Hydrochlorothiazide     Tramadol        DATE ONSET: 2022    Pain: The patient does not complain of pain- pt grossly non-verbal       Current Diet: Diet NPO    Diet Tolerance:  Pt currently NPO. Dysphagia Treatment and Impressions:  Subjective: Pt seen in room at bedside with RN permission  RN Report/Chart Review: Pt transferred to PCU.  Remains non-verbal  Patient tolerance: Pt is NPO     Respiratory Status: Pt with SPO2% of 99 on 4 LPM HFNC with RR of 16    Liquid PO Trials:   IDDSI 0 Thin:  Assessed via 1/2 tsp (2.5cc), cup, and straw: no anterior bolus loss , suspect functional A-P bolus transit, swallow timing subjectively appears timely, and no clinical s/s of aspiration. Vitals remained stable. Possible delayed swallow with tsp sip, however, difficult to determine at bedside. Solid PO Trials  IDDSI 4 Puree:   utilized gustatory stim via cold and sweet bolus to promote improved sensory awareness. There was no anterior bolus loss , suspect reduced/impaired A-P bolus transit, swallow timing subjectively appears timely, oral stasis noted post swallow, no clinical s/s of aspiration, and vitals stable    Education: SLP edu pt re: Role of SLP, Rationale for dysphagia tx, Recommended compensatory strategies, Aspiration precautions, BSE results, and POC. Pt no evidence of learning and RN aware of recommendations  Assessment: Pt grossly tolerating puree and thin liquids with no clinical s/s of aspiration. Poor carryover of recommended compensatory strategies. Recommend use of thermal-gustatory stimulation (really cold, hot, sweet, sour bolus) to promote improved communication between peripheral nervous system and central nervous system. This should help the patient recognize task and improve safety with PO intake. Suspect limited PO intake-recommend RD consult. Recommendations: SLP recommends to advance to  IDDSI 4 Puree Solids; IDDSI 0 Thin Liquids- straws OK;  Meds crushed in puree as able  Risk Management: upright for all intake, stay upright for at least 30 mins after intake, small bites/sips, total feed, hand-over-hand administration to enhance sensory response, 1:1 supervision with intake, oral care q4 hrs to reduce adverse affects in the event of aspiration, oral care 2-3x/day to reduce adverse affects in the event of aspiration, increase physical mobility as able, alternate bites/sips, slow rate of intake, general aspiration precautions, and hold PO and contact SLP if s/s of aspiration or worsening respiratory status develop. Recommend use of thermal-gustatory stimulation (really cold, hot, sweet, sour bolus) to promote improved communication between peripheral nervous system and central nervous system. This should help the patient recognize task and improve safety with PO intake. Suspect limited PO intake-recommend RD consult. Dysphagia Goals:  Goals:  Short Term Goals:  Timeframe for Short Term Goals: (5 days 08/28/2022)  Goal 1: The patient will tolerate repeat BSE when able for ongoing assessment  8/25/2022 : Goal addressed, see above. Ongoing, progressing. Goal 2: The patient will tolerate instrumental assessment when able   8/25/2022 : Goal addressed, no appropriate at this time. Ongoing, progressing. Goal 3: The patient will tolerate recommended diet with no clinical s/s of aspiration 5/5 8/25/2022 : Goal addressed, see above. Ongoing, progressing. Goal 4: The patient will tolerate therapeutic diet upgrade trials with no clinical s/s of aspiration 5/5 8/25/2022 : Goal addressed, see above. Ongoing, progressing. Long Term Goals:   Timeframe for Long Term Goals: (7 days 08/30/2022)  Goal 1: The patient will tolerate least restrictive diet with no clinical s/s of aspiration or worsening respiratory/pulmonary status  8/25/2022 : Ongoing, progressing. Speech/Language/Cog Goals: N/A     Recommendations:  Solid Consistency: puree  Liquid Consistency: thin liquids-straws OK  Medication: Meds crushed in puree as able    Plan:    Continued Dysphagia treatment with goals per plan of care. Discharge Recommendations: TBD    If pt discharges from hospital prior to Speech/Swallowing discharge, this note serves as tx and discharge summary.      Total Treatment Time / Charges     Time in Time out Total Time / units   Cognitive Tx         Speech Tx      Dysphagia Tx 1218 1231 13 mins / 1 unit     Signature:  Raysa Lorenzo M.A., Jose Mai #[id number]  Speech-Language Pathologist  Phone: [address] [place] [address], Desk- [phone]  Hours: M-F [phone]

## 2022-08-25 NOTE — PLAN OF CARE
Problem: Discharge Planning  Goal: Discharge to home or other facility with appropriate resources  Outcome: Progressing     Problem: Nutrition Deficit:  Goal: Optimize nutritional status  Outcome: Progressing     Problem: Skin/Tissue Integrity  Goal: Absence of new skin breakdown  Description: 1. Monitor for areas of redness and/or skin breakdown  2. Assess vascular access sites hourly  3. Every 4-6 hours minimum:  Change oxygen saturation probe site  4. Every 4-6 hours:  If on nasal continuous positive airway pressure, respiratory therapy assess nares and determine need for appliance change or resting period.   Outcome: Progressing

## 2022-08-25 NOTE — PROGRESS NOTES
Progress Note    Admit Date:  8/17/2022    79 y/o female with pmhx of DM, HTN, HLD, OA, hx of CVA   -presented with respiratory distress   -severe sepsis, was placed on levophed and on vent, admitted to ICU --> weaned off levophed   -was in Afib with RVR, started on amiodarone and digoxin, on heparin drip--> d/phill 2/2 to thrombocytopenia, now on arixtra   -off vent, on high flow O2     Subjective:  Ms. David Yeung today is seen resting in bed. She is awake, moaning and mumbling. She is responding to painful stimuli. She has advanced dementia, nonverbal.   She is stable on 4 L O2 today. Objective:   Patient Vitals for the past 4 hrs:   BP Temp Temp src Pulse Resp SpO2   08/25/22 0926 -- -- -- -- -- 99 %   08/25/22 0815 -- -- -- -- -- 98 %   08/25/22 0718 (!) 144/85 97.1 °F (36.2 °C) Oral 82 20 99 %          Intake/Output Summary (Last 24 hours) at 8/25/2022 0959  Last data filed at 8/25/2022 0807  Gross per 24 hour   Intake 839.17 ml   Output 305 ml   Net 534.17 ml       Physical Exam:    Gen: No distress. Alert. Elderly female   Eyes: PERRL. No sclera icterus. No conjunctival injection. Neck: No JVD. Trachea midline. Resp: No accessory muscle use. No crackles. No wheezes. No rhonchi. +Diminished breath sounds bilaterally   CV: Regular rate. Regular rhythm. No murmur. No rub. No edema. Peripheral Pulses: +2 palpable, equal bilaterally   GI: Non-tender. Non-distended. Normal bowel sounds. Skin: Warm and dry. No nodule on exposed extremities. No rash on exposed extremities. M/S: No cyanosis. No joint deformity. No clubbing. Neuro: Awake. Grossly nonfocal. Minimally responding, moaning   Psych: No anxiety or agitation.          Medications:  fondaparinux, 7.5 mg, Daily  Venelex, , BID  sodium chloride flush, 5-40 mL, 2 times per day  insulin lispro, 0-4 Units, Q4H      PRN Medications:  glucose, 4 tablet, PRN  dextrose bolus, 125 mL, PRN   Or  dextrose bolus, 250 mL, PRN  glucagon (rDNA), 1 mg, PRN  dextrose, , Continuous PRN  ipratropium-albuterol, 1 ampule, Q4H PRN  sodium chloride flush, 5-40 mL, PRN  sodium chloride, , PRN  ondansetron, 4 mg, Q8H PRN   Or  ondansetron, 4 mg, Q6H PRN  polyethylene glycol, 17 g, Daily PRN  acetaminophen, 650 mg, Q6H PRN   Or  acetaminophen, 650 mg, Q6H PRN          Data:  CBC:   Recent Labs     08/23/22  0656 08/24/22  0425 08/25/22  0553   WBC 8.0 11.3* 12.4*   HGB 10.1* 11.3* 11.0*   HCT 30.2* 32.8* 32.5*   MCV 95.7 94.8 93.8    281 380     BMP:   Recent Labs     08/23/22  0656 08/24/22  0425 08/25/22  0553    141 142   K 3.4* 3.5 3.1*    104 104   CO2 28 26 28   BUN 19 15 15   CREATININE <0.5* <0.5* <0.5*     LIVER PROFILE:   Recent Labs     08/23/22  0656 08/24/22  0425   AST 43* 33   ALT 53* 52*   BILITOT 0.5 0.5   ALKPHOS 190* 244*       CULTURES  Covid not detected  Resp cx strep pneumonia staph aureus  Strep pneumoniae - pneumococcal pneumonia     RADIOLOGY  XR CHEST PORTABLE   Final Result   Patchy infiltrates seen within the lungs bilaterally which may represent   multifocal pneumonia. XR CHEST PORTABLE   Final Result   Slightly improving aeration of the lungs in this intubated patient. XR CHEST PORTABLE   Final Result   No significant interval change in moderate left-sided airspace disease as   compared to prior. Trace bilateral pleural effusions or pleural thickening. CT ABDOMEN PELVIS W IV CONTRAST Additional Contrast? None   Final Result   1. No pulmonary embolism. 2. Multifocal airspace opacities most prominent in the left lower lobe. Pattern likely represents developing pneumonia. ARDS may be considered. 3. Distended gallbladder with no pattern of inflammation or biliary   dilatation. This may be due to fasting status or other systemic processes   described above. 4. Multiple cystic lesions within the pancreas that have developed or are   increased from a prior study in 2016.   Recommend follow-up imaging in 2 years   to ensure stability. 5. Advanced atherosclerotic changes of the aorta in the lower abdomen with   stenosis noted without occlusion. CT CHEST PULMONARY EMBOLISM W CONTRAST   Final Result   1. No pulmonary embolism. 2. Multifocal airspace opacities most prominent in the left lower lobe. Pattern likely represents developing pneumonia. ARDS may be considered. 3. Distended gallbladder with no pattern of inflammation or biliary   dilatation. This may be due to fasting status or other systemic processes   described above. 4. Multiple cystic lesions within the pancreas that have developed or are   increased from a prior study in 2016. Recommend follow-up imaging in 2 years   to ensure stability. 5. Advanced atherosclerotic changes of the aorta in the lower abdomen with   stenosis noted without occlusion. CT CERVICAL SPINE WO CONTRAST   Final Result   No acute abnormality of the cervical spine. CT HEAD WO CONTRAST   Final Result   No acute intracranial abnormality. XR CHEST PORTABLE   Final Result   1. Left-sided pneumonia. Recommend imaging follow-up to resolution. 2. Devices in place as above. Shameka Potts MD have reviewed the chart on [facility] and personally interviewed and examined patient, reviewed the data (labs and imaging) and after discussion with my PA formulated the plan. Agree with note with the following edits. HPI:     Chart briefly reviewed. Patient is nonverbal.  She was transferred from the ICU. She has a history of diabetes, hypertension, CVA, hyperlipidemia. She is admitted the ICU with respiratory distress. She was diagnosed with severe sepsis. She was on mechanical ventilation. She has been weaned off the Levophed and she was extubated. She is also found to have rapid A. fib. She is started on amiodarone, digoxin and heparin drip.   She had issues with thrombocytopenia and her heparin was monitor at discharge.  -> Thrombocytopenia now, rule out HIT.   Heparin drip stopped and patient was started on Arixtra  on 8/22 for anticoagulation     Thrombocytopenia  - HIT antibody positive, MICHAEL pending   -Stopped heparin.   -Monitor platelet count--> remaining stable      Hypokalemia   -mild 3.1 today   -replacement protocol prn     DM 2  - holding home meds, currently on ssi     Acute renal failure  - sec to sepsis- likely ATN  - improved with IVF, continue IVF     Advanced dementia  -Limited code  But family does want reintubation if needed ; no CPR no defib      Hypokalemia   - repleted     Dysphagia  -Speech therapy following daily  Still n.p.o., with pleasure feeds  -was on TF        DVT Prophylaxis: arixtra   Diet: Diet NPO  Code Status: Limited    BRIA Mcdowell  08/25/22  10:40 AM      Jian Nicolas MD 8/25/2022 11:29 AM

## 2022-08-26 LAB
ANION GAP SERPL CALCULATED.3IONS-SCNC: 6 MMOL/L (ref 3–16)
BUN BLDV-MCNC: 21 MG/DL (ref 7–20)
CALCIUM SERPL-MCNC: 7.9 MG/DL (ref 8.3–10.6)
CHLORIDE BLD-SCNC: 107 MMOL/L (ref 99–110)
CO2: 28 MMOL/L (ref 21–32)
CREAT SERPL-MCNC: <0.5 MG/DL (ref 0.6–1.2)
GFR AFRICAN AMERICAN: >60
GFR NON-AFRICAN AMERICAN: >60
GLUCOSE BLD-MCNC: 111 MG/DL (ref 70–99)
GLUCOSE BLD-MCNC: 128 MG/DL (ref 70–99)
GLUCOSE BLD-MCNC: 141 MG/DL (ref 70–99)
GLUCOSE BLD-MCNC: 143 MG/DL (ref 70–99)
GLUCOSE BLD-MCNC: 147 MG/DL (ref 70–99)
GLUCOSE BLD-MCNC: 197 MG/DL (ref 70–99)
GLUCOSE BLD-MCNC: 92 MG/DL (ref 70–99)
HCT VFR BLD CALC: 26.3 % (ref 36–48)
HEMOGLOBIN: 8.7 G/DL (ref 12–16)
MCH RBC QN AUTO: 31.7 PG (ref 26–34)
MCHC RBC AUTO-ENTMCNC: 33.1 G/DL (ref 31–36)
MCV RBC AUTO: 95.8 FL (ref 80–100)
PDW BLD-RTO: 17.6 % (ref 12.4–15.4)
PERFORMED ON: ABNORMAL
PERFORMED ON: NORMAL
PLATELET # BLD: 423 K/UL (ref 135–450)
PMV BLD AUTO: 7.6 FL (ref 5–10.5)
POTASSIUM SERPL-SCNC: 3.2 MMOL/L (ref 3.5–5.1)
RBC # BLD: 2.74 M/UL (ref 4–5.2)
SODIUM BLD-SCNC: 141 MMOL/L (ref 136–145)
WBC # BLD: 16.1 K/UL (ref 4–11)

## 2022-08-26 PROCEDURE — 99233 SBSQ HOSP IP/OBS HIGH 50: CPT | Performed by: INTERNAL MEDICINE

## 2022-08-26 PROCEDURE — 94761 N-INVAS EAR/PLS OXIMETRY MLT: CPT

## 2022-08-26 PROCEDURE — 2700000000 HC OXYGEN THERAPY PER DAY

## 2022-08-26 PROCEDURE — 85027 COMPLETE CBC AUTOMATED: CPT

## 2022-08-26 PROCEDURE — 2060000000 HC ICU INTERMEDIATE R&B

## 2022-08-26 PROCEDURE — 80048 BASIC METABOLIC PNL TOTAL CA: CPT

## 2022-08-26 PROCEDURE — 6360000002 HC RX W HCPCS

## 2022-08-26 PROCEDURE — 6360000002 HC RX W HCPCS: Performed by: INTERNAL MEDICINE

## 2022-08-26 PROCEDURE — 92526 ORAL FUNCTION THERAPY: CPT

## 2022-08-26 RX ADMIN — Medication: at 20:51

## 2022-08-26 RX ADMIN — POTASSIUM CHLORIDE 10 MEQ: 7.46 INJECTION, SOLUTION INTRAVENOUS at 09:58

## 2022-08-26 RX ADMIN — POTASSIUM CHLORIDE 10 MEQ: 7.46 INJECTION, SOLUTION INTRAVENOUS at 13:40

## 2022-08-26 RX ADMIN — POTASSIUM CHLORIDE 10 MEQ: 7.46 INJECTION, SOLUTION INTRAVENOUS at 10:46

## 2022-08-26 RX ADMIN — FONDAPARINUX SODIUM 7.5 MG: 7.5 INJECTION, SOLUTION SUBCUTANEOUS at 09:29

## 2022-08-26 RX ADMIN — Medication: at 09:31

## 2022-08-26 RX ADMIN — POTASSIUM CHLORIDE 10 MEQ: 7.46 INJECTION, SOLUTION INTRAVENOUS at 10:05

## 2022-08-26 ASSESSMENT — PAIN SCALES - WONG BAKER
WONGBAKER_NUMERICALRESPONSE: 0

## 2022-08-26 NOTE — CARE COORDINATION
ROEL spoke with pt's son, Gaye Layne ([phone]) and he states that pt is coming [facility] and that he would like pt to go to [facility] [address] [place] [address] ([facility]). Pt is non-verbal.     ROEL called Isatu Kruger with [facility] and he states +Bed and pt can go there. Isatu Kruger with T states  that he has already spoken to [employer] [facility] [employer]. Isatu Kruger states he will get the info from [employer], including the transfer of the HENS and LOC. Isatu Kruger is aware that pt may d/c n 8/28/2022. Pt will only need a rapid covid at d/c.

## 2022-08-26 NOTE — PROGRESS NOTES
kg), 101.8 % IBW. Weight Source: Bed Scale  Current BMI (kg/m2): 23.3  Usual Body Weight: 131 lb (59.4 kg) (obtained on 8/17/22; actual weight)  % Weight Change (Calculated): 1  Weight Adjustment For: No Adjustment                 BMI Categories: Normal Weight (BMI 18.5-24. 9)    Estimated Daily Nutrient Needs:  Energy Requirements Based On: Kcal/kg  Weight Used for Energy Requirements: Current  Energy (kcal/day): 1334 - 1450 kcals based on 23-25 kcals/kg/CBW  Weight Used for Protein Requirements: Current  Protein (g/day): 81 - 93 g protein based on 1.4-1.6 g/kg/CBW  Method Used for Fluid Requirements: 1 ml/kcal  Fluid (ml/day): 1334 - 1450 ml    Nutrition Diagnosis:   Inadequate oral intake related to inadequate protein-energy intake, impaired respiratory function, cognitive or neurological impairment as evidenced by NPO or clear liquid status due to medical condition, poor intake prior to admission, wounds, mild loss of subcutaneous fat, mild muscle loss, lab values    Nutrition Interventions:   Food and/or Nutrient Delivery: Start Oral Diet, Start Oral Nutrition Supplement  Nutrition Education/Counseling: No recommendation at this time  Coordination of Nutrition Care: Continue to monitor while inpatient  Plan of Care discussed with: RN    Goals:  Previous Goal Met: Goal(s) Achieved  Goals: PO intake 50% or greater, by next RD assessment  Specify Other Goals: patient will remain in NPO status until medically cleared to receive nutrition therapy    Nutrition Monitoring and Evaluation:   Behavioral-Environmental Outcomes: None Identified  Food/Nutrient Intake Outcomes: Diet Advancement/Tolerance, Food and Nutrient Intake, Supplement Intake  Physical Signs/Symptoms Outcomes: Biochemical Data, Nutrition Focused Physical Findings, Weight, Chewing or Swallowing, Diarrhea    Discharge Planning:     Too soon to determine     Brenda Lozano, [address],   Contact: [phone]

## 2022-08-26 NOTE — FLOWSHEET NOTE
08/26/22 1445   Vitals   Temp 99.1 °F (37.3 °C)   Temp Source Oral   Heart Rate 76   Heart Rate Source Monitor   Resp 21   BP 99/68   BP Location Right upper arm   BP Method Automatic   Patient Position Semi fowlers   Level of Consciousness 0   MEWS Score 3   Oxygen Therapy   SpO2 96 %   O2 Device High flow nasal cannula   O2 Flow Rate (L/min) 5 L/min   Patient in bed awake. Vitals stable. Patient repositioned in bed. Venelex applied to sacrum. No further issues noted at this time,call light within reach and bed alarm on.

## 2022-08-26 NOTE — PLAN OF CARE
Problem: Discharge Planning  Goal: Discharge to home or other facility with appropriate resources  Outcome: Progressing     Problem: Pain  Goal: Verbalizes/displays adequate comfort level or baseline comfort level  Outcome: Progressing  Flowsheets (Taken 8/26/2022 1616)  Verbalizes/displays adequate comfort level or baseline comfort level: Assess pain using appropriate pain scale     Problem: Chronic Conditions and Co-morbidities  Goal: Patient's chronic conditions and co-morbidity symptoms are monitored and maintained or improved  Outcome: Progressing     Problem: Skin/Tissue Integrity  Goal: Absence of new skin breakdown  Description: 1. Monitor for areas of redness and/or skin breakdown  2. Assess vascular access sites hourly  3. Every 4-6 hours minimum:  Change oxygen saturation probe site  4. Every 4-6 hours:  If on nasal continuous positive airway pressure, respiratory therapy assess nares and determine need for appliance change or resting period.   Outcome: Progressing

## 2022-08-26 NOTE — PROGRESS NOTES
Pt in bed with eyes closed. Vitals stable. Oriented times 4. Shift assessment completed, see flow sheet. Pt is nonverbal but not not appear to be in distress. Scheduled meds given, see MAR. Pt repositioned and tucked in. Bed in low position, alarm on. John Ji RN  .

## 2022-08-26 NOTE — PROGRESS NOTES
Pulmonary & Critical Care Medicine ICU Progress Note    CC: Acute respiratory failure    Events of Last 24 hours:   Alert, confused    Invasive Lines: 8/17/2022 right femoral CVC in ER    MV: 8/17/2022-8/20/22    IV:   dextrose      sodium chloride       Vitals:  Blood pressure (!) 92/56, pulse 88, temperature 97.3 °F (36.3 °C), temperature source Axillary, resp. rate 20, height 5' 6\" (1.676 m), weight 144 lb 3.2 oz (65.4 kg), SpO2 92 %. on 6L   Constitutional:  No acute distress. Eyes: PERRL. Conjunctivae anicteric. ENT: Normal nose. Normal tongue. Neck:  Trachea is midline. No thyroid tenderness. Respiratory: No accessory muscle usage. No wheezes. No rales. No Rhonchi. Cardiovascular: Normal S1S2. No digit clubbing. No digit cyanosis. No LE edema. Gastrointestinal: No mass palpated. No tenderness palpated. Psychiatric: Lethargic and not following commands.       Scheduled Meds:   fondaparinux  7.5 mg SubCUTAneous Daily    Venelex   Topical BID    sodium chloride flush  5-40 mL IntraVENous 2 times per day    insulin lispro  0-4 Units SubCUTAneous Q4H     PRN Meds:  potassium chloride **OR** potassium alternative oral replacement **OR** potassium chloride, magnesium sulfate, glucose, dextrose bolus **OR** dextrose bolus, glucagon (rDNA), dextrose, ipratropium-albuterol, sodium chloride flush, sodium chloride, ondansetron **OR** ondansetron, polyethylene glycol, acetaminophen **OR** acetaminophen    Results:  CBC:   Recent Labs     08/24/22  0425 08/25/22  0553 08/26/22  0515   WBC 11.3* 12.4* 16.1*   HGB 11.3* 11.0* 8.7*   HCT 32.8* 32.5* 26.3*   MCV 94.8 93.8 95.8    380 423       BMP:   Recent Labs     08/24/22 0425 08/25/22  0553 08/26/22  0515    142 141   K 3.5 3.1* 3.2*    104 107   CO2 26 28 28   BUN 15 15 21*   CREATININE <0.5* <0.5* <0.5*       LIVER PROFILE:   Recent Labs     08/24/22  0425   AST 33   ALT 52*   BILITOT 0.5   ALKPHOS 244*       Cultures:  8/17/2022 SARS-CoV-2 and influenza are negative  2022 blood sent  2022 streptococcal pneumonia antigen is positive  2022 tracheal aspirate MRSA (LEATHA 1) and streptococcal pneumonia;      Imagin2022 C-spine no acute abnormality  2022 chest CT left greater than right lower lobe predominant airspace disease  2022 abdominal CT gallbladder distention, cystic pancreatic lesions, atherosclerotic disease of aorta    2022 CXR left-sided airspace disease    ECHO 19 with EF 60-65%, Grade I DD     ASSESSMENT:  Acute hypoxemic respiratory failure   Septic shock resolved  Acute kidney failure has resolved  A. fib RVR, has converted to sinus bradycardia  Streptococcal pneumoniae pneumonia, respiratory culture also with MRSA  Dementia of the Alzheimer's type  Diabetes  Thrombocytopenia      PLAN:  Supplemental oxygen to maintain SaO2 >92%; wean as tolerated    Completed 7 days CTX/Vanc  Arixtra until able to give DOAC  MICHAEL pending with + HIT

## 2022-08-26 NOTE — PROGRESS NOTES
Hospitalist Progress Note      PCP: No primary care provider on file. Date of Admission: 8/17/2022    Subjective: slightly more awake today to trial PO diet    Medications:  Reviewed    Infusion Medications    dextrose      sodium chloride       Scheduled Medications    fondaparinux  7.5 mg SubCUTAneous Daily    Venelex   Topical BID    sodium chloride flush  5-40 mL IntraVENous 2 times per day    insulin lispro  0-4 Units SubCUTAneous Q4H     PRN Meds: potassium chloride **OR** potassium alternative oral replacement **OR** potassium chloride, magnesium sulfate, glucose, dextrose bolus **OR** dextrose bolus, glucagon (rDNA), dextrose, ipratropium-albuterol, sodium chloride flush, sodium chloride, ondansetron **OR** ondansetron, polyethylene glycol, acetaminophen **OR** acetaminophen      Intake/Output Summary (Last 24 hours) at 8/26/2022 1952  Last data filed at 8/26/2022 0418  Gross per 24 hour   Intake --   Output 225 ml   Net -225 ml       Physical Exam Performed:    BP 99/68   Pulse 76   Temp 99.1 °F (37.3 °C) (Oral)   Resp 21   Ht 5' 6\" (1.676 m)   Wt 144 lb 3.2 oz (65.4 kg)   SpO2 (!) 85%   BMI 23.27 kg/m²     Gen: No distress. Alert. Elderly female   Eyes: PERRL. No sclera icterus. No conjunctival injection. Neck: No JVD. Trachea midline. Resp: No accessory muscle use. No crackles. No wheezes. No rhonchi. +Diminished breath sounds bilaterally   CV: Regular rate. Regular rhythm. No murmur. No rub. No edema. Peripheral Pulses: +2 palpable, equal bilaterally   GI: Non-tender. Non-distended. Normal bowel sounds. Skin: Warm and dry. No nodule on exposed extremities. No rash on exposed extremities. M/S: No cyanosis. No joint deformity. No clubbing. Neuro: Awake.  Grossly nonfocal. Minimally responding, moaning   Psych: No anxiety or agitation    Labs:   Recent Labs     08/24/22  0425 08/25/22  0553 08/26/22  0515   WBC 11.3* 12.4* 16.1*   HGB 11.3* 11.0* 8.7*   HCT 32.8* 32.5* 26.3*  380 423     Recent Labs     08/24/22  0425 08/25/22  0553 08/26/22  0515    142 141   K 3.5 3.1* 3.2*    104 107   CO2 26 28 28   BUN 15 15 21*   CREATININE <0.5* <0.5* <0.5*   CALCIUM 8.4 8.5 7.9*     Recent Labs     08/24/22  0425   AST 33   ALT 52*   BILITOT 0.5   ALKPHOS 244*     No results for input(s): INR in the last 72 hours. No results for input(s): Rosalie Cobos in the last 72 hours. Urinalysis:      Lab Results   Component Value Date/Time    NITRU POSITIVE 08/17/2022 12:30 PM    WBCUA 6-9 08/17/2022 12:30 PM    BACTERIA 1+ 08/17/2022 12:30 PM    RBCUA 5-10 08/17/2022 12:30 PM    BLOODU MODERATE 08/17/2022 12:30 PM    SPECGRAV >=1.030 08/17/2022 12:30 PM    GLUCOSEU 100 08/17/2022 12:30 PM       Radiology:  XR CHEST PORTABLE   Final Result   Patchy infiltrates seen within the lungs bilaterally which may represent   multifocal pneumonia. XR CHEST PORTABLE   Final Result   Slightly improving aeration of the lungs in this intubated patient. XR CHEST PORTABLE   Final Result   No significant interval change in moderate left-sided airspace disease as   compared to prior. Trace bilateral pleural effusions or pleural thickening. CT ABDOMEN PELVIS W IV CONTRAST Additional Contrast? None   Final Result   1. No pulmonary embolism. 2. Multifocal airspace opacities most prominent in the left lower lobe. Pattern likely represents developing pneumonia. ARDS may be considered. 3. Distended gallbladder with no pattern of inflammation or biliary   dilatation. This may be due to fasting status or other systemic processes   described above. 4. Multiple cystic lesions within the pancreas that have developed or are   increased from a prior study in 2016. Recommend follow-up imaging in 2 years   to ensure stability. 5. Advanced atherosclerotic changes of the aorta in the lower abdomen with   stenosis noted without occlusion.          CT CHEST PULMONARY EMBOLISM W CONTRAST   Final Result   1. No pulmonary embolism. 2. Multifocal airspace opacities most prominent in the left lower lobe. Pattern likely represents developing pneumonia. ARDS may be considered. 3. Distended gallbladder with no pattern of inflammation or biliary   dilatation. This may be due to fasting status or other systemic processes   described above. 4. Multiple cystic lesions within the pancreas that have developed or are   increased from a prior study in 2016. Recommend follow-up imaging in 2 years   to ensure stability. 5. Advanced atherosclerotic changes of the aorta in the lower abdomen with   stenosis noted without occlusion. CT CERVICAL SPINE WO CONTRAST   Final Result   No acute abnormality of the cervical spine. CT HEAD WO CONTRAST   Final Result   No acute intracranial abnormality. XR CHEST PORTABLE   Final Result   1. Left-sided pneumonia. Recommend imaging follow-up to resolution. 2. Devices in place as above. Assessment/Plan:    Active Hospital Problems    Diagnosis     Atrial fibrillation with RVR ([facility]) [I48.91]      Priority: Medium    NEELIMA (acute kidney injury) ([address] [N17.9]      Priority: Medium    Acute respiratory failure with hypoxia ([facility]) [J96.01]      Priority: Medium    Moderate protein-calorie malnutrition ([address] [E44.0]      Priority: Medium    Acute renal failure with acute cortical necrosis ([facility]) [N17.1]      Priority: Medium    Multifocal pneumonia [J18.9]      Priority: Medium    Acute hypoxemic respiratory failure ([address] [J96.01]      Priority: Medium    Septic shock ([address] [A41.9, R65.21]      Priority: Medium         Pneumococcal pneumonia   - imaging with Multifocal pneumonia- cx with strep pneumo   - started on cefepime and vanc-cefepime switched to Rocephin . Continue vancomycin and Rocephin , day 7 of 7 .   Course of antibiotics completed yesterday   - blood cx remain neg   -S/p mechanical ventilation --> off vent, now on 4 L O2   Seen by pulmonary critical care     Acute hypoxemic resp failure  - sec to pneumonia  - started on vent support by EMS at field  - vent mx per pulmonary  S/p mechanical ventilation, patient extubated 8/20 . Remains on high flow oxygen, 10 L-> 6L-> 9L-> 8L-- 4 L O2 today ;  wean O2 as tolerated. Patient is limited code, but family requesting reintubation if needed     Sepsis- severe with shock  - hypotensive despite IVF boluses ; was requiring pressors Levophed and vasopressin .   - S/P stress dose steroids  - Off pressors now . - continue IVF  Blood pressure stable     Afibb with RVR   - converted to NSR with amio and digoxin  - Cardiology consulted  -TSH wnl  - [id number] Score of 4; anticoagulation indicated. -Patient started on heparin drip, per cardiology recommendations to switch to NOAC when appropriate and set up a cardiac monitor at discharge.  -> Thrombocytopenia now, rule out HIT. Heparin drip stopped and patient was started on Arixtra  on 8/22 for anticoagulation     Thrombocytopenia  - HIT antibody positive, MICHAEL pending -->not yet back  -Stopped heparin.   -Monitor platelet count--> remaining stable      Hypokalemia   -mild 3.1 today   -replacement protocol prn      DM 2  - holding home meds, currently on ssi     Acute renal failure - resolved  - sec to sepsis- likely ATN  - improved with IVF, dc IVF     Advanced dementia  -Limited code  But family does want reintubation if needed ; no CPR no defib      Hypokalemia   - repleted     Dysphagia  -Speech therapy following daily  Still n.p.o., with pleasure feeds  -was on TF via [address] [facility]  - now [address] removed. Start oral diet per SLP  - if nutrition inadequate - might consider NG and start feeds          DVT Prophylaxis: arixtra   Diet: ADULT DIET;  Dysphagia - Pureed  ADULT ORAL NUTRITION SUPPLEMENT; Lunch, Dinner; Frozen Oral Supplement  Code Status: Limited    PT/OT Eval Status: ordered    Dispo - cont care, re-eval nutrition via PO if not might need Jarrod Shook MD

## 2022-08-26 NOTE — PROGRESS NOTES
Speech Language Pathology  Dysphagia Treatment/Follow-Up Note  Facility/Department: [facility]    Recommendations: IDDSI 4 Puree Solids; IDDSI 0 Thin Liquids- straws OK; Meds crushed in puree as able. **pt appeared to be fatiguing with PO intake (increased time to initiate swallow for all PO towards end of session); pt will likely benefit from smaller meals, more frequently. Pt also benefits from verbal and tactile cues to \"open mouth wide\" and \"take a sip:\"    Risk Management: upright for all intake, stay upright for at least 30 mins after intake, small bites/sips, total feed,oral care 2-3x/day to reduce adverse affects in the event of aspiration, increase physical mobility as able, alternate bites/sips, slow rate of intake, general aspiration precautions, and hold PO and contact SLP if s/s of aspiration or worsening respiratory status develop. Recommend use of thermal-gustatory stimulation (really cold, hot, sweet, sour bolus) to promote improved communication between peripheral nervous system and central nervous system. This should help the patient recognize task and improve safety with PO intake. Suspect limited PO intake    NAME:Shilpa Reece  : 1940 (80 y.o.)   MRN: [id number]  ROOM: [place] [address]  ADMISSION DATE: 2022  PATIENT DIAGNOSIS(ES): Multifocal pneumonia [J18.9]  Allergies   Allergen Reactions    Avandia [Rosiglitazone Maleate]     Codeine      Per son Codeine caused patient to go into cardiac arrest    Glucophage [Metformin Hydrochloride]      Lactic acidosis      Hydrochlorothiazide     Tramadol        DATE ONSET: 2022    Pain: The patient does not complain of pain- pt grossly non-verbal       Current Diet: Diet NPO per diet orders. Per SLP note from previous session, recommended initiating pureed solids, thin liquids, meds crushed in puree    Diet Tolerance:  Pt still currently NPO.       Dysphagia Treatment and Impressions:  Subjective: Pt seen in room at bedside with RN permission. Reports concern re: adequate nutrition / need for alt means. RN Report/Chart Review: Pt awake and cooperative, but does remain non-verbal. Pt upright in bed   Patient tolerance: Pt is NPO per diet orders. SLP had recommended initiating puree and thin liquids previous date     Respiratory Status: Pt with SPO2% of 92-94 on 5 LPM HFNC with RR of 16    Liquid PO Trials:   IDDSI 0 Thin:  Assessed via ice chips and straw: no anterior bolus loss, but pt did benefit from verbal cues to \"take a sip\" via the straw. Occ bolus holding towards end of PO trials, but suspect grossly functional A-P transit. Audible swallow; suspect rapid spillover BOT. Pt taking consecutive sips. No overt s/s of aspiration - no coughing, throat clearing, wet vocal quality, change in vitals. Solid PO Trials  IDDSI 4 Puree:   utilized gustatory stim via cold and sweet bolus to promote improved sensory awareness. Pt would often barely open oral cavity, and then close oral cavity on anterior tip of spoon. Pt benefited from verbal; cues to \"open mouth wide\" and tactile cue of placing spoon to lips. There was no anterior bolus loss. Suspect reduced/impaired A-P bolus transit - pt required increased time. Swallow did appear timely with no overt s/s of aspiration, although at times SpO2 decreased to 88-89 (but then quickly recovered). Occ oral stasis noted post-swallow - benefited from liquid wash. Occ bolus holding at end of PO trials - suspect pt fatiguing. Education: SLP edu pt re: Role of SLP, Rationale for dysphagia tx, Recommended compensatory strategies, Aspiration precautions, and POC. Pt no evidence of learning. RN aware of recommendations  Assessment: Pt grossly tolerating puree and thin liquids with no clinical s/s of aspiration. Poor carryover of recommended compensatory strategies. Suspect pt is fatiguing with PO trials - time for pt to initiate swallow increased towards end of session.  Suspect pt will benefit from smaller meals, more frequently. Pt also benefits from verbal and tactile cues to open oral cavity, accept PO, and occ needed to initiate a swallow   Recommend use of thermal-gustatory stimulation (really cold, hot, sweet, sour bolus) to promote improved communication between peripheral nervous system and central nervous system. This should help the patient recognize task and improve safety with PO intake. Suspect limited PO intake    Recommendations: SLP recommends IDDSI 4 Puree Solids; IDDSI 0 Thin Liquids- straws OK; Meds crushed in puree as able  *pt appeared to be fatiguing with PO intake (increased time to initiate swallow for all PO towards end of session); pt will likely benefit from smaller meals, more frequently. Pt also benefits from verbal and tactile cues to \"open mouth wide\" and \"take a sip:\"    Risk Management: upright for all intake, stay upright for at least 30 mins after intake, small bites/sips, total feed,oral care 2-3x/day to reduce adverse affects in the event of aspiration, increase physical mobility as able, alternate bites/sips, slow rate of intake, general aspiration precautions, and hold PO and contact SLP if s/s of aspiration or worsening respiratory status develop. Recommend use of thermal-gustatory stimulation (really cold, hot, sweet, sour bolus) to promote improved communication between peripheral nervous system and central nervous system. This should help the patient recognize task and improve safety with PO intake. Suspect limited PO intake    Dysphagia Goals:  Goals:  Short Term Goals:  Timeframe for Short Term Goals: (5 days 08/28/2022)  Goal 1: The patient will tolerate repeat BSE when able for ongoing assessment  8/26/2022 : Goal addressed, see above. Ongoing, progressing. Goal 2: The patient will tolerate instrumental assessment when able   8/26/2022 : Goal addressed, no appropriate at this time. Ongoing, progressing.     Goal 3: The patient will tolerate recommended diet with no clinical s/s of aspiration 5/5 8/26/2022 : Goal addressed, see above. Ongoing, progressing. Goal 4: The patient will tolerate therapeutic diet upgrade trials with no clinical s/s of aspiration 5/5 8/26/2022 : Goal addressed, see above. Ongoing, progressing. Long Term Goals:   Timeframe for Long Term Goals: (7 days 08/30/2022)  Goal 1: The patient will tolerate least restrictive diet with no clinical s/s of aspiration or worsening respiratory/pulmonary status  8/26/2022 : Ongoing, progressing. Speech/Language/Cog Goals: N/A     Recommendations:  Solid Consistency: puree  Liquid Consistency: thin liquids-straws OK  Medication: Meds crushed in puree as able    Plan:    Continued Dysphagia treatment with goals per plan of care. Discharge Recommendations: TBD    If pt discharges from hospital prior to Speech/Swallowing discharge, this note serves as tx and discharge summary.      Total Treatment Time / Charges     Time in Time out Total Time / units   Cognitive Tx         Speech Tx      Dysphagia Tx 1140 1205 25 min / 1 unit     Signature:  Andre Hayes MA [id number] [facility]  Speech Language Pathologist

## 2022-08-26 NOTE — FLOWSHEET NOTE
08/26/22 1930   Oxygen Therapy   SpO2 (!) 83 %   O2 Device High flow nasal cannula   O2 Flow Rate (L/min) 5 L/min   Pt awake and alert. Monitor tech called about low 02 sat. 02 increased to 8 liters per high flow cannula, pt is in no distress but still about 83-84%. Increased 02 to 10 liters and called RT adrianna. He said he would be in to see her.  Gin Rocha RN

## 2022-08-26 NOTE — FLOWSHEET NOTE
08/26/22 0930   Vitals   Temp 97.3 °F (36.3 °C)   Temp Source Axillary   Heart Rate 88   Heart Rate Source Monitor   Resp 20   BP (!) 92/56   BP Location Right upper arm   BP Method Automatic   Patient Position Semi fowlers   Level of Consciousness 0   MEWS Score 2   Oxygen Therapy   SpO2 92 %   O2 Device High flow nasal cannula   O2 Flow Rate (L/min) 5 L/min   Shift assessment completed-see flow sheet. Patient in bed awake. Nonverbal. Will make eye contact with RN. Vitals obtained. Patient has no signs of distress at this time. Morning medications given per order. Venelex applied to sacrum and patient repositioned. No issues noted at this time,call light within reach and bed alarm on.

## 2022-08-27 LAB
ANION GAP SERPL CALCULATED.3IONS-SCNC: 6 MMOL/L (ref 3–16)
BUN BLDV-MCNC: 19 MG/DL (ref 7–20)
CALCIUM SERPL-MCNC: 8.1 MG/DL (ref 8.3–10.6)
CHLORIDE BLD-SCNC: 109 MMOL/L (ref 99–110)
CO2: 28 MMOL/L (ref 21–32)
CREAT SERPL-MCNC: <0.5 MG/DL (ref 0.6–1.2)
GFR AFRICAN AMERICAN: >60
GFR NON-AFRICAN AMERICAN: >60
GLUCOSE BLD-MCNC: 122 MG/DL (ref 70–99)
GLUCOSE BLD-MCNC: 65 MG/DL (ref 70–99)
GLUCOSE BLD-MCNC: 70 MG/DL (ref 70–99)
GLUCOSE BLD-MCNC: 77 MG/DL (ref 70–99)
GLUCOSE BLD-MCNC: 80 MG/DL (ref 70–99)
GLUCOSE BLD-MCNC: 83 MG/DL (ref 70–99)
GLUCOSE BLD-MCNC: 84 MG/DL (ref 70–99)
GLUCOSE BLD-MCNC: 89 MG/DL (ref 70–99)
GLUCOSE BLD-MCNC: 91 MG/DL (ref 70–99)
HCT VFR BLD CALC: 23.8 % (ref 36–48)
HEMOGLOBIN: 8 G/DL (ref 12–16)
MCH RBC QN AUTO: 32.3 PG (ref 26–34)
MCHC RBC AUTO-ENTMCNC: 33.6 G/DL (ref 31–36)
MCV RBC AUTO: 96.2 FL (ref 80–100)
PDW BLD-RTO: 17.5 % (ref 12.4–15.4)
PERFORMED ON: ABNORMAL
PERFORMED ON: ABNORMAL
PERFORMED ON: NORMAL
PLATELET # BLD: 312 K/UL (ref 135–450)
PMV BLD AUTO: 7.4 FL (ref 5–10.5)
POTASSIUM SERPL-SCNC: 3.6 MMOL/L (ref 3.5–5.1)
RBC # BLD: 2.47 M/UL (ref 4–5.2)
SODIUM BLD-SCNC: 143 MMOL/L (ref 136–145)
WBC # BLD: 11.9 K/UL (ref 4–11)

## 2022-08-27 PROCEDURE — 99233 SBSQ HOSP IP/OBS HIGH 50: CPT | Performed by: INTERNAL MEDICINE

## 2022-08-27 PROCEDURE — 85027 COMPLETE CBC AUTOMATED: CPT

## 2022-08-27 PROCEDURE — 2500000003 HC RX 250 WO HCPCS: Performed by: INTERNAL MEDICINE

## 2022-08-27 PROCEDURE — 94761 N-INVAS EAR/PLS OXIMETRY MLT: CPT

## 2022-08-27 PROCEDURE — 80048 BASIC METABOLIC PNL TOTAL CA: CPT

## 2022-08-27 PROCEDURE — 6360000002 HC RX W HCPCS: Performed by: INTERNAL MEDICINE

## 2022-08-27 PROCEDURE — 2700000000 HC OXYGEN THERAPY PER DAY

## 2022-08-27 PROCEDURE — 2580000003 HC RX 258: Performed by: NURSE PRACTITIONER

## 2022-08-27 PROCEDURE — 2060000000 HC ICU INTERMEDIATE R&B

## 2022-08-27 RX ORDER — FUROSEMIDE 10 MG/ML
20 INJECTION INTRAMUSCULAR; INTRAVENOUS ONCE
Status: COMPLETED | OUTPATIENT
Start: 2022-08-27 | End: 2022-08-27

## 2022-08-27 RX ADMIN — FONDAPARINUX SODIUM 7.5 MG: 7.5 INJECTION, SOLUTION SUBCUTANEOUS at 10:32

## 2022-08-27 RX ADMIN — Medication 10 ML: at 20:51

## 2022-08-27 RX ADMIN — Medication: at 08:55

## 2022-08-27 RX ADMIN — Medication: at 20:52

## 2022-08-27 RX ADMIN — Medication 10 ML: at 08:56

## 2022-08-27 RX ADMIN — GLUCAGON HYDROCHLORIDE 1 MG: 1 INJECTION, POWDER, FOR SOLUTION INTRAMUSCULAR; INTRAVENOUS; SUBCUTANEOUS at 12:44

## 2022-08-27 RX ADMIN — FUROSEMIDE 20 MG: 10 INJECTION, SOLUTION INTRAMUSCULAR; INTRAVENOUS at 15:56

## 2022-08-27 ASSESSMENT — PAIN SCALES - WONG BAKER
WONGBAKER_NUMERICALRESPONSE: 0

## 2022-08-27 NOTE — PROGRESS NOTES
Hospitalist Progress Note      PCP: No primary care provider on file. Date of Admission: 8/17/2022    Subjective: awake and mostly non verbal for me today. Medications:  Reviewed    Infusion Medications    dextrose      sodium chloride       Scheduled Medications    furosemide  20 mg IntraVENous Once    fondaparinux  7.5 mg SubCUTAneous Daily    Venelex   Topical BID    sodium chloride flush  5-40 mL IntraVENous 2 times per day    insulin lispro  0-4 Units SubCUTAneous Q4H     PRN Meds: potassium chloride **OR** potassium alternative oral replacement **OR** potassium chloride, magnesium sulfate, glucose, dextrose bolus **OR** dextrose bolus, glucagon (rDNA), dextrose, ipratropium-albuterol, sodium chloride flush, sodium chloride, ondansetron **OR** ondansetron, polyethylene glycol, acetaminophen **OR** acetaminophen      Intake/Output Summary (Last 24 hours) at 8/27/2022 1548  Last data filed at 8/27/2022 0226  Gross per 24 hour   Intake 2870.06 ml   Output 250 ml   Net 2620.06 ml         Physical Exam Performed:    /78   Pulse 91   Temp 98.7 °F (37.1 °C) (Axillary)   Resp 16   Ht 5' 6\" (1.676 m)   Wt 145 lb 1.6 oz (65.8 kg)   SpO2 95%   BMI 23.42 kg/m²     Gen: No distress. Alert. Elderly female, diffuse anasarca  Eyes: PERRL. No sclera icterus. No conjunctival injection. Neck: No JVD. Trachea midline. Resp: No accessory muscle use. No crackles. No wheezes. No rhonchi. +Diminished breath sounds bilaterally   CV: Regular rate. Regular rhythm. No murmur. No rub. Peripheral Pulses: +2 palpable, equal bilaterally   GI: Non-tender. Non-distended. Normal bowel sounds. Skin: Warm and dry. No nodule on exposed extremities. No rash on exposed extremities. M/S: No cyanosis. No joint deformity. No clubbing. Neuro: Awake.  Grossly nonfocal. Minimally responding, moaning   Psych: No anxiety or agitation    Labs:   Recent Labs     08/25/22  0553 08/26/22  0515 08/27/22  0510   WBC 12.4* 16.1* 11.9*   HGB 11.0* 8.7* 8.0*   HCT 32.5* 26.3* 23.8*    423 312       Recent Labs     08/25/22  0553 08/26/22  0515 08/27/22  0510    141 143   K 3.1* 3.2* 3.6    107 109   CO2 28 28 28   BUN 15 21* 19   CREATININE <0.5* <0.5* <0.5*   CALCIUM 8.5 7.9* 8.1*       No results for input(s): AST, ALT, BILIDIR, BILITOT, ALKPHOS in the last 72 hours. No results for input(s): INR in the last 72 hours. No results for input(s): Kasi Shackle in the last 72 hours. Urinalysis:      Lab Results   Component Value Date/Time    NITRU POSITIVE 08/17/2022 12:30 PM    WBCUA 6-9 08/17/2022 12:30 PM    BACTERIA 1+ 08/17/2022 12:30 PM    RBCUA 5-10 08/17/2022 12:30 PM    BLOODU MODERATE 08/17/2022 12:30 PM    SPECGRAV >=1.030 08/17/2022 12:30 PM    GLUCOSEU 100 08/17/2022 12:30 PM       Radiology:  XR CHEST PORTABLE   Final Result   Patchy infiltrates seen within the lungs bilaterally which may represent   multifocal pneumonia. XR CHEST PORTABLE   Final Result   Slightly improving aeration of the lungs in this intubated patient. XR CHEST PORTABLE   Final Result   No significant interval change in moderate left-sided airspace disease as   compared to prior. Trace bilateral pleural effusions or pleural thickening. CT ABDOMEN PELVIS W IV CONTRAST Additional Contrast? None   Final Result   1. No pulmonary embolism. 2. Multifocal airspace opacities most prominent in the left lower lobe. Pattern likely represents developing pneumonia. ARDS may be considered. 3. Distended gallbladder with no pattern of inflammation or biliary   dilatation. This may be due to fasting status or other systemic processes   described above. 4. Multiple cystic lesions within the pancreas that have developed or are   increased from a prior study in 2016. Recommend follow-up imaging in 2 years   to ensure stability.    5. Advanced atherosclerotic changes of the aorta in the lower abdomen with stenosis noted without occlusion. CT CHEST PULMONARY EMBOLISM W CONTRAST   Final Result   1. No pulmonary embolism. 2. Multifocal airspace opacities most prominent in the left lower lobe. Pattern likely represents developing pneumonia. ARDS may be considered. 3. Distended gallbladder with no pattern of inflammation or biliary   dilatation. This may be due to fasting status or other systemic processes   described above. 4. Multiple cystic lesions within the pancreas that have developed or are   increased from a prior study in 2016. Recommend follow-up imaging in 2 years   to ensure stability. 5. Advanced atherosclerotic changes of the aorta in the lower abdomen with   stenosis noted without occlusion. CT CERVICAL SPINE WO CONTRAST   Final Result   No acute abnormality of the cervical spine. CT HEAD WO CONTRAST   Final Result   No acute intracranial abnormality. XR CHEST PORTABLE   Final Result   1. Left-sided pneumonia. Recommend imaging follow-up to resolution. 2. Devices in place as above. XR CHEST 1 VIEW    (Results Pending)           Assessment/Plan:    Active Hospital Problems    Diagnosis     Atrial fibrillation with RVR ([facility]) [I48.91]      Priority: Medium    NEELIMA (acute kidney injury) ([address] [N17.9]      Priority: Medium    Acute respiratory failure with hypoxia ([facility]) [J96.01]      Priority: Medium    Moderate protein-calorie malnutrition ([address] [E44.0]      Priority: Medium    Acute renal failure with acute cortical necrosis ([facility]) [N17.1]      Priority: Medium    Multifocal pneumonia [J18.9]      Priority: Medium    Acute hypoxemic respiratory failure ([address] [J96.01]      Priority: Medium    Septic shock ([address] [A41.9, R65.21]      Priority: Medium         Pneumococcal pneumonia   - imaging with Multifocal pneumonia- cx with MRSA and also strep pneumo   - started on cefepime and vanc-cefepime switched to Rocephin .   Continue vancomycin and Rocephin , day 7 of 7 .  Course of antibiotics completed. - blood cx remain neg   -S/p mechanical ventilation --> off vent  - oxygenation remains challenging. Seen by pulmonary critical care         Acute hypoxemic resp failure  Ongoing need for 8l/min O2   wean O2 as tolerated. Patient is limited code, but family requesting reintubation if needed       Sepsis- severe with shock  - hypotensive despite IVF boluses ; was requiring pressors Levophed and vasopressin .   - S/P stress dose steroids  - Off pressors now . Blood pressure stable       Afib with RVR   - converted to NSR with amio and digoxin  - Cardiology consulted  -TSH wnl  - [id number] Score of 4; anticoagulation indicated. -Patient started on heparin drip, per cardiology recommendations to switch to NOAC when appropriate and set up a cardiac monitor at discharge.  -> Thrombocytopenia now, rule out HIT. Heparin drip stopped and patient was started on Arixtra  on 8/22 for anticoagulation   - HIT positive, MICHAEL still pending. Thrombocytopenia  - HIT antibody positive, MICHAEL pending -->not yet back  -Stopped heparin.   -Monitor platelet count--> remaining stable        Hypokalemia   -mild 3.1 today   -replacement protocol prn        DM 2  - holding home meds, currently on ssi       Acute renal failure - resolved  - sec to sepsis- likely ATN  - improved with IVF, dc IVF       Advanced dementia  -Limited code  But family does want reintubation if needed ; no CPR no defib        Hypokalemia   - repleted       Dysphagia  -Speech therapy following daily  Still n.p.o., with pleasure feeds  -was on TF via [address]  - now [address] removed. Start oral diet per SLP  - if nutrition inadequate - might consider NG and start feeds            DVT Prophylaxis: arixtra   Diet: ADULT DIET; Dysphagia - Pureed  ADULT ORAL NUTRITION SUPPLEMENT; Lunch, Dinner; Frozen Oral Supplement  Code Status: Limited    PT/OT Eval Status: ordered    Dispo - cont care, prognosis very poor.        Dania Cushing Adalberto Omalley MD

## 2022-08-27 NOTE — PLAN OF CARE
Problem: Pain  Goal: Verbalizes/displays adequate comfort level or baseline comfort level  Outcome: Progressing     Problem: Nutrition Deficit:  Goal: Optimize nutritional status  Outcome: Not Progressing     Problem: Safety - Adult  Goal: Free from fall injury  Outcome: Progressing     Problem: Skin/Tissue Integrity  Goal: Absence of new skin breakdown  Description: 1. Monitor for areas of redness and/or skin breakdown  2. Assess vascular access sites hourly  3. Every 4-6 hours minimum:  Change oxygen saturation probe site  4. Every 4-6 hours:  If on nasal continuous positive airway pressure, respiratory therapy assess nares and determine need for appliance change or resting period.   Outcome: Progressing     Problem: Nutrition Deficit:  Goal: Optimize nutritional status  Outcome: Not Progressing

## 2022-08-27 NOTE — PROGRESS NOTES
Pulmonary & Critical Care Medicine ICU Progress Note    CC: Acute respiratory failure    Events of Last 24 hours: worse hypoxia today. No increase in cough and no observed aspiration per nursing. Alert, confused    Invasive Lines: 8/17/2022 right femoral CVC in ER    MV: 8/17/2022-8/20/22    IV:   dextrose      sodium chloride       Vitals:  Blood pressure 110/78, pulse 91, temperature 98.7 °F (37.1 °C), temperature source Axillary, resp. rate 16, height 5' 6\" (1.676 m), weight 145 lb 1.6 oz (65.8 kg), SpO2 95 %. on 10LL   Constitutional:  No acute distress. Eyes: PERRL. Conjunctivae anicteric. ENT: Normal nose. Normal tongue. Neck:  Trachea is midline. No thyroid tenderness. Respiratory: No accessory muscle usage. No wheezes. No rales. No Rhonchi. Cardiovascular: Normal S1S2. No digit clubbing. No digit cyanosis. No LE edema. Gastrointestinal: No mass palpated. No tenderness palpated. Psychiatric: Lethargic and not following commands.       Scheduled Meds:   fondaparinux  7.5 mg SubCUTAneous Daily    Venelex   Topical BID    sodium chloride flush  5-40 mL IntraVENous 2 times per day    insulin lispro  0-4 Units SubCUTAneous Q4H     PRN Meds:  potassium chloride **OR** potassium alternative oral replacement **OR** potassium chloride, magnesium sulfate, glucose, dextrose bolus **OR** dextrose bolus, glucagon (rDNA), dextrose, ipratropium-albuterol, sodium chloride flush, sodium chloride, ondansetron **OR** ondansetron, polyethylene glycol, acetaminophen **OR** acetaminophen    Results:  CBC:   Recent Labs     08/25/22  0553 08/26/22  0515 08/27/22  0510   WBC 12.4* 16.1* 11.9*   HGB 11.0* 8.7* 8.0*   HCT 32.5* 26.3* 23.8*   MCV 93.8 95.8 96.2    423 312       BMP:   Recent Labs     08/25/22  0553 08/26/22  0515 08/27/22  0510    141 143   K 3.1* 3.2* 3.6    107 109   CO2 28 28 28   BUN 15 21* 19   CREATININE <0.5* <0.5* <0.5*       LIVER PROFILE:   No results for input(s): AST, ALT, LIPASE, BILIDIR, BILITOT, ALKPHOS in the last 72 hours. Invalid input(s): AMYLASE,  ALB    Cultures:  2022 SARS-CoV-2 and influenza are negative  2022 blood sent  2022 streptococcal pneumonia antigen is positive  2022 tracheal aspirate MRSA (LEATHA 1) and streptococcal pneumonia;      Imagin2022 C-spine no acute abnormality  2022 chest CT left greater than right lower lobe predominant airspace disease  2022 abdominal CT gallbladder distention, cystic pancreatic lesions, atherosclerotic disease of aorta    2022 CXR left-sided airspace disease    ECHO 19 with EF 60-65%, Grade I DD     ASSESSMENT:  Acute hypoxemic respiratory failure   Septic shock resolved  Acute kidney failure has resolved  A. fib RVR, has converted to sinus bradycardia  Streptococcal pneumoniae pneumonia, respiratory culture also with MRSA  Dementia of the Alzheimer's type  Diabetes  Thrombocytopenia      PLAN:  Supplemental oxygen to maintain SaO2 >92%; wean as tolerated    Completed 7 days CTX/Vanc  Arixtra until able to give DOAC  MICHAEL pending with + HIT    Recommend removing CVC  Repeat CXR tomorrow

## 2022-08-27 NOTE — FLOWSHEET NOTE
08/27/22 0043   Vital Signs   Temp 98.2 °F (36.8 °C)   Temp Source Axillary   Heart Rate 96   Heart Rate Source Monitor   Resp 18   /79   BP Method Automatic   MAP (Calculated) 87.33   Patient Position Semi fowlers   Level of Consciousness 1   MEWS Score 2   Oxygen Therapy   SpO2 97 %   O2 Device High flow nasal cannula   O2 Flow Rate (L/min) 12 L/min   Height and Weight   Weight 145 lb 1.6 oz (65.8 kg)   Weight Method Actual;Bed scale   BMI (Calculated) 23.5   Pt resting in bed, no acute distress.  Soo Jolly RN

## 2022-08-27 NOTE — FLOWSHEET NOTE
08/26/22 2000   Vital Signs   Temp 98 °F (36.7 °C)   Temp Source Axillary   Heart Rate 99   Resp 18   /85   BP Location Right upper arm   MAP (Calculated) 90.67   Level of Consciousness 0   MEWS Score 1   Oxygen Therapy   SpO2 (!) 89 %   O2 Device High flow nasal cannula   O2 Flow Rate (L/min) 12 L/min   Increased 02 to 12 liter per HFNC. Pt was 86-88 on 10 liters HFNC. Currently now at 94% on 12 liters HFNC. Updated charge BALTA Rizo. Notified RT Gilma Sena. Pt in no resp distress. Resp easy/even.  Pedro Mcghee RN

## 2022-08-27 NOTE — PROGRESS NOTES
Shift assessment complete - See flow sheet. Medications given - See [facility]      Patient denies any further needs. Bed alarm is set for patient safety, call light in reach, pt not showing any signs of distress or needs. Pt fed breakfast this morning      Pt FA bilat are swollen near the bends of the arm, the right side is worse, will make MD aware.

## 2022-08-27 NOTE — PROGRESS NOTES
Pt fs 70, pt was given juice and was eating pudding and ice cream the her family was feeding her. Will recheck to make sure she id not droppping.

## 2022-08-27 NOTE — PROGRESS NOTES
Pt resting with eyes closed  Respirs witnessed as e/e, no signs of distress. SR up x2, bed in lowest  position  Wheels locked  Bed alarm on/ Call light and bedside table in easy reach.    Meenu Johnson RN

## 2022-08-27 NOTE — PROGRESS NOTES
Pt alert and oriented to baseline. SR up x2,  Call light and bedside table within easy reach. Denies any needs at this time. Bedside report given to Yamilex , [facility]. Care transferred.

## 2022-08-28 ENCOUNTER — APPOINTMENT (OUTPATIENT)
Dept: GENERAL RADIOLOGY | Age: 82
DRG: 871 | End: 2022-08-28
Payer: MEDICARE

## 2022-08-28 LAB
ANION GAP SERPL CALCULATED.3IONS-SCNC: 6 MMOL/L (ref 3–16)
BUN BLDV-MCNC: 18 MG/DL (ref 7–20)
CALCIUM SERPL-MCNC: 7.8 MG/DL (ref 8.3–10.6)
CHLORIDE BLD-SCNC: 109 MMOL/L (ref 99–110)
CO2: 28 MMOL/L (ref 21–32)
CREAT SERPL-MCNC: <0.5 MG/DL (ref 0.6–1.2)
GFR AFRICAN AMERICAN: >60
GFR NON-AFRICAN AMERICAN: >60
GLUCOSE BLD-MCNC: 105 MG/DL (ref 70–99)
GLUCOSE BLD-MCNC: 139 MG/DL (ref 70–99)
GLUCOSE BLD-MCNC: 143 MG/DL (ref 70–99)
GLUCOSE BLD-MCNC: 67 MG/DL (ref 70–99)
GLUCOSE BLD-MCNC: 77 MG/DL (ref 70–99)
GLUCOSE BLD-MCNC: 79 MG/DL (ref 70–99)
GLUCOSE BLD-MCNC: 82 MG/DL (ref 70–99)
GLUCOSE BLD-MCNC: 91 MG/DL (ref 70–99)
PERFORMED ON: ABNORMAL
PERFORMED ON: NORMAL
POTASSIUM SERPL-SCNC: 3.4 MMOL/L (ref 3.5–5.1)
PRO-BNP: 2036 PG/ML (ref 0–449)
SODIUM BLD-SCNC: 143 MMOL/L (ref 136–145)

## 2022-08-28 PROCEDURE — 2580000003 HC RX 258: Performed by: NURSE PRACTITIONER

## 2022-08-28 PROCEDURE — 99233 SBSQ HOSP IP/OBS HIGH 50: CPT | Performed by: INTERNAL MEDICINE

## 2022-08-28 PROCEDURE — 6370000000 HC RX 637 (ALT 250 FOR IP): Performed by: INTERNAL MEDICINE

## 2022-08-28 PROCEDURE — 2580000003 HC RX 258: Performed by: INTERNAL MEDICINE

## 2022-08-28 PROCEDURE — 2060000000 HC ICU INTERMEDIATE R&B

## 2022-08-28 PROCEDURE — 71045 X-RAY EXAM CHEST 1 VIEW: CPT

## 2022-08-28 PROCEDURE — 2700000000 HC OXYGEN THERAPY PER DAY

## 2022-08-28 PROCEDURE — 99232 SBSQ HOSP IP/OBS MODERATE 35: CPT | Performed by: INTERNAL MEDICINE

## 2022-08-28 PROCEDURE — 83880 ASSAY OF NATRIURETIC PEPTIDE: CPT

## 2022-08-28 PROCEDURE — 80048 BASIC METABOLIC PNL TOTAL CA: CPT

## 2022-08-28 PROCEDURE — 6360000002 HC RX W HCPCS: Performed by: INTERNAL MEDICINE

## 2022-08-28 PROCEDURE — 36592 COLLECT BLOOD FROM PICC: CPT

## 2022-08-28 PROCEDURE — 94761 N-INVAS EAR/PLS OXIMETRY MLT: CPT

## 2022-08-28 PROCEDURE — 6370000000 HC RX 637 (ALT 250 FOR IP)

## 2022-08-28 RX ORDER — GUAIFENESIN 600 MG/1
600 TABLET, EXTENDED RELEASE ORAL 2 TIMES DAILY
Status: DISCONTINUED | OUTPATIENT
Start: 2022-08-28 | End: 2022-08-28

## 2022-08-28 RX ORDER — GUAIFENESIN/DEXTROMETHORPHAN 100-10MG/5
5 SYRUP ORAL EVERY 8 HOURS
Status: DISCONTINUED | OUTPATIENT
Start: 2022-08-28 | End: 2022-08-30 | Stop reason: HOSPADM

## 2022-08-28 RX ADMIN — POTASSIUM BICARBONATE 40 MEQ: 391 TABLET, EFFERVESCENT ORAL at 12:46

## 2022-08-28 RX ADMIN — Medication: at 20:34

## 2022-08-28 RX ADMIN — GUAIFENESIN AND DEXTROMETHORPHAN 5 ML: 100; 10 SYRUP ORAL at 15:31

## 2022-08-28 RX ADMIN — DEXTROSE MONOHYDRATE 125 ML: 100 INJECTION, SOLUTION INTRAVENOUS at 04:24

## 2022-08-28 RX ADMIN — GUAIFENESIN AND DEXTROMETHORPHAN 5 ML: 100; 10 SYRUP ORAL at 20:51

## 2022-08-28 RX ADMIN — FONDAPARINUX SODIUM 7.5 MG: 7.5 INJECTION, SOLUTION SUBCUTANEOUS at 08:21

## 2022-08-28 RX ADMIN — Medication 10 ML: at 08:49

## 2022-08-28 RX ADMIN — Medication: at 08:48

## 2022-08-28 ASSESSMENT — PAIN SCALES - WONG BAKER

## 2022-08-28 NOTE — PROGRESS NOTES
Pt's blood sugar 67. Pt asymptomatic. Tried to administer orange juice but pt refusing at this time. PRN dextrose 10% 125 ml administered. Recheck blood sugar up to 143.

## 2022-08-28 NOTE — PROGRESS NOTES
Pulmonary & Critical Care Medicine ICU Progress Note    CC: Acute respiratory failure    Events of Last 24 hours: less alert today    Invasive Lines: 8/17/2022 right femoral CVC in ER    MV: 8/17/2022-8/20/22    IV:   dextrose      sodium chloride       Vitals:  Blood pressure 108/70, pulse 72, temperature 97.9 °F (36.6 °C), temperature source Oral, resp. rate 16, height 5' 6\" (1.676 m), weight 146 lb 3.2 oz (66.3 kg), SpO2 98 %. on 6L  Constitutional:  No acute distress. Eyes: PERRL. Conjunctivae anicteric. ENT: Normal nose. Normal tongue. Neck:  Trachea is midline. No thyroid tenderness. Respiratory: No accessory muscle usage. Dec left BS. No wheezes. No rales. No Rhonchi. Cardiovascular: Normal S1S2. No digit clubbing. No digit cyanosis. No LE edema. Gastrointestinal: No mass palpated. No tenderness palpated. Psychiatric: Lethargic and not following commands. Scheduled Meds:   fondaparinux  7.5 mg SubCUTAneous Daily    Venelex   Topical BID    sodium chloride flush  5-40 mL IntraVENous 2 times per day    insulin lispro  0-4 Units SubCUTAneous Q4H     PRN Meds:  potassium chloride **OR** potassium alternative oral replacement **OR** potassium chloride, magnesium sulfate, glucose, dextrose bolus **OR** dextrose bolus, glucagon (rDNA), dextrose, ipratropium-albuterol, sodium chloride flush, sodium chloride, ondansetron **OR** ondansetron, polyethylene glycol, acetaminophen **OR** acetaminophen    Results:  CBC:   Recent Labs     08/26/22  0515 08/27/22  0510   WBC 16.1* 11.9*   HGB 8.7* 8.0*   HCT 26.3* 23.8*   MCV 95.8 96.2    312       BMP:   Recent Labs     08/26/22  0515 08/27/22  0510 08/28/22  0420    143 143   K 3.2* 3.6 3.4*    109 109   CO2 28 28 28   BUN 21* 19 18   CREATININE <0.5* <0.5* <0.5*       LIVER PROFILE:   No results for input(s): AST, ALT, LIPASE, BILIDIR, BILITOT, ALKPHOS in the last 72 hours. Invalid input(s):   AMYLASE,  ALB    Cultures:  8/17/2022 SARS-CoV-2 and influenza are negative  2022 blood sent  2022 streptococcal pneumonia antigen is positive  2022 tracheal aspirate MRSA (LEATHA 1) and streptococcal pneumonia; Imagin2022 C-spine no acute abnormality  2022 chest CT left greater than right lower lobe predominant airspace disease  2022 abdominal CT gallbladder distention, cystic pancreatic lesions, atherosclerotic disease of aorta    2022 CXR left-sided airspace disease    22 CXR: New large left pleural effusion with complete opacification of the left lung.     ECHO 19 with EF 60-65%, Grade I DD     ASSESSMENT:  New left lung atelectasis vs effusion - with some shift to the left may be atelectasis  Acute hypoxemic respiratory failure   Septic shock resolved  Acute kidney failure has resolved  A. fib RVR, has converted to sinus bradycardia  Streptococcal pneumoniae pneumonia, respiratory culture also with MRSA  Dementia of the Alzheimer's type  Diabetes  Thrombocytopenia      PLAN:  Start Metanebs   mucinex  If repeat CXR tomorrow is unchanged, then \ will need CT or ultrasound to differentiate atelectasis and effusion  Supplemental oxygen to maintain SaO2 >92%; wean as tolerated    Completed 7 days CTX/Vanc  Arixtra until able to give DOAC  MICHAEL pending with + HIT    Recommend removing CVC  Hold Arixtra for possible procedure tomorrow

## 2022-08-28 NOTE — PROGRESS NOTES
Hospitalist Progress Note      PCP: No primary care provider on file. Date of Admission: 8/17/2022    Subjective: awake and mostly non verbal for me today - this is unchanged. She tolerated all of her corn earlier today per nursing report - this is progress for her. Ongoing anasarca with presacral and elbow dependent edema - encourage protein. Medications:  Reviewed    Infusion Medications    dextrose      sodium chloride       Scheduled Medications    guaiFENesin-dextromethorphan  5 mL Oral q8h    [Held by provider] fondaparinux  7.5 mg SubCUTAneous Daily    Venelex   Topical BID    sodium chloride flush  5-40 mL IntraVENous 2 times per day    insulin lispro  0-4 Units SubCUTAneous Q4H     PRN Meds: potassium chloride **OR** potassium alternative oral replacement **OR** potassium chloride, magnesium sulfate, glucose, dextrose bolus **OR** dextrose bolus, glucagon (rDNA), dextrose, ipratropium-albuterol, sodium chloride flush, sodium chloride, ondansetron **OR** ondansetron, polyethylene glycol, acetaminophen **OR** acetaminophen      Intake/Output Summary (Last 24 hours) at 8/28/2022 1518  Last data filed at 8/28/2022 0500  Gross per 24 hour   Intake 403 ml   Output 1125 ml   Net -722 ml         Physical Exam Performed:    /70   Pulse 72   Temp 97.9 °F (36.6 °C) (Oral)   Resp 16   Ht 5' 6\" (1.676 m)   Wt 146 lb 3.2 oz (66.3 kg)   SpO2 98%   BMI 23.60 kg/m²     Gen: No distress. Alert. Elderly female, diffuse anasarca  Eyes: PERRL. No sclera icterus. No conjunctival injection. Neck: No JVD. Trachea midline. Resp: No accessory muscle use. No crackles. No wheezes. No rhonchi. +Diminished breath sounds bilaterally   CV: Regular rate. Regular rhythm. No murmur. No rub. Peripheral Pulses: +2 palpable, equal bilaterally   GI: Non-tender. Non-distended. Normal bowel sounds. Skin: Warm and dry. No nodule on exposed extremities. No rash on exposed extremities.    M/S: No cyanosis. No joint deformity. No clubbing. Neuro: Awake. Grossly nonfocal. Minimally responding, moaning   Psych: No anxiety or agitation    Labs:   Recent Labs     08/26/22  0515 08/27/22  0510   WBC 16.1* 11.9*   HGB 8.7* 8.0*   HCT 26.3* 23.8*    312       Recent Labs     08/26/22  0515 08/27/22  0510 08/28/22  0420    143 143   K 3.2* 3.6 3.4*    109 109   CO2 28 28 28   BUN 21* 19 18   CREATININE <0.5* <0.5* <0.5*   CALCIUM 7.9* 8.1* 7.8*       No results for input(s): AST, ALT, BILIDIR, BILITOT, ALKPHOS in the last 72 hours. No results for input(s): INR in the last 72 hours. No results for input(s): Kimberley Noble in the last 72 hours. Urinalysis:      Lab Results   Component Value Date/Time    NITRU POSITIVE 08/17/2022 12:30 PM    WBCUA 6-9 08/17/2022 12:30 PM    BACTERIA 1+ 08/17/2022 12:30 PM    RBCUA 5-10 08/17/2022 12:30 PM    BLOODU MODERATE 08/17/2022 12:30 PM    SPECGRAV >=1.030 08/17/2022 12:30 PM    GLUCOSEU 100 08/17/2022 12:30 PM       Radiology:  XR CHEST 1 VIEW   Final Result   New large left pleural effusion with complete opacification of the left lung. XR CHEST PORTABLE   Final Result   Patchy infiltrates seen within the lungs bilaterally which may represent   multifocal pneumonia. XR CHEST PORTABLE   Final Result   Slightly improving aeration of the lungs in this intubated patient. XR CHEST PORTABLE   Final Result   No significant interval change in moderate left-sided airspace disease as   compared to prior. Trace bilateral pleural effusions or pleural thickening. CT ABDOMEN PELVIS W IV CONTRAST Additional Contrast? None   Final Result   1. No pulmonary embolism. 2. Multifocal airspace opacities most prominent in the left lower lobe. Pattern likely represents developing pneumonia. ARDS may be considered. 3. Distended gallbladder with no pattern of inflammation or biliary   dilatation.   This may be due to fasting status or other systemic processes   described above. 4. Multiple cystic lesions within the pancreas that have developed or are   increased from a prior study in 2016. Recommend follow-up imaging in 2 years   to ensure stability. 5. Advanced atherosclerotic changes of the aorta in the lower abdomen with   stenosis noted without occlusion. CT CHEST PULMONARY EMBOLISM W CONTRAST   Final Result   1. No pulmonary embolism. 2. Multifocal airspace opacities most prominent in the left lower lobe. Pattern likely represents developing pneumonia. ARDS may be considered. 3. Distended gallbladder with no pattern of inflammation or biliary   dilatation. This may be due to fasting status or other systemic processes   described above. 4. Multiple cystic lesions within the pancreas that have developed or are   increased from a prior study in 2016. Recommend follow-up imaging in 2 years   to ensure stability. 5. Advanced atherosclerotic changes of the aorta in the lower abdomen with   stenosis noted without occlusion. CT CERVICAL SPINE WO CONTRAST   Final Result   No acute abnormality of the cervical spine. CT HEAD WO CONTRAST   Final Result   No acute intracranial abnormality. XR CHEST PORTABLE   Final Result   1. Left-sided pneumonia. Recommend imaging follow-up to resolution. 2. Devices in place as above.          XR CHEST 1 VIEW    (Results Pending)           Assessment/Plan:    Active Hospital Problems    Diagnosis     Atrial fibrillation with RVR ([address] [I48.91]      Priority: Medium    NEELIMA (acute kidney injury) ([address] [N17.9]      Priority: Medium    Acute respiratory failure with hypoxia (HCC) [J96.01]      Priority: Medium    Moderate protein-calorie malnutrition ([address] [E44.0]      Priority: Medium    Acute renal failure with acute cortical necrosis (HCC) [N17.1]      Priority: Medium    Multifocal pneumonia [J18.9]      Priority: Medium    Acute hypoxemic respiratory failure ([address] [J96.01]      Priority: Medium    Septic shock ([facility] [A41.9, R65.21]      Priority: Medium         Pneumococcal pneumonia   - imaging with Multifocal pneumonia- cx with MRSA and also strep pneumo   - started on cefepime and vanc-cefepime switched to Rocephin . Continue vancomycin and Rocephin , day 7 of 7 . Course of antibiotics completed. - blood cx remain neg   -S/p mechanical ventilation --> off vent  - oxygenation remains challenging. Seen by pulmonary critical care         Acute hypoxemic resp failure  Ongoing need for 8l/min O2   wean O2 as tolerated. Patient is limited code, but family requesting reintubation if needed       Sepsis- severe with shock  - hypotensive despite IVF boluses ; was requiring pressors Levophed and vasopressin .   - S/P stress dose steroids  - Off pressors now . Blood pressure stable       Afib with RVR   - converted to NSR with amio and digoxin  - Cardiology consulted  -TSH wnl  - [id number] Score of 4; anticoagulation indicated. -Patient started on heparin drip, per cardiology recommendations to switch to NOAC when appropriate and set up a cardiac monitor at discharge.  -> Thrombocytopenia now, rule out HIT. Heparin drip stopped and patient was started on Arixtra  on 8/22 for anticoagulation   - HIT positive, MICHAEL still pending. Thrombocytopenia  - HIT antibody positive, MICHAEL pending -->not yet back  -Stopped heparin.   -Monitor platelet count--> remaining stable        Hypokalemia   -mild 3.1 today   -replacement protocol prn        DM 2  - holding home meds, currently on ssi       Acute renal failure - resolved  - sec to sepsis- likely ATN  - improved with IVF, dc IVF       Advanced dementia  -Limited code  But family does want reintubation if needed ; no CPR no defib        Hypokalemia   - repleted       Dysphagia  -Speech therapy following daily  Still n.p.o., with pleasure feeds  - was on TF via [address]  - now [address] removed.  Start oral diet per SLP  - if nutrition inadequate - might consider NG and start feeds            DVT Prophylaxis: arixtra   Diet: ADULT DIET; Dysphagia - Pureed  ADULT ORAL NUTRITION SUPPLEMENT; Lunch, Dinner; Frozen Oral Supplement  Code Status: Limited    PT/OT Eval Status: ordered    Dispo - cont care, prognosis very poor. Very little if any progress over the weekend.  CXR worse - noted pulm plans for repeat XR and possibly thoracentesis tomorrow am.       Bernardo Alvarez MD

## 2022-08-28 NOTE — PROGRESS NOTES
Shift assessment complete. Call light and bedside table within reach.  Electronically signed by Cathyann Snellen, RN on 8/27/2022 at 8:53 PM

## 2022-08-28 NOTE — PROGRESS NOTES
End of shift report given to [employer]. Pt in stable condition,care transferred at this itme. Electronically signed by Jose Fajardo RN on 8/28/2022 at 7:56 AM

## 2022-08-28 NOTE — PROGRESS NOTES
Spoke with Reina Sheriff at lab to inquire about serotonin that has not resulted yet.  She looked into it and states that it should be resulted by tomorrow or Tuesday at the latest.

## 2022-08-29 ENCOUNTER — APPOINTMENT (OUTPATIENT)
Dept: GENERAL RADIOLOGY | Age: 82
DRG: 871 | End: 2022-08-29
Payer: MEDICARE

## 2022-08-29 PROBLEM — J15.4 STREPTOCOCCAL PNEUMONIA (HCC): Status: ACTIVE | Noted: 2022-08-29

## 2022-08-29 PROBLEM — J98.19 LUNG COLLAPSE: Status: ACTIVE | Noted: 2022-08-29

## 2022-08-29 LAB
ANION GAP SERPL CALCULATED.3IONS-SCNC: 6 MMOL/L (ref 3–16)
BUN BLDV-MCNC: 15 MG/DL (ref 7–20)
CALCIUM SERPL-MCNC: 7.9 MG/DL (ref 8.3–10.6)
CHLORIDE BLD-SCNC: 110 MMOL/L (ref 99–110)
CO2: 27 MMOL/L (ref 21–32)
CREAT SERPL-MCNC: <0.5 MG/DL (ref 0.6–1.2)
GFR AFRICAN AMERICAN: >60
GFR NON-AFRICAN AMERICAN: >60
GLUCOSE BLD-MCNC: 101 MG/DL (ref 70–99)
GLUCOSE BLD-MCNC: 101 MG/DL (ref 70–99)
GLUCOSE BLD-MCNC: 106 MG/DL (ref 70–99)
GLUCOSE BLD-MCNC: 118 MG/DL (ref 70–99)
GLUCOSE BLD-MCNC: 122 MG/DL (ref 70–99)
GLUCOSE BLD-MCNC: 94 MG/DL (ref 70–99)
GLUCOSE BLD-MCNC: 99 MG/DL (ref 70–99)
HCT VFR BLD CALC: 24 % (ref 36–48)
HEMOGLOBIN: 7.9 G/DL (ref 12–16)
INR BLD: 1.34 (ref 0.87–1.14)
MCH RBC QN AUTO: 32.1 PG (ref 26–34)
MCHC RBC AUTO-ENTMCNC: 33 G/DL (ref 31–36)
MCV RBC AUTO: 97.2 FL (ref 80–100)
PDW BLD-RTO: 17.6 % (ref 12.4–15.4)
PERFORMED ON: ABNORMAL
PERFORMED ON: NORMAL
PERFORMED ON: NORMAL
PLATELET # BLD: 299 K/UL (ref 135–450)
PMV BLD AUTO: 7.3 FL (ref 5–10.5)
POTASSIUM SERPL-SCNC: 3.7 MMOL/L (ref 3.5–5.1)
PROTHROMBIN TIME: 16.4 SEC (ref 11.7–14.5)
RBC # BLD: 2.47 M/UL (ref 4–5.2)
SODIUM BLD-SCNC: 143 MMOL/L (ref 136–145)
WBC # BLD: 8.6 K/UL (ref 4–11)

## 2022-08-29 PROCEDURE — 2580000003 HC RX 258: Performed by: NURSE PRACTITIONER

## 2022-08-29 PROCEDURE — 92526 ORAL FUNCTION THERAPY: CPT

## 2022-08-29 PROCEDURE — 6370000000 HC RX 637 (ALT 250 FOR IP): Performed by: NURSE PRACTITIONER

## 2022-08-29 PROCEDURE — 2060000000 HC ICU INTERMEDIATE R&B

## 2022-08-29 PROCEDURE — 6370000000 HC RX 637 (ALT 250 FOR IP): Performed by: INTERNAL MEDICINE

## 2022-08-29 PROCEDURE — 99233 SBSQ HOSP IP/OBS HIGH 50: CPT | Performed by: INTERNAL MEDICINE

## 2022-08-29 PROCEDURE — 85610 PROTHROMBIN TIME: CPT

## 2022-08-29 PROCEDURE — 80048 BASIC METABOLIC PNL TOTAL CA: CPT

## 2022-08-29 PROCEDURE — 85027 COMPLETE CBC AUTOMATED: CPT

## 2022-08-29 PROCEDURE — 94761 N-INVAS EAR/PLS OXIMETRY MLT: CPT

## 2022-08-29 PROCEDURE — 2700000000 HC OXYGEN THERAPY PER DAY

## 2022-08-29 PROCEDURE — 71045 X-RAY EXAM CHEST 1 VIEW: CPT

## 2022-08-29 RX ADMIN — GUAIFENESIN AND DEXTROMETHORPHAN 5 ML: 100; 10 SYRUP ORAL at 04:58

## 2022-08-29 RX ADMIN — Medication: at 20:18

## 2022-08-29 RX ADMIN — ACETAMINOPHEN 650 MG: 325 TABLET ORAL at 20:19

## 2022-08-29 RX ADMIN — Medication 10 ML: at 20:19

## 2022-08-29 RX ADMIN — Medication: at 08:56

## 2022-08-29 ASSESSMENT — PAIN SCALES - GENERAL
PAINLEVEL_OUTOF10: 4
PAINLEVEL_OUTOF10: 0
PAINLEVEL_OUTOF10: 0

## 2022-08-29 ASSESSMENT — PAIN SCALES - WONG BAKER
WONGBAKER_NUMERICALRESPONSE: 0

## 2022-08-29 ASSESSMENT — PAIN - FUNCTIONAL ASSESSMENT: PAIN_FUNCTIONAL_ASSESSMENT: ACTIVITIES ARE NOT PREVENTED

## 2022-08-29 ASSESSMENT — PAIN DESCRIPTION - LOCATION: LOCATION: GENERALIZED

## 2022-08-29 NOTE — PROGRESS NOTES
PM assessment completed. Scheduled medications given per MAR. VSS 4 liter NC, A/O x4, denies any needs at this time. Call light in reach, will monitor, bed alarm on.

## 2022-08-29 NOTE — PROGRESS NOTES
Pulmonary & Critical Care Medicine ICU Progress Note    CC: Acute respiratory failure    Events of Last 24 hours:   Nonverbal  O2 4 L      Invasive Lines: 8/17/2022 right femoral CVC in ER      MV: 8/17/2022-8/20/22    IV:   dextrose      sodium chloride       Vitals:  Blood pressure 122/70, pulse 66, temperature 97.5 °F (36.4 °C), temperature source Axillary, resp. rate 16, height 5' 6\" (1.676 m), weight 146 lb 6 oz (66.4 kg), SpO2 92 %. on 6L  Constitutional:  No acute distress. Eyes: PERRL. Conjunctivae anicteric. ENT: Normal nose. Normal tongue. Neck:  Trachea is midline. No thyroid tenderness. Respiratory: No accessory muscle usage. Dec left BS. No wheezes. No rales. No Rhonchi. Left dullness  Cardiovascular: Normal S1S2. No digit clubbing. No digit cyanosis. No LE edema. Gastrointestinal: No mass palpated. No tenderness palpated. Psychiatric: Awake.   Not communicative      Scheduled Meds:   guaiFENesin-dextromethorphan  5 mL Oral q8h    [Held by provider] fondaparinux  7.5 mg SubCUTAneous Daily    Venelex   Topical BID    sodium chloride flush  5-40 mL IntraVENous 2 times per day    insulin lispro  0-4 Units SubCUTAneous Q4H     PRN Meds:  potassium chloride **OR** potassium alternative oral replacement **OR** potassium chloride, magnesium sulfate, glucose, dextrose bolus **OR** dextrose bolus, glucagon (rDNA), dextrose, ipratropium-albuterol, sodium chloride flush, sodium chloride, ondansetron **OR** ondansetron, polyethylene glycol, acetaminophen **OR** acetaminophen    Results:    CBC:   Recent Labs     08/27/22  0510 08/29/22  0500   WBC 11.9* 8.6   HGB 8.0* 7.9*   HCT 23.8* 24.0*   MCV 96.2 97.2    299       BMP:   Recent Labs     08/27/22  0510 08/28/22  0420 08/29/22  0500    143 143   K 3.6 3.4* 3.7    109 110   CO2 28 28 27   BUN 19 18 15   CREATININE <0.5* <0.5* <0.5*       LIVER PROFILE:   No results for input(s): AST, ALT, LIPASE, BILIDIR, BILITOT, ALKPHOS in the last 72 hours. Invalid input(s): AMYLASE,  ALB    Cultures:  2022 SARS-CoV-2 and influenza are negative  2022 blood sent  2022 streptococcal pneumonia antigen is positive  2022 tracheal aspirate MRSA (LEATHA 1) and streptococcal pneumonia; Imagin2022 chest CT left greater than right lower lobe predominant airspace disease  2022 CXR left-sided airspace disease  22 CXR: Complete left sided opacification  ECHO 19 with EF 60-65%, Grade I DD       ASSESSMENT:  Acute hypoxemic respiratory failure  New left lung atelectasis vs effusion - with some shift to the left may be atelectasis. Suspecting collapsed lung from mucous plug   Streptococcal pneumoniae pneumonia, respiratory culture also with MRSA  Dementia of the Alzheimer's type    PLAN:  Supplemental oxygen to maintain SaO2 >92%; wean as tolerated  Metanebs and Mucinex  Completed 7 days CTX/Vanc  Arixtra until able to give DOAC  MICHAEL pending with + HIT    Recommend removing CVC  IM discussed CODE STATUS with family and considering palliative care/hospice care which I think is reasonable.

## 2022-08-29 NOTE — PROGRESS NOTES
Peripheral IV #[id number] inserted to right forearm. Patient tolerated procedure well. Primary nurse updated.

## 2022-08-29 NOTE — PROGRESS NOTES
Shift assessment is complete. Pt is awake, nonverbal, responds to pain. She continues on 4 liters nasal canula. Femoral line continues along with may catheter. VSS.

## 2022-08-29 NOTE — PROGRESS NOTES
Hospitalist Progress Note      PCP: No primary care provider on file. Date of Admission: 8/17/2022    Subjective: awake and mostly non verbal for me today. Medications:  Reviewed    Infusion Medications    dextrose      sodium chloride       Scheduled Medications    guaiFENesin-dextromethorphan  5 mL Oral q8h    [Held by provider] fondaparinux  7.5 mg SubCUTAneous Daily    Venelex   Topical BID    sodium chloride flush  5-40 mL IntraVENous 2 times per day    insulin lispro  0-4 Units SubCUTAneous Q4H     PRN Meds: potassium chloride **OR** potassium alternative oral replacement **OR** potassium chloride, magnesium sulfate, glucose, dextrose bolus **OR** dextrose bolus, glucagon (rDNA), dextrose, ipratropium-albuterol, sodium chloride flush, sodium chloride, ondansetron **OR** ondansetron, polyethylene glycol, acetaminophen **OR** acetaminophen      Intake/Output Summary (Last 24 hours) at 8/29/2022 0814  Last data filed at 8/28/2022 1940  Gross per 24 hour   Intake 190 ml   Output 300 ml   Net -110 ml         Physical Exam Performed:    /70   Pulse 66   Temp 97.5 °F (36.4 °C) (Axillary)   Resp 16   Ht 5' 6\" (1.676 m)   Wt 146 lb 6 oz (66.4 kg)   SpO2 92%   BMI 23.63 kg/m²     Gen: No distress. Alert. Elderly female, diffuse anasarca  Eyes: PERRL. No sclera icterus. No conjunctival injection. Neck: No JVD. Trachea midline. Resp: No accessory muscle use. No crackles. No wheezes. No rhonchi. +Diminished breath sounds bilaterally   CV: Regular rate. Regular rhythm. No murmur. No rub. Peripheral Pulses: +2 palpable, equal bilaterally   GI: Non-tender. Non-distended. Normal bowel sounds. Skin: Warm and dry. No nodule on exposed extremities. No rash on exposed extremities. M/S: No cyanosis. No joint deformity. No clubbing. Neuro: Awake.  Grossly nonfocal. Minimally responding, moaning   Psych: No anxiety or agitation    Labs:   Recent Labs     08/27/22  0510 08/29/22  0500   WBC 11.9* 8.6   HGB 8.0* 7.9*   HCT 23.8* 24.0*    299       Recent Labs     08/27/22  0510 08/28/22  0420 08/29/22  0500    143 143   K 3.6 3.4* 3.7    109 110   CO2 28 28 27   BUN 19 18 15   CREATININE <0.5* <0.5* <0.5*   CALCIUM 8.1* 7.8* 7.9*       No results for input(s): AST, ALT, BILIDIR, BILITOT, ALKPHOS in the last 72 hours. Recent Labs     08/29/22  0500   INR 1.34*     No results for input(s): Adonica Hoose in the last 72 hours. Urinalysis:      Lab Results   Component Value Date/Time    NITRU POSITIVE 08/17/2022 12:30 PM    WBCUA 6-9 08/17/2022 12:30 PM    BACTERIA 1+ 08/17/2022 12:30 PM    RBCUA 5-10 08/17/2022 12:30 PM    BLOODU MODERATE 08/17/2022 12:30 PM    SPECGRAV >=1.030 08/17/2022 12:30 PM    GLUCOSEU 100 08/17/2022 12:30 PM       Radiology:  XR CHEST 1 VIEW   Final Result   New large left pleural effusion with complete opacification of the left lung. XR CHEST PORTABLE   Final Result   Patchy infiltrates seen within the lungs bilaterally which may represent   multifocal pneumonia. XR CHEST PORTABLE   Final Result   Slightly improving aeration of the lungs in this intubated patient. XR CHEST PORTABLE   Final Result   No significant interval change in moderate left-sided airspace disease as   compared to prior. Trace bilateral pleural effusions or pleural thickening. CT ABDOMEN PELVIS W IV CONTRAST Additional Contrast? None   Final Result   1. No pulmonary embolism. 2. Multifocal airspace opacities most prominent in the left lower lobe. Pattern likely represents developing pneumonia. ARDS may be considered. 3. Distended gallbladder with no pattern of inflammation or biliary   dilatation. This may be due to fasting status or other systemic processes   described above. 4. Multiple cystic lesions within the pancreas that have developed or are   increased from a prior study in 2016.   Recommend follow-up imaging in 2 years   to ensure stability. 5. Advanced atherosclerotic changes of the aorta in the lower abdomen with   stenosis noted without occlusion. CT CHEST PULMONARY EMBOLISM W CONTRAST   Final Result   1. No pulmonary embolism. 2. Multifocal airspace opacities most prominent in the left lower lobe. Pattern likely represents developing pneumonia. ARDS may be considered. 3. Distended gallbladder with no pattern of inflammation or biliary   dilatation. This may be due to fasting status or other systemic processes   described above. 4. Multiple cystic lesions within the pancreas that have developed or are   increased from a prior study in 2016. Recommend follow-up imaging in 2 years   to ensure stability. 5. Advanced atherosclerotic changes of the aorta in the lower abdomen with   stenosis noted without occlusion. CT CERVICAL SPINE WO CONTRAST   Final Result   No acute abnormality of the cervical spine. CT HEAD WO CONTRAST   Final Result   No acute intracranial abnormality. XR CHEST PORTABLE   Final Result   1. Left-sided pneumonia. Recommend imaging follow-up to resolution. 2. Devices in place as above.          XR CHEST 1 VIEW    (Results Pending)       Assessment/Plan:    Active Hospital Problems    Diagnosis     Atrial fibrillation with RVR ([address] [I48.91]      Priority: Medium    NEELIMA (acute kidney injury) ([address] [N17.9]      Priority: Medium    Acute respiratory failure with hypoxia (HCC) [J96.01]      Priority: Medium    Moderate protein-calorie malnutrition ([address] [E44.0]      Priority: Medium    Acute renal failure with acute cortical necrosis (HCC) [N17.1]      Priority: Medium    Multifocal pneumonia [J18.9]      Priority: Medium    Acute hypoxemic respiratory failure ([address] [J96.01]      Priority: Medium    Septic shock ([address] [A41.9, R65.21]      Priority: Medium         Pneumococcal pneumonia   - imaging with Multifocal pneumonia- cx with MRSA and also strep pneumo   - started on cefepime and vanc-cefepime switched to Rocephin . Continue vancomycin and Rocephin , day 7 of 7 . Course of antibiotics completed. - blood cx remain neg   -S/p mechanical ventilation --> off vent  - oxygenation remains challenging. Seen by pulmonary critical care  CXR 8/28-large left-sided pleural effusion     Acute hypoxemic resp failure  Ongoing need for 8l/min O2   wean O2 as tolerated. Patient is limited code, but family requesting reintubation if needed     Sepsis- severe with shock  - hypotensive despite IVF boluses ; was requiring pressors Levophed and vasopressin .   - S/P stress dose steroids  - Off pressors now . Blood pressure stable     Afib with RVR   - converted to NSR with amio and digoxin  - Cardiology consulted  -TSH wnl  - [id number] Score of 4; anticoagulation indicated. -Patient started on heparin drip, per cardiology recommendations to switch to NOAC when appropriate and set up a cardiac monitor at discharge.  -> Thrombocytopenia now, rule out HIT. Heparin drip stopped and patient was started on Arixtra  on 8/22 for anticoagulation   - HIT positive, MICHAEL still pending. Thrombocytopenia  - HIT antibody positive, MICHAEL pending -->not yet back  -Stopped heparin.   -Monitor platelet count--> remaining stable      Hypokalemia   -mild 3.1 today   -replacement protocol prn      DM 2  - holding home meds, currently on ssi     Acute renal failure - resolved  - sec to sepsis- likely ATN  - improved with IVF, dc IVF     Advanced dementia  -Limited code  But family does want reintubation if needed ; no CPR no defib      Hypokalemia   - repleted     Dysphagia  -Speech therapy following daily  Still n.p.o., with pleasure feeds  -was on TF via [address]  - now [address] [facility] removed. Start oral diet per SLP  - if nutrition inadequate - might consider NG and start feeds      DVT Prophylaxis: arixtra   Diet: ADULT DIET;  Dysphagia - Pureed  ADULT ORAL NUTRITION SUPPLEMENT; Lunch, Dinner; Frozen Oral Supplement  Code Status: Limited    PT/OT Eval Status: ordered    Dispo - cont care, prognosis very poor. Spoke with the patient's daughter on the phone. Discussed goals of care. Patient's daughter feels that she does not want her mother to suffer anymore.   She is going to talk to her brother who is the POA and let us know about possible change in CODE STATUS and possible hospice consult      Jose Dial MD

## 2022-08-29 NOTE — PLAN OF CARE
Problem: Discharge Planning  Goal: Discharge to home or other facility with appropriate resources  8/28/2022 2335 by Judee Landau, RN  Outcome: Progressing  8/28/2022 1128 by Ruel Hennessy RN  Outcome: Progressing     Problem: Pain  Goal: Verbalizes/displays adequate comfort level or baseline comfort level  8/28/2022 2335 by Judee Landau, RN  Outcome: Progressing  8/28/2022 1128 by Ruel Hennessy RN  Outcome: Progressing     Problem: Chronic Conditions and Co-morbidities  Goal: Patient's chronic conditions and co-morbidity symptoms are monitored and maintained or improved  8/28/2022 2335 by Judee Landau, RN  Outcome: Progressing  8/28/2022 1128 by Ruel Hennessy RN  Outcome: Progressing     Problem: Nutrition Deficit:  Goal: Optimize nutritional status  8/28/2022 2335 by Judee Landau, RN  Outcome: Progressing  8/28/2022 1128 by Ruel Hennessy RN  Outcome: Progressing     Problem: Safety - Adult  Goal: Free from fall injury  8/28/2022 2335 by Judee Landau, RN  Outcome: Progressing  8/28/2022 1128 by Ruel Hennessy RN  Outcome: Progressing  Flowsheets (Taken 8/28/2022 1126)  Free From Fall Injury: Carmen Rosen family/caregiver on patient safety     Problem: Chronic Conditions and Co-morbidities  Goal: Patient's chronic conditions and co-morbidity symptoms are monitored and maintained or improved  8/28/2022 2335 by Judee Landau, RN  Outcome: Progressing  8/28/2022 1128 by Ruel Hennessy RN  Outcome: Progressing     Problem: Nutrition Deficit:  Goal: Optimize nutritional status  8/28/2022 2335 by Judee Landau, RN  Outcome: Progressing  8/28/2022 1128 by Ruel Hennessy RN  Outcome: Progressing     Problem: Safety - Adult  Goal: Free from fall injury  8/28/2022 2335 by Judee Landau, RN  Outcome: Progressing  8/28/2022 1128 by Ruel Hennessy RN  Outcome: Progressing  Flowsheets (Taken 8/28/2022 1126)  Free From Fall Injury: Carmen Rosen family/caregiver on patient safety     Problem: Skin/Tissue Integrity  Goal: Absence of new skin breakdown  Description: 1. Monitor for areas of redness and/or skin breakdown  2. Assess vascular access sites hourly  3. Every 4-6 hours minimum:  Change oxygen saturation probe site  4. Every 4-6 hours:  If on nasal continuous positive airway pressure, respiratory therapy assess nares and determine need for appliance change or resting period.   8/28/2022 2335 by Tony Haque RN  Outcome: Progressing  8/28/2022 1128 by Israel Parrish RN  Outcome: Progressing     Problem: ABCDS Injury Assessment  Goal: Absence of physical injury  8/28/2022 2335 by Tony Haque RN  Outcome: Progressing  8/28/2022 1128 by Israel Parrish RN  Outcome: Progressing  Flowsheets (Taken 8/28/2022 1126)  Absence of Physical Injury: Implement safety measures based on patient assessment     Problem: Neurosensory - Adult  Goal: Achieves stable or improved neurological status  8/28/2022 2335 by Tony Haque RN  Outcome: Progressing  8/28/2022 1128 by Israel Parrish RN  Outcome: Progressing     Problem: Respiratory - Adult  Goal: Achieves optimal ventilation and oxygenation  8/28/2022 2335 by Tony Haque RN  Outcome: Progressing  8/28/2022 1128 by Israel Parrish RN  Outcome: Progressing     Problem: Cardiovascular - Adult  Goal: Absence of cardiac dysrhythmias or at baseline  8/28/2022 2335 by Tony Haque RN  Outcome: Progressing  8/28/2022 1128 by Israel Parrish RN  Outcome: Progressing     Problem: Skin/Tissue Integrity - Adult  Goal: Skin integrity remains intact  8/28/2022 2335 by Tony Haque RN  Outcome: Progressing  8/28/2022 1128 by Israel Parrish RN  Outcome: Progressing  Flowsheets (Taken 8/28/2022 1126)  Skin Integrity Remains Intact: Monitor for areas of redness and/or skin breakdown     Problem: Musculoskeletal - Adult  Goal: Return mobility to safest level of assigned personnel  7.  Initiate Psychosocial CNS and Spiritual Care consult, as indicated  8/28/2022 2335 by Maksim Laboy RN  Outcome: Progressing  8/28/2022 1128 by Valentin Correia RN  Outcome: Progressing

## 2022-08-29 NOTE — PROGRESS NOTES
Femoral line and may catheter discontinued. Peripheral line #22 to right forearm, saline locked. External purewick applied. Family was contacted this morning to discuss plan of care, and possible add a palliative care consult, they are to call back with their decisions.

## 2022-08-29 NOTE — CARE COORDINATION
INTERDISCIPLINARY PLAN OF CARE CONFERENCE    Date/Time: 8/29/2022 12:09 PM  Completed by: Terry Bright RN, Case Management      Patient Name:  Vandana Osborne  YOB: 1940  Admitting Diagnosis: Multifocal pneumonia [J18.9]     Admit Date/Time:  8/17/2022 12:16 PM    Chart reviewed. Interdisciplinary team contacted or reviewed plan related to patient progress and discharge plans. Disciplines included Case Management, Nursing, and Dietitian. Current Status: 08/17/2022  PT/OT recommendation for discharge plan of care:    Expected D/C Disposition:  [facility] (family to decide about hospice care)  Confirmed plan   Discharge Plan Comments: Chart review. Await decision by family about hospice care. Code status addressed by Dr. Emely Lopez today. Prognosis poor. CM reached out to son, Alee Gregory (primary decision maker) to discuss DC recommendations. He and family will discuss options and follow up with CM. Pt can go to [facility]. +bed available. +[facility] and can have hospice if family wants it.  +CM following

## 2022-08-29 NOTE — PROGRESS NOTES
Speech Language Pathology  Dysphagia Treatment/Follow-Up Note  Facility/Department: [address] ICU    Recommendations: IDDSI 4 Puree Solids; IDDSI 0 Thin Liquids- straws OK; Meds crushed in puree as able- IF FULLY ALERT. **pt appeared to be fatiguing with PO intake (increased time to initiate swallow for all PO towards end of session); pt will likely benefit from smaller meals, more frequently. Pt also benefits from verbal and tactile cues to \"open mouth wide\" and \"take a sip:\"- palliative care meeting to take place    Risk Management: upright for all intake, stay upright for at least 30 mins after intake, small bites/sips, total feed,oral care 2-3x/day to reduce adverse affects in the event of aspiration, increase physical mobility as able, alternate bites/sips, slow rate of intake, general aspiration precautions, and hold PO and contact SLP if s/s of aspiration or worsening respiratory status develop. Recommend use of thermal-gustatory stimulation (really cold, hot, sweet, sour bolus) to promote improved communication between peripheral nervous system and central nervous system. This should help the patient recognize task and improve safety with PO intake. Suspect limited PO intake    NAME:Shilpa Reece  : 1940 (80 y.o.)   MRN: [id number]  ROOM: [place] [address]  ADMISSION DATE: 2022  PATIENT DIAGNOSIS(ES): Multifocal pneumonia [J18.9]  Allergies   Allergen Reactions    Avandia [Rosiglitazone Maleate]     Codeine      Per son Codeine caused patient to go into cardiac arrest    Glucophage [Metformin Hydrochloride]      Lactic acidosis      Hydrochlorothiazide     Tramadol        DATE ONSET: 2022    Pain: The patient does not complain of pain- pt grossly non-verbal       Current Diet: Diet NPO per diet orders. Per SLP note from previous session, recommended initiating pureed solids, thin liquids, meds crushed in puree    Diet Tolerance:  Pt still currently NPO.       Dysphagia Treatment and Impressions:  Subjective: Pt seen in room at bedside with RN permission. Reports concern re: alertness for PO intake  RN Report/Chart Review: Pt awake and cooperative, but does remain non-verbal. Pt upright in bed   Patient tolerance: Pt is NPO per diet orders. SLP had recommended initiating puree and thin liquids last week as long as pt was able to maintain alertness. PAWAN has been variable. Respiratory Status: Pt with SPO2% of 92-94 on 5 LPM HFNC with RR of 16    Liquid PO Trials:   IDDSI 0 Thin:  Assessed via straw: no anterior bolus loss, but pt did benefit from verbal cues to \"take a sip\" via the straw. Occ bolus holding towards end of PO trials, but suspect grossly functional A-P transit. Audible swallow; suspect delayed swallow. Pt taking consecutive sips. No overt s/s of aspiration - no coughing, throat clearing, wet vocal quality, change in vitals. Solid PO Trials  IDDSI 4 Puree:   utilized gustatory stim via cold and sweet bolus to promote improved sensory awareness. Pt would often barely open oral cavity, and then close oral cavity on anterior tip of spoon. Pt benefited from verbal; cues to \"open mouth wide\" and tactile cue of placing spoon to lips. There was no anterior bolus loss. Suspect reduced/impaired A-P bolus transit - pt required increased time. Swallow did appear timely with no overt s/s of aspiration. Occ bolus holding at end of PO trials - suspect pt fatiguing. Education: SLP edu pt re: Role of SLP, Rationale for dysphagia tx, Recommended compensatory strategies, Aspiration precautions, and POC. Pt no evidence of learning. RN aware of recommendations  Assessment: Pt grossly tolerating puree and thin liquids with no clinical s/s of aspiration. Poor carryover of recommended compensatory strategies. Suspect pt is fatiguing with PO trials - time for pt to initiate swallow increased towards end of session. Suspect pt will benefit from smaller meals, more frequently.    Pt also benefits from verbal and tactile cues to open oral cavity, accept PO, and occ needed to initiate a swallow   Recommend use of thermal-gustatory stimulation (really cold, hot, sweet, sour bolus) to promote improved communication between peripheral nervous system and central nervous system. This should help the patient recognize task and improve safety with PO intake. Suspect limited PO intake    Recommendations: SLP recommends IDDSI 4 Puree Solids; IDDSI 0 Thin Liquids- straws OK; Meds crushed in puree as able  *pt appeared to be fatiguing with PO intake (increased time to initiate swallow for all PO towards end of session); pt will likely benefit from smaller meals, more frequently. Pt also benefits from verbal and tactile cues to \"open mouth wide\" and \"take a sip:\"    Risk Management: upright for all intake, stay upright for at least 30 mins after intake, small bites/sips, total feed,oral care 2-3x/day to reduce adverse affects in the event of aspiration, increase physical mobility as able, alternate bites/sips, slow rate of intake, general aspiration precautions, and hold PO and contact SLP if s/s of aspiration or worsening respiratory status develop. Recommend use of thermal-gustatory stimulation (really cold, hot, sweet, sour bolus) to promote improved communication between peripheral nervous system and central nervous system. This should help the patient recognize task and improve safety with PO intake. Suspect limited PO intake    Dysphagia Goals:  Goals:  Short Term Goals:  Timeframe for Short Term Goals: (5 days 08/28/2022)  Goal 1: The patient will tolerate repeat BSE when able for ongoing assessment  8/29/2022 : Goal addressed, see above. Ongoing, progressing. Goal 2: The patient will tolerate instrumental assessment when able   8/29/2022 : Goal addressed, no appropriate at this time. Ongoing, progressing.     Goal 3: The patient will tolerate recommended diet with no clinical s/s of aspiration 5/5 8/29/2022 : Goal addressed, see above. Ongoing, progressing. Goal 4: The patient will tolerate therapeutic diet upgrade trials with no clinical s/s of aspiration 5/5 8/29/2022 : Goal addressed, see above. Ongoing, progressing. Long Term Goals:   Timeframe for Long Term Goals: (7 days 08/30/2022)  Goal 1: The patient will tolerate least restrictive diet with no clinical s/s of aspiration or worsening respiratory/pulmonary status  8/29/2022 : Ongoing, progressing. Speech/Language/Cog Goals: N/A     Recommendations:  Solid Consistency: puree  Liquid Consistency: thin liquids-straws OK  Medication: Meds crushed in puree as able    Plan:    Continued Dysphagia treatment with goals per plan of care. Discharge Recommendations: TBD    If pt discharges from hospital prior to Speech/Swallowing discharge, this note serves as tx and discharge summary.      Total Treatment Time / Charges     Time in Time out Total Time / units   Cognitive Tx         Speech Tx      Dysphagia Tx 1330 1347 17 min / 1 unit     Signature:  Chery Corbett M.A., [address] [id number]  Speech-Language Pathologist  Phone: [phone], [address] [facility]  Hours: [phone]

## 2022-08-29 NOTE — PROGRESS NOTES
Speech Language Pathology  SLP Attempt Note        Name: London Power  : 1940  Medical Diagnosis: Multifocal pneumonia [J18.9]    Attempting to see pt for dysphagia follow-up. RN reports pt with intermittent alertness today and did not feel safe to give PO intake. Ok'ed SLP entry and to try. Pt sleeping soundly upon SLP entry. Unable to wake. Will re-attempt at later time as schedule allows.     Charles Park M.A., [facility]  Speech-Language Pathologist  Phone: [phone]

## 2022-08-30 VITALS
OXYGEN SATURATION: 95 % | TEMPERATURE: 97.1 F | HEIGHT: 66 IN | BODY MASS INDEX: 22.43 KG/M2 | SYSTOLIC BLOOD PRESSURE: 107 MMHG | RESPIRATION RATE: 17 BRPM | HEART RATE: 98 BPM | DIASTOLIC BLOOD PRESSURE: 64 MMHG | WEIGHT: 139.56 LBS

## 2022-08-30 LAB
GLUCOSE BLD-MCNC: 66 MG/DL (ref 70–99)
GLUCOSE BLD-MCNC: 76 MG/DL (ref 70–99)
GLUCOSE BLD-MCNC: 76 MG/DL (ref 70–99)
GLUCOSE BLD-MCNC: 79 MG/DL (ref 70–99)
GLUCOSE BLD-MCNC: 96 MG/DL (ref 70–99)
PERFORMED ON: ABNORMAL
PERFORMED ON: NORMAL

## 2022-08-30 PROCEDURE — 2700000000 HC OXYGEN THERAPY PER DAY

## 2022-08-30 PROCEDURE — 99239 HOSP IP/OBS DSCHRG MGMT >30: CPT | Performed by: INTERNAL MEDICINE

## 2022-08-30 PROCEDURE — 99232 SBSQ HOSP IP/OBS MODERATE 35: CPT | Performed by: INTERNAL MEDICINE

## 2022-08-30 PROCEDURE — 92526 ORAL FUNCTION THERAPY: CPT

## 2022-08-30 PROCEDURE — 94761 N-INVAS EAR/PLS OXIMETRY MLT: CPT

## 2022-08-30 RX ADMIN — Medication: at 09:29

## 2022-08-30 ASSESSMENT — PAIN SCALES - WONG BAKER
WONGBAKER_NUMERICALRESPONSE: 0

## 2022-08-30 ASSESSMENT — PAIN SCALES - GENERAL
PAINLEVEL_OUTOF10: 0

## 2022-08-30 NOTE — PROGRESS NOTES
Hospitalist Progress Note      PCP: No primary care provider on file. Date of Admission: 8/17/2022    Subjective: awake and mostly non verbal for me today. Medications:  Reviewed    Infusion Medications    dextrose      sodium chloride       Scheduled Medications    guaiFENesin-dextromethorphan  5 mL Oral q8h    [Held by provider] fondaparinux  7.5 mg SubCUTAneous Daily    Venelex   Topical BID    sodium chloride flush  5-40 mL IntraVENous 2 times per day    insulin lispro  0-4 Units SubCUTAneous Q4H     PRN Meds: potassium chloride **OR** potassium alternative oral replacement **OR** potassium chloride, magnesium sulfate, glucose, dextrose bolus **OR** dextrose bolus, glucagon (rDNA), dextrose, ipratropium-albuterol, sodium chloride flush, sodium chloride, ondansetron **OR** ondansetron, polyethylene glycol, acetaminophen **OR** acetaminophen      Intake/Output Summary (Last 24 hours) at 8/30/2022 0829  Last data filed at 8/30/2022 0514  Gross per 24 hour   Intake 231.88 ml   Output 200 ml   Net 31.88 ml         Physical Exam Performed:    /67   Pulse 78   Temp 97.6 °F (36.4 °C) (Axillary)   Resp 16   Ht 5' 6\" (1.676 m)   Wt 139 lb 9 oz (63.3 kg)   SpO2 92%   BMI 22.53 kg/m²     Gen: No distress. Alert. Elderly female, diffuse anasarca  Eyes: PERRL. No sclera icterus. No conjunctival injection. Neck: No JVD. Trachea midline. Resp: No accessory muscle use. No crackles. No wheezes. No rhonchi. +Diminished breath sounds bilaterally   CV: Regular rate. Regular rhythm. No murmur. No rub. Peripheral Pulses: +2 palpable, equal bilaterally   GI: Non-tender. Non-distended. Normal bowel sounds. Skin: Warm and dry. No nodule on exposed extremities. No rash on exposed extremities. M/S: No cyanosis. No joint deformity. No clubbing. Neuro: Awake.  Grossly nonfocal. Minimally responding, moaning   Psych: No anxiety or agitation    Labs:   Recent Labs     08/29/22  0500   WBC 8.6   HGB 7.9* years   to ensure stability. 5. Advanced atherosclerotic changes of the aorta in the lower abdomen with   stenosis noted without occlusion. CT CHEST PULMONARY EMBOLISM W CONTRAST   Final Result   1. No pulmonary embolism. 2. Multifocal airspace opacities most prominent in the left lower lobe. Pattern likely represents developing pneumonia. ARDS may be considered. 3. Distended gallbladder with no pattern of inflammation or biliary   dilatation. This may be due to fasting status or other systemic processes   described above. 4. Multiple cystic lesions within the pancreas that have developed or are   increased from a prior study in 2016. Recommend follow-up imaging in 2 years   to ensure stability. 5. Advanced atherosclerotic changes of the aorta in the lower abdomen with   stenosis noted without occlusion. CT CERVICAL SPINE WO CONTRAST   Final Result   No acute abnormality of the cervical spine. CT HEAD WO CONTRAST   Final Result   No acute intracranial abnormality. XR CHEST PORTABLE   Final Result   1. Left-sided pneumonia. Recommend imaging follow-up to resolution. 2. Devices in place as above.              Assessment/Plan:    Active Hospital Problems    Diagnosis     Lung collapse [J98.19]      Priority: Medium    Streptococcal pneumonia ([facility] [address] [address] [J15.4]      Priority: Medium    Atrial fibrillation with RVR (HCC) [I48.91]      Priority: Medium    NEELIMA (acute kidney injury) ([facility] [address] [N17.9]      Priority: Medium    Acute respiratory failure with hypoxia (HCC) [J96.01]      Priority: Medium    Moderate protein-calorie malnutrition ([address] [E44.0]      Priority: Medium    Acute renal failure with acute cortical necrosis (HCC) [N17.1]      Priority: Medium    Multifocal pneumonia [J18.9]      Priority: Medium    Acute hypoxemic respiratory failure (HCC) [J96.01]      Priority: Medium    Septic shock (HCC) [A41.9, R65.21]      Priority: Medium         Pneumococcal pneumonia   - imaging with Multifocal pneumonia- cx with MRSA and also strep pneumo   - started on cefepime and vanc-cefepime switched to Rocephin . Continue vancomycin and Rocephin , day 7 of 7 . Course of antibiotics completed. - blood cx remain neg   -S/p mechanical ventilation --> off vent  - oxygenation remains challenging. Seen by pulmonary critical care  CXR 8/28-large left-sided pleural effusion. Discussed with patient's POA. Explained risk of thoracentesis including possible need for chest tube. We decided to not pursue thoracentesis. Acute hypoxemic resp failure  Ongoing need for 8l/min O2   wean O2 as tolerated. Patient is limited code     Sepsis- severe with shock  - hypotensive despite IVF boluses ; was requiring pressors Levophed and vasopressin .   - S/P stress dose steroids  - Off pressors now . Blood pressure stable     Afib with RVR   - converted to NSR with amio and digoxin  - Cardiology consulted  -TSH wnl  - [id number]-WBSn Score of 4; anticoagulation indicated. -Patient started on heparin drip, per cardiology recommendations to switch to NOAC when appropriate and set up a cardiac monitor at discharge.  -> Thrombocytopenia now, rule out HIT. Heparin drip stopped and patient was started on Arixtra  on 8/22 for anticoagulation   - HIT positive, MICHAEL still pending. Thrombocytopenia  - HIT antibody positive, MICHAEL pending -->not yet back  -Stopped heparin.   -Monitor platelet count--> remaining stable      Hypokalemia   -mild 3.1 today   -replacement protocol prn      DM 2  - holding home meds, currently on ssi     Acute renal failure - resolved  - sec to sepsis- likely ATN  - improved with IVF, dc IVF     Advanced dementia  -Limited code  But family does want reintubation if needed ; no CPR no defib      Hypokalemia   - repleted     Dysphagia  -Speech therapy following daily  Still n.p.o., with pleasure feeds  -was on TF via [address]  - now [address] removed.  Start oral diet per SLP        DVT Prophylaxis:

## 2022-08-30 NOTE — FLOWSHEET NOTE
08/30/22 0448   Wound 08/17/22 Sacrum Dorsal scab/with red/purple discoloration   Date First Assessed/Time First Assessed: 08/17/22 1800   Present on Hospital Admission: Yes  Primary Wound Type: Pressure Injury  Location: Sacrum  Wound Location Orientation: Dorsal  Wound Description (Comments): scab/with red/purple discoloration   Dressing Status New dressing applied   Wound Cleansed Cleansed with saline   Dressing/Treatment Pharmaceutical agent (see MAR); Foam   Dressing changed to sacrum, and wound cleansed. Turned and repositioned.

## 2022-08-30 NOTE — FLOWSHEET NOTE
08/30/22 0915   Vital Signs   Temp 97.1 °F (36.2 °C)   Temp Source Axillary   Heart Rate 87   Heart Rate Source Monitor   Resp 18   /64   BP Location Left upper arm   BP Method Automatic   MAP (Calculated) 78.33   Pain Assessment   Pain Assessment Ohara-Pierre FACES   Pain Level 0   Ohara-Baker Pain Rating 0   Patient's Stated Pain Goal Unable to verbalize/indicate pain goal   Oxygen Therapy   SpO2 94 %   O2 Device High flow nasal cannula   Skin Assessment Clean, dry, & intact   O2 Flow Rate (L/min) 4 L/min       Pt assessment complete; see flow sheets. Vitals completed. Meds given per MAR. Son Bryce Correias with MD this morning about hospice consult. Hospice to be consulted today. Pt repostioned in bed. Pt stable.     Toro Crespo RN

## 2022-08-30 NOTE — FLOWSHEET NOTE
08/30/22 1400   Vital Signs   Heart Rate 98   Heart Rate Source Monitor   Resp 17   Pain Assessment   Ohara-Baker Pain Rating 0   Response to Pain Intervention Unable to communicate   Oxygen Therapy   SpO2 95 %   O2 Device High flow nasal cannula   O2 Flow Rate (L/min) 4 L/min     Pt going hospice. Afternoon vitals (BP and temp) not taken to allow for uninterrupted time at the bedside with family. HR and O2 obtained from heart monitor. Pt stable.     Daljit Wynne RN

## 2022-08-30 NOTE — PROGRESS NOTES
Pulmonary & Critical Care Medicine ICU Progress Note    CC: Acute respiratory failure    Events of Last 24 hours:   Nonverbal  Appears comfortable on 4 L O2      Invasive Lines: 8/17/2022 right femoral CVC in ER      MV: 8/17/2022-8/20/22    IV:   dextrose      sodium chloride       Vitals:  Blood pressure 101/67, pulse 78, temperature 97.6 °F (36.4 °C), temperature source Axillary, resp. rate 16, height 5' 6\" (1.676 m), weight 139 lb 9 oz (63.3 kg), SpO2 92 %. on 4L  Constitutional:  No acute distress. Stokes Chime HEENT: no scleral icterus  Neck: No tracheal deviation present. Cardiovascular: Normal heart sounds. Pulmonary/Chest: No wheezes. No rhonchi. No rales. No decreased breath sounds. No accessory muscle usage or stridor. Abdominal: Soft. Musculoskeletal: No cyanosis. No clubbing. Skin: Skin is warm and dry. Scheduled Meds:   guaiFENesin-dextromethorphan  5 mL Oral q8h    [Held by provider] fondaparinux  7.5 mg SubCUTAneous Daily    Venelex   Topical BID    sodium chloride flush  5-40 mL IntraVENous 2 times per day    insulin lispro  0-4 Units SubCUTAneous Q4H     PRN Meds:  potassium chloride **OR** potassium alternative oral replacement **OR** potassium chloride, magnesium sulfate, glucose, dextrose bolus **OR** dextrose bolus, glucagon (rDNA), dextrose, ipratropium-albuterol, sodium chloride flush, sodium chloride, ondansetron **OR** ondansetron, polyethylene glycol, acetaminophen **OR** acetaminophen    Results:    CBC:   Recent Labs     08/29/22  0500   WBC 8.6   HGB 7.9*   HCT 24.0*   MCV 97.2        BMP:   Recent Labs     08/28/22  0420 08/29/22  0500    143   K 3.4* 3.7    110   CO2 28 27   BUN 18 15   CREATININE <0.5* <0.5*     LIVER PROFILE:   No results for input(s): AST, ALT, LIPASE, BILIDIR, BILITOT, ALKPHOS in the last 72 hours. Invalid input(s):   AMYLASE,  ALB    Cultures:  8/17/2022 SARS-CoV-2 and influenza are negative  8/17/2022 blood sent  8/17/2022 streptococcal pneumonia antigen is positive  2022 tracheal aspirate MRSA (LEATHA 1) and streptococcal pneumonia; Imagin2022 chest CT left greater than right lower lobe predominant airspace disease      2022 CXR left-sided airspace disease      22 CXR: Complete left sided opacification      ECHO 19 with EF 60-65%, Grade I DD       ASSESSMENT:  Acute hypoxemic respiratory failure  New left lung atelectasis vs effusion - with some shift to the left may be atelectasis. Suspecting collapsed lung from mucous plug   Streptococcal pneumoniae pneumonia, respiratory culture also with MRSA  Dementia of the Alzheimer's type    PLAN:  Supplemental oxygen to maintain SaO2 >92%; wean as tolerated  Metanebs and Mucinex  Completed 7 days CTX/Vanc  Arixtra until able to give DOAC  MICHAEL pending with + HIT    Recommend removing CVC  Patient is poor candidate and risks>benefits for invasive procedures given age and advanced dementia.   Dr. Kathie Pabon discussed with family today and plan for hospice

## 2022-08-30 NOTE — CARE COORDINATION
DISCHARGE ORDER  Date/Time 2022 2:41 PM  Completed by: Elif Epstein, RN, Case Management    Patient Name: Yadira Barragan    : 1940      Admit order Date and Status: INPT   Noted discharge order. (verify MD's last order for status of admission/Traditional Medicare 3 [place] Inpatient qualifying stay required for SNF)    Confirmed discharge plan with:              Patient:  Yes              When pt confirms DC plan does any support person need to be contacted by CM Yes if yes who__pt family at bedside____                      Discharge to Facility: [address] phone number for staff giving report: [phone]   Pre-certification completed: [facility] Exemption Notification (HENS) completed: n/a   Discharge orders and Continuity of Care faxed to facility:  facility to obtain from [facility]      Transportation:               Medical Transport explained with choice list offered to pt/family. Choice:(no preference)  Agency used: [address]  time:  [id number]   Pt/family/Nursing/Facility aware of  time:   Yes Names: pt family at bedside, pt nurse, facility,   Ambulance form completed:  yes:      Date Last IMM Given: n/a    Comments:hospice    Pt is being d/c'd to [facility] today. Pt's O2 sats are 94% on 4lpm.      Discharge timeout done with Ravin Bledsoe. All discharge needs and concerns addressed. Discharging nurse to complete ANGI, reconcile AVS, and place final copy with patient's discharge packet. Discharging RN to ensure that written prescriptions for  Level II medications are sent with patient to the facility as per protocol.

## 2022-08-30 NOTE — CARE COORDINATION
CM spoke with pt son, Sarahy Dickey, via telephone. Sarahy Dickey states that he would like a referral called to Hospice for information and possible services. Sarahy Dickey states that pt will go to [facility] at discharge and states no preference for hospice, just an agency that goes to [facility]. ROEL spoke with Lucas Matias with [facility] who states that they can accept pt at discharge. Referral called to [facility] and spoke with Annalise Edmonds who states that they will review pt information and call pt son, Sarahy Dickey. Demographic, H/P and Hospice order faxed to [facility] at [phone].    1010: Rec'd call from [facility] [place] with [facility] who states that she spoke with pt son, Sarahy Dickey, via telephone and that she is meeting him at the hospital for initial consultation at approximately 1130-12N.

## 2022-08-30 NOTE — PROGRESS NOTES
Speech Language Pathology  Dysphagia Treatment/Follow-Up Note  Facility/Department: [address] ICU    Recommendations: IDDSI 4 Puree Solids; IDDSI 0 Thin Liquids- straws OK; Meds crushed in puree as able- IF FULLY ALERT- plan for palliative/hospice care today. **pt appeared to be fatiguing with PO intake (increased time to initiate swallow for all PO towards end of session); pt will likely benefit from smaller meals, more frequently. Pt also benefits from verbal and tactile cues to \"open mouth wide\" and \"take a sip:\"- palliative care meeting to take place    Risk Management: upright for all intake, stay upright for at least 30 mins after intake, small bites/sips, total feed,oral care 2-3x/day to reduce adverse affects in the event of aspiration, increase physical mobility as able, alternate bites/sips, slow rate of intake, general aspiration precautions, and hold PO and contact SLP if s/s of aspiration or worsening respiratory status develop. Recommend use of thermal-gustatory stimulation (really cold, hot, sweet, sour bolus) to promote improved communication between peripheral nervous system and central nervous system. This should help the patient recognize task and improve safety with PO intake. Suspect limited PO intake    NAME:Shilpa Reece  : 1940 (80 y.o.)   MRN: [id number]  ROOM: /0311-77  ADMISSION DATE: 2022  PATIENT DIAGNOSIS(ES): Multifocal pneumonia [J18.9]  Allergies   Allergen Reactions    Avandia [Rosiglitazone Maleate]     Codeine      Per son Codeine caused patient to go into cardiac arrest    Glucophage [Metformin Hydrochloride]      Lactic acidosis      Hydrochlorothiazide     Tramadol        DATE ONSET: 2022    Pain: The patient does not complain of pain- pt grossly non-verbal       Current Diet: Diet NPO per diet orders. Per SLP note from previous sessions, recommended initiating pureed solids, thin liquids, meds crushed in puree.  Pt has been intermittently on diet and on pleasure feeds based upon alertness level. Diet Tolerance:  Pt still currently NPO. Dysphagia Treatment and Impressions:  Subjective: Pt seen in room at bedside with RN permission. Reports concern re: alertness for PO intake  RN Report/Chart Review: Pt awake and cooperative, but does remain non-verbal. Pt upright in bed. Pt noted with audible wet breath sounds prior to any PO intake. Patient tolerance: Pt is NPO per diet orders. SLP had recommended initiating puree and thin liquids last week as long as pt was able to maintain alertness. PAWAN has been variable. Respiratory Status: Pt with SPO2% of 88-90 on 4 LPM HFNC with RR of 16    Liquid PO Trials:   IDDSI 0 Thin:  Assessed via straw: no anterior bolus loss, NO verbal cues needed for use of straw today. No need for dropper method prior to straw. Suspect improved alertness. No  bolus holding towards, suspect grossly functional A-P transit. Audible swallow; suspect delayed swallow. Pt taking consecutive sips. No overt s/s of aspiration - no coughing, throat clearing, wet vocal quality, change in vitals. Audible wet breath sounds continue    Solid PO Trials  IDDSI 4 Puree:   utilized gustatory stim via cold and sweet bolus to promote improved sensory awareness. Pt with improved ability to open oral cavity. Pt benefited from verbal; cues to \"open mouth wide\" and tactile cue of placing spoon to lips. There was no anterior bolus loss. Suspect reduced/impaired A-P bolus transit - but grossly improved today compared to prior sessions with suspected improved swallow timing. Occ bolus holding at end of PO trials - suspect pt fatiguing. Education: SLP edu pt re: Role of SLP, Rationale for dysphagia tx, Recommended compensatory strategies, Aspiration precautions, and POC. Pt no evidence of learning. RN aware of recommendations  Assessment: Pt grossly tolerating puree and thin liquids with no clinical s/s of aspiration.  Poor carryover of recommended compensatory strategies. Suspect pt is fatiguing with PO trials - time for pt to initiate swallow increased towards end of session. Suspect pt will benefit from smaller meals, more frequently. Pt also benefits from verbal and tactile cues to open oral cavity, accept PO, and occ needed to initiate a swallow   Recommend use of thermal-gustatory stimulation (really cold, hot, sweet, sour bolus) to promote improved communication between peripheral nervous system and central nervous system. This should help the patient recognize task and improve safety with PO intake. Suspect limited PO intake    Recommendations: SLP recommends IDDSI 4 Puree Solids; IDDSI 0 Thin Liquids- straws OK; Meds crushed in puree as able  *pt appeared to be fatiguing with PO intake (increased time to initiate swallow for all PO towards end of session); pt will likely benefit from smaller meals, more frequently. Pt also benefits from verbal and tactile cues to \"open mouth wide\" and \"take a sip:\"    Risk Management: upright for all intake, stay upright for at least 30 mins after intake, small bites/sips, total feed,oral care 2-3x/day to reduce adverse affects in the event of aspiration, increase physical mobility as able, alternate bites/sips, slow rate of intake, general aspiration precautions, and hold PO and contact SLP if s/s of aspiration or worsening respiratory status develop. Recommend use of thermal-gustatory stimulation (really cold, hot, sweet, sour bolus) to promote improved communication between peripheral nervous system and central nervous system. This should help the patient recognize task and improve safety with PO intake. Suspect limited PO intake    Dysphagia Goals:  Goals:  Short Term Goals:  Timeframe for Short Term Goals: (5 days 08/28/2022)  Goal 1: The patient will tolerate repeat BSE when able for ongoing assessment  8/30/2022 : Goal addressed, see above. Ongoing, progressing.     Goal 2: The patient will tolerate instrumental assessment when able   8/30/2022 : Goal addressed, no appropriate at this time. Ongoing, progressing. Goal 3: The patient will tolerate recommended diet with no clinical s/s of aspiration 5/5 8/30/2022 : Goal addressed, see above. Ongoing, progressing. Goal 4: The patient will tolerate therapeutic diet upgrade trials with no clinical s/s of aspiration 5/5 8/30/2022 : Goal addressed, see above. Ongoing, progressing. Long Term Goals:   Timeframe for Long Term Goals: (7 days 08/30/2022)  Goal 1: The patient will tolerate least restrictive diet with no clinical s/s of aspiration or worsening respiratory/pulmonary status  8/30/2022 : Ongoing, progressing. Speech/Language/Cog Goals: N/A     Recommendations:  Solid Consistency: puree  Liquid Consistency: thin liquids-straws OK  Medication: Meds crushed in puree as able    Plan:    Continued Dysphagia treatment with goals per plan of care. Discharge Recommendations: TBD    If pt discharges from hospital prior to Speech/Swallowing discharge, this note serves as tx and discharge summary.      Total Treatment Time / Charges     Time in Time out Total Time / units   Cognitive Tx         Speech Tx      Dysphagia Tx 1330 1347 17 min / 1 unit     Signature:  Selwyn Tejeda M.A., [id number]  Speech-Language Pathologist  Phone: [address]- [address], [address]  Hours: M-F [phone]

## 2022-08-31 NOTE — DISCHARGE SUMMARY
Name:  Grace Caldera  Room:  /7426-35  MRN:    [id number]    Discharge Summary      This discharge summary is in conjunction with a complete physical exam done on the day of discharge. Discharging Physician: Dr. Jaime Colunga: 2022  Discharge:  2022    HPI:  Patient is a 80 y.o.  female  With known h.o  HTN, DM, CVA, dementia living in a NH presented with increasing resp distress by EMS. Pt was noted to be septic, hypotensive, given IVF started on levophed , was placed on vent support by EMS and brought to [facility] . She developed Afibb with RVR, started on amiodarone, digoxin and admitted to ICU with vent support . Critical care consulted     No other info available . Pt seen sedated on vent with no distress      Diagnoses this Admission and Hospital Course   Pneumococcal pneumonia   - imaging with Multifocal pneumonia- cx with MRSA and also strep pneumo   - started on cefepime and vanc-cefepime switched to Rocephin . Continue vancomycin and Rocephin , day 7 of 7 . Course of antibiotics completed. - blood cx remain neg   -S/p mechanical ventilation --> off vent  - oxygenation remains challenging. Seen by pulmonary critical care  CXR -large left-sided pleural effusion. Discussed with patient's POA. Explained risk of thoracentesis including possible need for chest tube. We decided to not pursue thoracentesis. Acute hypoxemic resp failure  Ongoing need for 8l/min O2   wean O2 as tolerated. Patient is limited code     Sepsis- severe with shock  - hypotensive despite IVF boluses ; was requiring pressors Levophed and vasopressin .   - S/P stress dose steroids  - Off pressors now . Blood pressure stable     Afib with RVR   - converted to NSR with amio and digoxin  - Cardiology consulted  -TSH wnl  - [id number] Score of 4; anticoagulation indicated.   -Patient started on heparin drip, per cardiology recommendations to switch to NOAC when appropriate and set up a cardiac monitor at discharge.  -> Thrombocytopenia now, rule out HIT. Heparin drip stopped and patient was started on Arixtra  on 8/22 for anticoagulation             - HIT positive, MICHAEL still pending. Thrombocytopenia  - HIT antibody positive, MICHAEL pending -->not yet back  -Stopped heparin.   -Monitor platelet count--> remaining stable      Hypokalemia   -mild 3.1 today   -replacement protocol prn      DM 2  - holding home meds, currently on ssi     Acute renal failure - resolved  - sec to sepsis- likely ATN  - improved with IVF, dc IVF     Advanced dementia  -Limited code  But family does want reintubation if needed ; no CPR no defib      Hypokalemia   - repleted     Dysphagia  -Speech therapy following daily  Still n.p.o., with pleasure feeds  -was on TF via [address]  - now [address] removed. Start oral diet per SLP         DVT Prophylaxis: arixtra     prognosis very poor.      Procedures (Please Review Full Report for Details)  N/A    Consults    Cardiology  Pulmonology   Wound care RN      Physical Exam at Discharge:    /64   Pulse 98   Temp 97.1 °F (36.2 °C) (Axillary)   Resp 17   Ht 5' 6\" (1.676 m)   Wt 139 lb 9 oz (63.3 kg)   SpO2 95%   BMI 22.53 kg/m²     See today's progress note    CBC:   Recent Labs     08/29/22  0500   WBC 8.6   HGB 7.9*   HCT 24.0*   MCV 97.2        BMP:   Recent Labs     08/29/22  0500      K 3.7      CO2 27   BUN 15   CREATININE <0.5*     PT/INR:   Recent Labs     08/29/22  0500   PROTIME 16.4*   INR 1.34*     U/A:    Lab Results   Component Value Date/Time    NITRITE neg 06/28/2019 12:25 PM    COLORU Yellow 08/17/2022 12:30 PM    WBCUA 6-9 08/17/2022 12:30 PM    RBCUA 5-10 08/17/2022 12:30 PM    MUCUS 1+ 08/17/2022 12:30 PM    BACTERIA 1+ 08/17/2022 12:30 PM    CLARITYU Clear 08/17/2022 12:30 PM    SPECGRAV >=1.030 08/17/2022 12:30 PM    LEUKOCYTESUR Negative 08/17/2022 12:30 PM    BLOODU MODERATE 08/17/2022 12:30 PM    GLUCOSEU 100 08/17/2022 12:30 PM    AMORPHOUS 2+ 06/02/2014 06:42 PM       ABG    Lab Results   Component Value Date/Time    FBG1WOJ 28.2 08/20/2022 05:55 AM    BEART 4.0 08/20/2022 05:55 AM    X2RTDCUZ 96.4 08/20/2022 05:55 AM    PHART 7.459 08/20/2022 05:55 AM    PXY4AMT 40.6 08/20/2022 05:55 AM    PO2ART 80.2 08/20/2022 05:55 AM    SLA2LJU 29.4 08/20/2022 05:55 AM         CULTURES  Blood; NGTD  Sputum: MRSA  Strep pneumoniae: positive  Legionella: negative  COVID/flu: negative    RADIOLOGY  XR CHEST 1 VIEW   Final Result   1. Unchanged complete opacification of the left lung. XR CHEST 1 VIEW   Final Result   New large left pleural effusion with complete opacification of the left lung. XR CHEST PORTABLE   Final Result   Patchy infiltrates seen within the lungs bilaterally which may represent   multifocal pneumonia. XR CHEST PORTABLE   Final Result   Slightly improving aeration of the lungs in this intubated patient. XR CHEST PORTABLE   Final Result   No significant interval change in moderate left-sided airspace disease as   compared to prior. Trace bilateral pleural effusions or pleural thickening. CT ABDOMEN PELVIS W IV CONTRAST Additional Contrast? None   Final Result   1. No pulmonary embolism. 2. Multifocal airspace opacities most prominent in the left lower lobe. Pattern likely represents developing pneumonia. ARDS may be considered. 3. Distended gallbladder with no pattern of inflammation or biliary   dilatation. This may be due to fasting status or other systemic processes   described above. 4. Multiple cystic lesions within the pancreas that have developed or are   increased from a prior study in 2016. Recommend follow-up imaging in 2 years   to ensure stability. 5. Advanced atherosclerotic changes of the aorta in the lower abdomen with   stenosis noted without occlusion. CT CHEST PULMONARY EMBOLISM W CONTRAST   Final Result   1. No pulmonary embolism.    2. Multifocal airspace opacities most prominent in the left lower lobe. Pattern likely represents developing pneumonia. ARDS may be considered. 3. Distended gallbladder with no pattern of inflammation or biliary   dilatation. This may be due to fasting status or other systemic processes   described above. 4. Multiple cystic lesions within the pancreas that have developed or are   increased from a prior study in 2016. Recommend follow-up imaging in 2 years   to ensure stability. 5. Advanced atherosclerotic changes of the aorta in the lower abdomen with   stenosis noted without occlusion. CT CERVICAL SPINE WO CONTRAST   Final Result   No acute abnormality of the cervical spine. CT HEAD WO CONTRAST   Final Result   No acute intracranial abnormality. XR CHEST PORTABLE   Final Result   1. Left-sided pneumonia. Recommend imaging follow-up to resolution. 2. Devices in place as above. Discharge Medications     Medication List        CONTINUE taking these medications      MIRALAX PO     montelukast 10 MG tablet  Commonly known as: SINGULAIR  TAKE ONE TABLET BY MOUTH DAILY     traZODone 100 MG tablet  Commonly known as: DESYREL  Take 1 tablet by mouth nightly            STOP taking these medications      EXCEDRIN MIGRAINE PO     SITagliptin 100 MG tablet  Commonly known as: Lorraine Hoffman                Discharged in stable condition to [facility]. Follow Up: Follow up with PCP. Total time spent on discharge is 35 minutes    ALEJANDRA Sam.